# Patient Record
Sex: FEMALE | Race: WHITE | Employment: UNEMPLOYED | URBAN - METROPOLITAN AREA
[De-identification: names, ages, dates, MRNs, and addresses within clinical notes are randomized per-mention and may not be internally consistent; named-entity substitution may affect disease eponyms.]

---

## 2017-01-27 ENCOUNTER — OFFICE VISIT (OUTPATIENT)
Dept: PAIN MANAGEMENT | Age: 42
End: 2017-01-27

## 2017-01-27 VITALS — DIASTOLIC BLOOD PRESSURE: 82 MMHG | HEART RATE: 110 BPM | SYSTOLIC BLOOD PRESSURE: 120 MMHG | HEIGHT: 61 IN

## 2017-01-27 DIAGNOSIS — G89.4 CHRONIC PAIN SYNDROME: Primary | ICD-10-CM

## 2017-01-27 DIAGNOSIS — M79.7 FIBROMYALGIA: ICD-10-CM

## 2017-01-27 DIAGNOSIS — N30.10 INTERSTITIAL CYSTITIS: ICD-10-CM

## 2017-01-27 DIAGNOSIS — M25.50 PAIN IN JOINT, MULTIPLE SITES: ICD-10-CM

## 2017-01-27 DIAGNOSIS — G47.01 INSOMNIA SECONDARY TO CHRONIC PAIN: ICD-10-CM

## 2017-01-27 DIAGNOSIS — F51.04 CHRONIC INSOMNIA: ICD-10-CM

## 2017-01-27 DIAGNOSIS — Z79.899 ENCOUNTER FOR LONG-TERM (CURRENT) USE OF HIGH-RISK MEDICATION: ICD-10-CM

## 2017-01-27 DIAGNOSIS — M79.2 NEUROPATHIC PAIN: ICD-10-CM

## 2017-01-27 DIAGNOSIS — G89.29 INSOMNIA SECONDARY TO CHRONIC PAIN: ICD-10-CM

## 2017-01-27 RX ORDER — GABAPENTIN 600 MG/1
600 TABLET ORAL 3 TIMES DAILY
Qty: 90 TAB | Refills: 2 | Status: SHIPPED | OUTPATIENT
Start: 2017-01-27 | End: 2017-10-12 | Stop reason: SDUPTHER

## 2017-01-27 RX ORDER — HYDROMORPHONE HYDROCHLORIDE 8 MG/1
8 TABLET ORAL
Qty: 120 TAB | Refills: 0 | Status: SHIPPED | OUTPATIENT
Start: 2017-01-27 | End: 2017-08-22 | Stop reason: SDUPTHER

## 2017-01-27 RX ORDER — DULOXETIN HYDROCHLORIDE 60 MG/1
60 CAPSULE, DELAYED RELEASE ORAL DAILY
Qty: 30 CAP | Refills: 5 | Status: SHIPPED | OUTPATIENT
Start: 2017-01-27 | End: 2017-06-23 | Stop reason: SDUPTHER

## 2017-01-27 RX ORDER — FENTANYL 75 UG/H
PATCH TRANSDERMAL
Qty: 10 PATCH | Refills: 0 | Status: SHIPPED | OUTPATIENT
Start: 2017-01-27 | End: 2017-06-17

## 2017-01-27 RX ORDER — FENTANYL 75 UG/H
PATCH TRANSDERMAL
Qty: 10 PATCH | Refills: 0 | Status: SHIPPED | OUTPATIENT
Start: 2017-01-27 | End: 2017-04-25 | Stop reason: SDUPTHER

## 2017-01-27 RX ORDER — HYDROMORPHONE HYDROCHLORIDE 8 MG/1
8 TABLET ORAL
Qty: 120 TAB | Refills: 0 | Status: SHIPPED | OUTPATIENT
Start: 2017-01-27 | End: 2017-02-26

## 2017-01-27 RX ORDER — BACLOFEN 10 MG/1
TABLET ORAL
Qty: 90 TAB | Refills: 3 | Status: SHIPPED | OUTPATIENT
Start: 2017-01-27 | End: 2017-04-25 | Stop reason: SDUPTHER

## 2017-01-27 RX ORDER — GABAPENTIN 300 MG/1
600 CAPSULE ORAL 2 TIMES DAILY
Qty: 120 CAP | Refills: 1 | Status: CANCELLED | OUTPATIENT
Start: 2017-01-27

## 2017-01-27 NOTE — MR AVS SNAPSHOT
Visit Information Date & Time Provider Department Dept. Phone Encounter #  
 1/27/2017 10:20 AM Samy Bernal, [address] [facility] [id number] Follow-up Instructions Return in about 3 months (around 4/27/2017). Upcoming Health Maintenance Date Due Pneumococcal 19-64 Medium Risk (1 of 1 - PPSV23) 2/16/1996 DTaP/Tdap/Td series (1 - Tdap) 2/16/1998 PAP AKA CERVICAL CYTOLOGY 2/16/1998 INFLUENZA AGE 9 TO ADULT 8/1/2016 Allergies as of 1/27/2017  Review Complete On: 1/27/2017 By: SAMMI Beard Severity Noted Reaction Type Reactions Augmentin [Amoxicillin-pot Clavulanate] High 05/19/2013    Anaphylaxis Amoxicillin  04/04/2013   Topical Hives, Itching Itching of throat Ciprofloxacin  04/04/2013   Topical Other (comments) Redness of arm above IV site Darvocet A500 [Propoxyphene N-acetaminophen]  04/04/2013   Topical Hives Macrobid [Nitrofurantoin Monohyd/m-cryst]  07/23/2013   Side Effect Hives Nubain [Nalbuphine]  04/04/2013   Topical Hives, Other (comments) Muscle spasms Phenergan [Promethazine]  11/09/2016    Other (comments) Makes me feel like Im on fire from the inside out and causes involuntary muscle spasms. Toradol [Ketorolac Tromethamine]  01/11/2016    Itching Vibramycin [Doxycycline Calcium]  04/04/2013   Topical Hives, Itching Itching of the throat Current Immunizations  Never Reviewed No immunizations on file. Not reviewed this visit You Were Diagnosed With   
  
 Codes Comments Chronic pain syndrome    -  Primary ICD-10-CM: G89.4 ICD-9-CM: 338.4 Encounter for long-term (current) use of high-risk medication     ICD-10-CM: Z79.899 ICD-9-CM: V58.69 Interstitial cystitis     ICD-10-CM: N30.10 ICD-9-CM: 595.1  Insomnia secondary to chronic pain     ICD-10-CM: G89.29, G47.01 
ICD-9-CM: 338.29, 327.01   
 Fibromyalgia     ICD-10-CM: M79.7 ICD-9-CM: 729.1 Pain in joint, multiple sites     ICD-10-CM: M25.50 ICD-9-CM: 719.49 Neuropathic pain     ICD-10-CM: M79.2 ICD-9-CM: 729.2 Chronic insomnia     ICD-10-CM: F51.04 
ICD-9-CM: 780.52 Vitals BP Pulse Height(growth percentile) OB Status Smoking Status 120/82 (!) 110 5' 1\" (1.549 m) Hysterectomy Current Every Day Smoker Preferred Pharmacy Pharmacy Name Phone [address] [facility]. [address]  [phone] Your Updated Medication List  
  
   
This list is accurate as of: 1/27/17 11:24 AM.  Always use your most recent med list.  
  
  
  
  
 * baclofen 10 mg tablet Commonly known as:  LIORESAL Take 1/2 to 1 tab po up to three times per day for muscle spasms * baclofen 10 mg tablet Commonly known as:  LIORESAL Take 1/2 to 1 tab po up to three times per day for muscle spasms DULoxetine 60 mg capsule Commonly known as:  CYMBALTA Take 1 Cap by mouth daily for 30 days. Indications: FIBROMYALGIA  
  
 EPINEPHrine 0.3 mg/0.3 mL (1:1,000) injection Commonly known as:  AUVI-Q  
0.3 mL by IntraMUSCular route once as needed for Other (severe allergic reaction ) for 1 dose. * fentaNYL 75 mcg/hr Commonly known as:  [place] Gustavo Apply 1 patch to clean, dry skin q 72 hrs for chronic pain. Remove, fold over, & discard old patch. Do not use heat sources directly over the patch. (due to fill 02/03/17)  Indications: CHRONIC PAIN WITH OPIOID TOLERANCE, SEVERE PAIN WITH OPIOID TOLERANCE  
  
 * fentaNYL 75 mcg/hr Commonly known as:  [place] Gustavo Apply 1 patch to clean, dry skin q 72 hrs for chronic pain. Remove, fold over, & discard old patch. Do not use heat sources directly over the patch. (due to fill 03/04/17)  Indications: CHRONIC PAIN WITH OPIOID TOLERANCE, SEVERE PAIN WITH OPIOID TOLERANCE  
  
 * fentaNYL 75 mcg/hr Commonly known as:  [place] Gustavo  
 Apply 1 patch to clean, dry skin q 72 hrs for chronic pain. Remove, fold over, & discard old patch. Do not use heat sources directly over the patch. Due to fill 4/1/17  Indications: CHRONIC PAIN WITH OPIOID TOLERANCE, SEVERE PAIN WITH OPIOID TOLERANCE  
  
 * gabapentin 300 mg capsule Commonly known as:  NEURONTIN Take 2 Caps by mouth two (2) times a day. Take one tab po qhs for two weeks then increase to 600 mg at bedtime as tolerated * gabapentin 600 mg tablet Commonly known as:  NEURONTIN Take 1 Tab by mouth three (3) times daily. For neuropathic pain  
  
 * HYDROmorphone 8 mg tablet Commonly known as:  DILAUDID Take 1 Tab by mouth four (4) times daily as needed for Pain for up to 30 days. For breakthrough pain. Due to fill 02/03/17  Indications: PAIN  
  
 * HYDROmorphone 8 mg tablet Commonly known as:  DILAUDID Take 1 Tab by mouth four (4) times daily as needed for Pain for up to 30 days. For breakthrough pain. Due to fill 03/04/17  Indications: PAIN  
  
 * HYDROmorphone 8 mg tablet Commonly known as:  DILAUDID Take 1 Tab by mouth four (4) times daily as needed for Pain for up to 30 days. For breakthrough pain. Due to fill 4/1/17  Indications: PAIN  
  
 LORazepam 0.5 mg tablet Commonly known as:  ATIVAN Take  by mouth. ondansetron 4 mg disintegrating tablet Commonly known as:  ZOFRAN ODT Take 1 Tab by mouth every eight (8) hours as needed for Nausea. * Notice: This list has 10 medication(s) that are the same as other medications prescribed for you. Read the directions carefully, and ask your doctor or other care provider to review them with you. Prescriptions Printed Refills  
 fentaNYL (DURAGESIC) 75 mcg/hr 0 Sig: Apply 1 patch to clean, dry skin q 72 hrs for chronic pain. Remove, fold over, & discard old patch. Do not use heat sources directly over the patch. (due to fill 02/03/17)  Indications: CHRONIC PAIN WITH OPIOID TOLERANCE, SEVERE PAIN WITH OPIOID TOLERANCE Class: Print HYDROmorphone (DILAUDID) 8 mg tablet 0 Sig: Take 1 Tab by mouth four (4) times daily as needed for Pain for up to 30 days. For breakthrough pain. Due to fill 02/03/17  Indications: PAIN Class: Print Route: Oral  
 fentaNYL (DURAGESIC) 75 mcg/hr 0 Sig: Apply 1 patch to clean, dry skin q 72 hrs for chronic pain. Remove, fold over, & discard old patch. Do not use heat sources directly over the patch. (due to fill 03/04/17)  Indications: CHRONIC PAIN WITH OPIOID TOLERANCE, SEVERE PAIN WITH OPIOID TOLERANCE Class: Print HYDROmorphone (DILAUDID) 8 mg tablet 0 Sig: Take 1 Tab by mouth four (4) times daily as needed for Pain for up to 30 days. For breakthrough pain. Due to fill 03/04/17  Indications: PAIN Class: Print Route: Oral  
 HYDROmorphone (DILAUDID) 8 mg tablet 0 Sig: Take 1 Tab by mouth four (4) times daily as needed for Pain for up to 30 days. For breakthrough pain. Due to fill 4/1/17  Indications: PAIN Class: Print Route: Oral  
 fentaNYL (DURAGESIC) 75 mcg/hr 0 Sig: Apply 1 patch to clean, dry skin q 72 hrs for chronic pain. Remove, fold over, & discard old patch. Do not use heat sources directly over the patch. Due to fill 4/1/17  Indications: CHRONIC PAIN WITH OPIOID TOLERANCE, SEVERE PAIN WITH OPIOID TOLERANCE Class: Print Prescriptions Sent to Pharmacy Refills  
 baclofen (LIORESAL) 10 mg tablet 3 Sig: Take 1/2 to 1 tab po up to three times per day for muscle spasms Class: Normal  
 [facility] [address] Ph #: [phone] DULoxetine (CYMBALTA) 60 mg capsule 5 Sig: Take 1 Cap by mouth daily for 30 days. Indications: FIBROMYALGIA Class: Normal  
 [facility] [address] [address] Ph #: [phone]  Route: Oral  
 gabapentin (NEURONTIN) 600 mg tablet 2  
 Sig: Take 1 Tab by mouth three (3) times daily. For neuropathic pain  
 Class: Normal  
 Pharmacy: [address] [place], [place] - [address] [facility] Ph #: [phone] Route: Oral  
  
Follow-up Instructions Return in about 3 months (around 4/27/2017). Patient Instructions 1. Continue medications as prescribed 2. Increase neurontin to 600 mg three times per day 3. Follow up in three months with dr. Yohan Russell 4. Keep pcp apt./consider GI/sleep study Introducing [facility]! Dear Baljit Oglesby: Thank you for requesting a I AM AT account. Our records indicate that you already have an active I AM AT account. You can access your account anytime at [email or web address] Did you know that you can access your hospital and ER discharge instructions at any time in I AM AT? You can also review all of your test results from your hospital stay or ER visit. Additional Information If you have questions, please visit the Frequently Asked Questions section of the I AM AT website at [email or web address] Remember, I AM AT is NOT to be used for urgent needs. For medical emergencies, dial 911. Now available from your iPhone and Android! Please provide this summary of care documentation to your next provider. Your primary care clinician is listed as [facility]. If you have any questions after today's visit, please call [phone].

## 2017-01-27 NOTE — PATIENT INSTRUCTIONS
1.  Continue medications as prescribed  2. Increase neurontin to 600 mg three times per day  3. Follow up in three months with dr. Kamari Hayes  4.   Keep pcp apt./consider GI/sleep study

## 2017-01-27 NOTE — PROGRESS NOTES
HISTORY OF PRESENT ILLNESS  Danuta Stuart is a 46 y.o. female. HPI  The patient presents today for follow up of chronic severe pain which is widespread and multifactorial and includes fibromyalgia as well as generalized osteoarthritis and interstitial cystitis. Complex patient. She has a pcp but no appointment scheduled. She does reports episodes of twitching at night when she goes to sleep (advised can be result of opioid). The patient denies any other significant changes since last f/u. She tolerates medications without side effects. Danuta Stuart reports no change in sleep or constipation. She is not taking a sleep aid (otc prn). No sleep study yet, waiting for insurance. She takes a stool softener for constipation. She has constipation and loose stools. Recommend GI. The patient reports 40-50% relief with current medications. She has a 15year old son. She reports her headaches improved. She describes the bladder pain as constant,  \"like my insides are caving in\", stabbing, and burning. Sitting and physical activity aggravate her pain. Laying down, medication and heat alleviate her pain. She is spending most of her time in bed. She continues to have generalized body pain that is constant and achy. She is not under any psychiatric therapy. She denies suicidal/homicidal ideation. The patient reports functional improvement and QOL with pain medication. The neurontin is helping. It does not last 12 hours. No side effects. No concern for misuse abuse or diversion. UDS 10/13/16 reviewed and appears consistent    Review of Systems   Constitutional: Positive for malaise/fatigue. Gastrointestinal: Positive for abdominal pain (cramping) and constipation. Genitourinary: Positive for dysuria. H/o IC   Musculoskeletal: Positive for back pain, joint pain, myalgias and neck pain. Neurological: Positive for weakness (generalized). Psychiatric/Behavioral: The patient has insomnia. All other systems reviewed and are negative. Physical Exam   Constitutional: She is oriented to person, place, and time. She appears well-developed and well-nourished. No distress. HENT:   Head: Normocephalic. Eyes: EOM are normal. Pupils are equal, round, and reactive to light. Neck:   Tender throughout/painful ROM/tight musculature   Pulmonary/Chest: Effort normal.   Abdominal: There is tenderness (exquisite) in the suprapubic area. Musculoskeletal: She exhibits tenderness (throughout back/tight musculature). She exhibits no edema. Right hip: She exhibits tenderness (over TB). Left hip: She exhibits tenderness (over TB). Right knee: Tenderness (medial aspect) found. Left knee: Tenderness (medial aspect) found. Cervical back: She exhibits tenderness, pain and spasm. She exhibits normal range of motion. Thoracic back: She exhibits tenderness (to light and deep palpation) and pain. She exhibits normal range of motion. Lumbar back: She exhibits tenderness (to light and deep palpation/generalized/+SIJ tenderness bilaterally) and pain. She exhibits normal range of motion (painful) and no spasm. Back:    Neurological: She is alert and oriented to person, place, and time. No cranial nerve deficit. Gait (antalgic) abnormal.   Psychiatric: Her behavior is normal. Judgment and thought content normal. Her mood appears anxious. She exhibits a depressed mood. Nursing note and vitals reviewed. ASSESSMENT and PLAN  Encounter Diagnoses   Name Primary?     Chronic pain syndrome Yes    Encounter for long-term (current) use of high-risk medication     Interstitial cystitis     Insomnia secondary to chronic pain     Fibromyalgia     Pain in joint, multiple sites     Neuropathic pain     Chronic insomnia      Orders Placed This Encounter    baclofen (LIORESAL) 10 mg tablet    DULoxetine (CYMBALTA) 60 mg capsule    fentaNYL (DURAGESIC) 75 mcg/hr    HYDROmorphone (DILAUDID) 8 mg tablet    fentaNYL (DURAGESIC) 75 mcg/hr    HYDROmorphone (DILAUDID) 8 mg tablet    gabapentin (NEURONTIN) 600 mg tablet    HYDROmorphone (DILAUDID) 8 mg tablet    fentaNYL (DURAGESIC) 75 mcg/hr     Patient Instructions   1. Continue medications as prescribed  2. Increase neurontin to 600 mg three times per day  3. Follow up in three months with dr. Jm Arias  4. Keep pcp apt./consider GI/sleep study    40 minutes spent in visit face to face with the patient.     >50% of time spent counseling and coordinating care

## 2017-01-27 NOTE — PROGRESS NOTES
Nursing Notes    Patient presents to the office today in follow-up. Patient rates her pain at 6/10 on the numerical pain scale. Reviewed medications with counts as follows:    Rx Date filled Qty Dispensed Pill Count Last Dose Short   Fentanyl 75 mcg/hr  01/05/17 10 2, + one on Current patch on right hip no   Dilaudid 8 mg 01/05/17 120 36 today no                            POC UDS was not performed in office today. Any new labs or imaging since last appointment? NO    Have you been to an emergency room (ER) or urgent care clinic since your last visit? NO            Have you been hospitalized since your last visit? NO     If yes, where, when, and reason for visit? Have you seen or consulted any other health care providers outside of the [address] [place] [address]  since your last visit? NO     If yes, where, when, and reason for visit? HM deferred to pcp.

## 2017-03-06 NOTE — TELEPHONE ENCOUNTER
Received call from pt relaying that Narcan will need to be prescribed at her next fill due to new regulations. Have sent script for [facility] to [facility].

## 2017-04-25 ENCOUNTER — OFFICE VISIT (OUTPATIENT)
Dept: PAIN MANAGEMENT | Age: 42
End: 2017-04-25

## 2017-04-25 VITALS — BODY MASS INDEX: 24.56 KG/M2 | WEIGHT: 130 LBS

## 2017-04-25 DIAGNOSIS — N30.10 INTERSTITIAL CYSTITIS: Primary | ICD-10-CM

## 2017-04-25 DIAGNOSIS — G89.29 INSOMNIA SECONDARY TO CHRONIC PAIN: ICD-10-CM

## 2017-04-25 DIAGNOSIS — G47.01 INSOMNIA SECONDARY TO CHRONIC PAIN: ICD-10-CM

## 2017-04-25 DIAGNOSIS — M79.7 FIBROMYALGIA: ICD-10-CM

## 2017-04-25 DIAGNOSIS — M25.50 PAIN IN JOINT, MULTIPLE SITES: ICD-10-CM

## 2017-04-25 DIAGNOSIS — Z79.899 ENCOUNTER FOR LONG-TERM (CURRENT) USE OF HIGH-RISK MEDICATION: ICD-10-CM

## 2017-04-25 DIAGNOSIS — G89.4 CHRONIC PAIN SYNDROME: ICD-10-CM

## 2017-04-25 DIAGNOSIS — M79.2 NEUROPATHIC PAIN: ICD-10-CM

## 2017-04-25 LAB
ALCOHOL UR POC: NORMAL
AMPHETAMINES UR POC: NEGATIVE
BARBITURATES UR POC: NEGATIVE
BENZODIAZEPINES UR POC: NEGATIVE
BUPRENORPHINE UR POC: NORMAL
CANNABINOIDS UR POC: NEGATIVE
CARISOPRODOL UR POC: NORMAL
COCAINE UR POC: NEGATIVE
FENTANYL UR POC: NORMAL
MDMA/ECSTASY UR POC: NEGATIVE
METHADONE UR POC: NEGATIVE
METHAMPHETAMINE UR POC: NEGATIVE
METHYLPHENIDATE UR POC: NEGATIVE
OPIATES UR POC: NORMAL
OXYCODONE UR POC: NEGATIVE
PHENCYCLIDINE UR POC: NEGATIVE
PROPOXYPHENE UR POC: NORMAL
TRAMADOL UR POC: NORMAL
TRICYCLICS UR POC: NEGATIVE

## 2017-04-25 RX ORDER — MORPHINE SULFATE 15 MG/1
15 TABLET ORAL
Qty: 120 TAB | Refills: 0 | Status: SHIPPED | OUTPATIENT
Start: 2017-05-23 | End: 2017-06-23 | Stop reason: SDUPTHER

## 2017-04-25 RX ORDER — MORPHINE SULFATE 15 MG/1
15 TABLET ORAL
Qty: 120 TAB | Refills: 0 | Status: SHIPPED | OUTPATIENT
Start: 2017-04-25 | End: 2017-06-23 | Stop reason: SDUPTHER

## 2017-04-25 RX ORDER — METHADONE HYDROCHLORIDE 10 MG/1
10 TABLET ORAL 3 TIMES DAILY
Qty: 90 TAB | Refills: 0 | Status: SHIPPED | OUTPATIENT
Start: 2017-05-23 | End: 2017-06-23 | Stop reason: SDUPTHER

## 2017-04-25 RX ORDER — METHADONE HYDROCHLORIDE 10 MG/1
10 TABLET ORAL 3 TIMES DAILY
Qty: 90 TAB | Refills: 0 | Status: SHIPPED | OUTPATIENT
Start: 2017-04-25 | End: 2017-06-23 | Stop reason: SDUPTHER

## 2017-04-25 RX ORDER — GABAPENTIN 800 MG/1
800 TABLET ORAL 3 TIMES DAILY
Qty: 90 TAB | Refills: 5 | Status: SHIPPED | OUTPATIENT
Start: 2017-04-25 | End: 2017-05-25

## 2017-04-25 NOTE — PROGRESS NOTES
HISTORY OF PRESENT ILLNESS  Nigel Snow is a 38 y.o. female. HPI Returns for followup of chronic, severe pain which is widespread and multifactorial and includes fibromyalgia as well as generalized osteoarthritis and interstitial cystitis. Since last seen, she has suffered a significant decline in her functionality and pain control. Her medication history was reviewed. She has been on fentanyl and hydromorphone for a considerable period of time. Alternatives were reviewed and it was elected to discontinue both fentanyl and hydromorphone and initiate methadone, 10 mg, 3 times daily for long-acting pain control and MSIR, 15 mg, 4 times daily as needed for breakthrough pain. Gabapentin will also be increased to 800 mg 3 times daily for better neuropathic pain control. Pain level today 7 out of 10, outcome 16/28,(The lower the upper number, the better the outcome)  Physical activity and mobility, mood, and sleep are all poor. No reported side effects. A current review of the  does not identify any inconsistency. UDS obtained and reviewed; formal confirmation from laboratory is pending. Review of Systems   Constitutional: Positive for malaise/fatigue. Gastrointestinal: Positive for abdominal pain (cramping) and constipation. Genitourinary: Positive for dysuria. H/o IC   Musculoskeletal: Positive for back pain, joint pain, myalgias and neck pain. Neurological: Positive for weakness (generalized). Psychiatric/Behavioral: The patient has insomnia. All other systems reviewed and are negative. Physical Exam   Constitutional: She is oriented to person, place, and time. She appears well-developed and well-nourished. No distress. HENT:   Head: Normocephalic. Eyes: EOM are normal. Pupils are equal, round, and reactive to light. Neck: No thyromegaly present.    Tender throughout/painful ROM/tight musculature   Pulmonary/Chest: Effort normal.   Abdominal: There is tenderness (exquisite) in the suprapubic area. Musculoskeletal: She exhibits tenderness (throughout back/tight musculature). She exhibits no edema. Right hip: She exhibits tenderness (over TB). Left hip: She exhibits tenderness (over TB). Right knee: Tenderness (medial aspect) found. Left knee: Tenderness (medial aspect) found. Cervical back: She exhibits pain. She exhibits normal range of motion and no spasm. Tenderness: tight musculature/to light and deep palpation. Thoracic back: She exhibits tenderness (to light and deep palpation) and pain. She exhibits normal range of motion. Lumbar back: She exhibits tenderness (to light and deep palpation/generalized/+SIJ tenderness bilaterally) and pain. She exhibits normal range of motion (painful) and no spasm. Neurological: She is alert and oriented to person, place, and time. No cranial nerve deficit. Gait (antalgic) abnormal.   Psychiatric: Her behavior is normal. Judgment and thought content normal. Her mood appears anxious. She exhibits a depressed mood. Nursing note and vitals reviewed. ASSESSMENT and PLAN  Encounter Diagnoses   Name Primary?  Interstitial cystitis Yes    Encounter for long-term (current) use of high-risk medication     Neuropathic pain     Insomnia secondary to chronic pain     Chronic pain syndrome     Fibromyalgia     Pain in joint, multiple sites      Treatment plan as noted above. She has already received a prescription for injectable naloxone to use in the event of an overdose. 2 month reassess    No concerns are raised for misuse, abuse, or diversion. 1. Pain medications are prescribed with the objective of pain relief and improved physical and psychosocial function in this patient. 2. Counseled patient on proper use of prescribed medications and reviewed opioid contract.   3. Counseled patient about chronic medical conditions and their relationship to anxiety and depression and recommended mental health support as needed. 4. Reviewed with patient self-help tools, home exercise, and lifestyle changes to assist the patient in self-management of symptoms. 5. Advised patient to have a primary care provider to continue care for health maintenance and general medical conditions and support for referral to specialty care as needed. 6. Reviewed with patient the treatment plan, goals of treatment plan, and limitations of treatment plan, to include the potential for side effects from medications and procedures. If side effects occur, it is the responsibility of the patient to inform the clinic so that a change in the treatment plan can be made in a safe manner. The patient is advised that stopping prescribed medication may cause an increase in symptoms and possible medication withdrawal symptoms. The patient is informed an emergency room evaluation may be necessary if this occurs. DISPOSITION: The patients condition and plan were discussed at length and all questions were answered. The patient agrees with the plan.     Counseling occupied > 50% of visit:  Total time: 40 minutes

## 2017-04-25 NOTE — PATIENT INSTRUCTIONS
You are in possession of medication that is no longer part of your active treatment regimen. We strongly advise that you properly dispose of it as quickly as possible to help reduce the chance that others may accidentally take or intentionally misuse the discontinued medication. Please understand that ongoing use or distribution of a discontinued medication containing a controlled substance is a violation of your signed Controlled Substance Consent & Treatment Agreement and will result in dismissal from our practice. Listed below are some options and special instructions to consider when disposing of discontinued, , unwanted, or unused medications:    · [employer] ([employer]) authorized collectors cansafely and securely collect and dispose of pharmaceuticals containing controlled substances and other medications. Authorized collection sites may be [employer], hospital or clinic pharmacies, and law enforcement locations. For JIHAN-authorized collectors near you, contact the [place] of [address] [place] [address] [facility] at [phone] or visit the following web address: [employer] [place]. [email or web address]?[email or web address]. JIHAN-authorized collectors within the local [address] [place] [address] Jason include:  [address] [address], [address]    [address]  [address]Eduin Heredia, [facility]  [address] [place] [address], [facility] [address], [address]    [address]  [facility] [address], [place] [address], [address]    · Medicine take-back programs are another good way to safely dispose of most types of discontinued medications. The JIHAN periodically hosts National Prescription Drug Take-Back events where collection sites are set up in communities nationwide for safe disposal of prescription drugs. Local law enforcement agencies may also sponsor medication take-back programs. · If unable to reach a medication take-back program or JIHAN-authorized , you can also follow these simple steps to dispose of medications in the household trash:  1. Mix medications (do not crush tablets or capsules) with an unappetizing substance such as dirt, george litter, or used coffee grounds;  2. Place the mixture in a container such as a sealed plastic bag;  3. Throw the container in your household trash;  4. Scratch out all personal information on the prescription label of your empty pill bottle or empty medication packaging to make it unreadable, then dispose of the container. Current health maintenance issues were reviewed and the patient was advised to followup with his/her PCP for completion of these items.

## 2017-04-25 NOTE — MR AVS SNAPSHOT
Visit Information Date & Time Provider Department Dept. Phone Encounter #  
 4/25/2017  9:20 AM Alexis Doan MD [facility] 21  Follow-up Instructions Return in about 2 months (around 6/25/2017). Upcoming Health Maintenance Date Due Pneumococcal 19-64 Medium Risk (1 of 1 - PPSV23) 2/16/1996 DTaP/Tdap/Td series (1 - Tdap) 2/16/1998 PAP AKA CERVICAL CYTOLOGY 2/16/1998 INFLUENZA AGE 9 TO ADULT 8/1/2016 Allergies as of 4/25/2017  Review Complete On: 4/25/2017 By: Alexis Doan MD  
  
 Severity Noted Reaction Type Reactions Augmentin [Amoxicillin-pot Clavulanate] High 05/19/2013    Anaphylaxis Amoxicillin  04/04/2013   Topical Hives, Itching Itching of throat Ciprofloxacin  04/04/2013   Topical Other (comments) Redness of arm above IV site Darvocet A500 [Propoxyphene N-acetaminophen]  04/04/2013   Topical Hives Macrobid [Nitrofurantoin Monohyd/m-cryst]  07/23/2013   Side Effect Hives Nubain [Nalbuphine]  04/04/2013   Topical Hives, Other (comments) Muscle spasms Phenergan [Promethazine]  11/09/2016    Other (comments) Makes me feel like Im on fire from the inside out and causes involuntary muscle spasms. Toradol [Ketorolac Tromethamine]  01/11/2016    Itching Vibramycin [Doxycycline Calcium]  04/04/2013   Topical Hives, Itching Itching of the throat Current Immunizations  Never Reviewed No immunizations on file. Not reviewed this visit You Were Diagnosed With   
  
 Codes Comments Interstitial cystitis    -  Primary ICD-10-CM: N30.10 ICD-9-CM: 595.1 Encounter for long-term (current) use of high-risk medication     ICD-10-CM: Z79.899 ICD-9-CM: V58.69 Neuropathic pain     ICD-10-CM: M79.2 ICD-9-CM: 729.2  Insomnia secondary to chronic pain     ICD-10-CM: G89.29, G47.01 
ICD-9-CM: 338.29, 327.01   
 Chronic pain syndrome     ICD-10-CM: G89.4 ICD-9-CM: 338.4 Fibromyalgia     ICD-10-CM: M79.7 ICD-9-CM: 729.1 Pain in joint, multiple sites     ICD-10-CM: M25.50 ICD-9-CM: 719.49 Vitals Weight(growth percentile) BMI OB Status Smoking Status 130 lb (59 kg) 24.56 kg/m2 Hysterectomy Current Every Day Smoker BMI and BSA Data Body Mass Index Body Surface Area 24.56 kg/m 2 1.59 m 2 Preferred Pharmacy Pharmacy Name Phone [address] [facility]. [address] [place] [address] [phone] Your Updated Medication List  
  
   
This list is accurate as of: 4/25/17 10:19 AM.  Always use your most recent med list.  
  
  
  
  
 baclofen 10 mg tablet Commonly known as:  LIORESAL Take 1/2 to 1 tab po up to three times per day for muscle spasms EPINEPHrine 0.3 mg/0.3 mL (1:1,000) injection Commonly known as:  AUVI-Q  
0.3 mL by IntraMUSCular route once as needed for Other (severe allergic reaction ) for 1 dose. fentaNYL 75 mcg/hr Commonly known as:  [place] Apply 1 patch to clean, dry skin q 72 hrs for chronic pain. Remove, fold over, & discard old patch. Do not use heat sources directly over the patch. (due to fill 02/03/17)  Indications: CHRONIC PAIN WITH OPIOID TOLERANCE, SEVERE PAIN WITH OPIOID TOLERANCE  
  
 * gabapentin 300 mg capsule Commonly known as:  NEURONTIN Take 2 Caps by mouth two (2) times a day. Take one tab po qhs for two weeks then increase to 600 mg at bedtime as tolerated * gabapentin 600 mg tablet Commonly known as:  NEURONTIN Take 1 Tab by mouth three (3) times daily. For neuropathic pain * gabapentin 800 mg tablet Commonly known as:  NEURONTIN Take 1 Tab by mouth three (3) times daily for 30 days. Indications: NEUROPATHIC PAIN  
  
 LORazepam 0.5 mg tablet Commonly known as:  ATIVAN Take  by mouth. * methadone 10 mg tablet Commonly known as:  DOLOPHINE  
 Take 1 Tab by mouth three (3) times daily for 30 days. Max Daily Amount: 30 mg. Indications: Chronic Pain, Severe Pain * methadone 10 mg tablet Commonly known as:  DOLOPHINE Take 1 Tab by mouth three (3) times daily for 30 days. Max Daily Amount: 30 mg. Indications: Chronic Pain, Severe Pain Start taking on:  5/23/2017 * morphine IR 15 mg tablet Commonly known as:  MS IR Take 1 Tab by mouth four (4) times daily as needed for Pain for up to 30 days. Max Daily Amount: 60 mg. Indications: Pain, Severe Pain * morphine IR 15 mg tablet Commonly known as:  MS IR Take 1 Tab by mouth four (4) times daily as needed for Pain for up to 30 days. Max Daily Amount: 60 mg. Indications: Pain, Severe Pain Start taking on:  5/23/2017 NARCAN IJ  
by Injection route. ondansetron 4 mg disintegrating tablet Commonly known as:  ZOFRAN ODT Take 1 Tab by mouth every eight (8) hours as needed for Nausea. * Notice: This list has 7 medication(s) that are the same as other medications prescribed for you. Read the directions carefully, and ask your doctor or other care provider to review them with you. Prescriptions Printed Refills  
 methadone (DOLOPHINE) 10 mg tablet 0 Starting on: 5/23/2017 Sig: Take 1 Tab by mouth three (3) times daily for 30 days. Max Daily Amount: 30 mg. Indications: Chronic Pain, Severe Pain Class: Print Route: Oral  
 methadone (DOLOPHINE) 10 mg tablet 0 Sig: Take 1 Tab by mouth three (3) times daily for 30 days. Max Daily Amount: 30 mg. Indications: Chronic Pain, Severe Pain Class: Print Route: Oral  
 morphine IR (MS IR) 15 mg tablet 0 Starting on: 5/23/2017 Sig: Take 1 Tab by mouth four (4) times daily as needed for Pain for up to 30 days. Max Daily Amount: 60 mg. Indications: Pain, Severe Pain Class: Print  Route: Oral  
 morphine IR (MS IR) 15 mg tablet 0  
 Sig: Take 1 Tab by mouth four (4) times daily as needed for Pain for up to 30 days. Max Daily Amount: 60 mg. Indications: Pain, Severe Pain Class: Print Route: Oral  
  
Prescriptions Sent to Pharmacy Refills  
 gabapentin (NEURONTIN) 800 mg tablet 5 Sig: Take 1 Tab by mouth three (3) times daily for 30 days. Indications: NEUROPATHIC PAIN Class: Normal  
 Pharmacy: [facility] [address]  Ph #: [phone] Route: Oral  
  
We Performed the Following AMB POC DRUG SCREEN () [ [facility]] DRUG SCREEN [[id number] Custom] Follow-up Instructions Return in about 2 months (around 2017). Patient Instructions You are in possession of medication that is no longer part of your active treatment regimen. We strongly advise that you properly dispose of it as quickly as possible to help reduce the chance that others may accidentally take or intentionally misuse the discontinued medication. Please understand that ongoing use or distribution of a discontinued medication containing a controlled substance is a violation of your signed Controlled Substance Consent & Treatment Agreement and will result in dismissal from our practice. Listed below are some options and special instructions to consider when disposing of discontinued, , unwanted, or unused medications: · [employer] ([employer]) authorized collectors cansafely and securely collect and dispose of pharmaceuticals containing controlled substances and other medications. Authorized collection sites may be [employer], hospital or clinic pharmacies, and law enforcement locations. For JIHAN-authorized collectors near you, contact the [place] of [address] [place] [address] at [phone] or visit the following web address: [employer]win. [employer]gov/pubdispsearch/spring/main?execution=e1s1. JIHAN-authorized collectors within the local [address] [place] [address] include: 
[address] [address] 
[place], [address] [facility] 
KIKI/Eduin Tolliver Giovanna, [facility] [address] [facility] [address] [place], [address] 
[address], [facility] 100 [facility], [address] · Medicine take-back programs are another good way to safely dispose of most types of discontinued medications. The JIHAN periodically hosts National Prescription Drug Take-Back events where collection sites are set up in communities nationwide for safe disposal of prescription drugs. Local law enforcement agencies may also sponsor medication take-back programs. · If unable to reach a medication take-back program or JIHAN-authorized , you can also follow these simple steps to dispose of medications in the household trash: 
1. Mix medications (do not crush tablets or capsules) with an unappetizing substance such as dirt, george litter, or used coffee grounds; 2. Place the mixture in a container such as a sealed plastic bag; 
3. Throw the container in your household trash; 
4. Scratch out all personal information on the prescription label of your empty pill bottle or empty medication packaging to make it unreadable, then dispose of the container. Current health maintenance issues were reviewed and the patient was advised to followup with his/her PCP for completion of these items. Introducing [facility] HE[facility]! Dear Andrew Razo: Thank you for requesting a [employer] account. Our records indicate that you already have an active [employer] account. You can access your account anytime at [email or web address] Did you know that you can access your hospital and ER discharge instructions at any time in [employer]?   You can also review all of your test results from your hospital stay or ER visit. Additional Information If you have questions, please visit the Frequently Asked Questions section of the [employer] website at [email or web address] Remember, [employer] is NOT to be used for urgent needs. For medical emergencies, dial 911. Now available from your iPhone and Android! Please provide this summary of care documentation to your next provider. Your primary care clinician is listed as [address] [place] [address]. If you have any questions after today's visit, please call [phone].

## 2017-04-25 NOTE — PROGRESS NOTES
Nursing Notes    Patient presents to the office today in follow-up. Patient rates her pain at 7/10 on the numerical pain scale. Reviewed medications with counts as follows:    Rx Date filled Qty Dispensed Pill Count Last Dose Short   Fentanyl 75 4/1/17 10 2+1 on Placed 4/23/17 no   Dilaudid 8 4/1/17 120 30 4/25/17 no         Comments:     POC UDS was performed in office today    Any new labs or imaging since last appointment? NO    Have you been to an emergency room (ER) or urgent care clinic since your last visit? NO            Have you been hospitalized since your last visit? NO     If yes, where, when, and reason for visit? Have you seen or consulted any other health care providers outside of the [address] [place] [address]  since your last visit? NO     If yes, where, when, and reason for visit? Ms. Vicky Park has a reminder for a \"due or due soon\" health maintenance. I have asked that she contact her primary care provider for follow-up on this health maintenance.

## 2017-04-25 NOTE — PROGRESS NOTES
Ms. Mike Dawson was advised to discontinue fentanyl and dilaudid. She was provided education on proper medication disposal options as indicated by the FDA/JIHAN. She was also advised that failing to properly dispose of medications that are no longer part of her regimen would be considered non-compliance with the treatment plan.

## 2017-06-17 ENCOUNTER — APPOINTMENT (OUTPATIENT)
Dept: GENERAL RADIOLOGY | Age: 42
End: 2017-06-17
Attending: INTERNAL MEDICINE
Payer: MEDICAID

## 2017-06-17 ENCOUNTER — HOSPITAL ENCOUNTER (EMERGENCY)
Age: 42
Discharge: HOME | End: 2017-06-17
Attending: INTERNAL MEDICINE
Payer: MEDICAID

## 2017-06-17 VITALS
TEMPERATURE: 99.4 F | DIASTOLIC BLOOD PRESSURE: 79 MMHG | HEIGHT: 61 IN | SYSTOLIC BLOOD PRESSURE: 108 MMHG | OXYGEN SATURATION: 98 % | BODY MASS INDEX: 25.49 KG/M2 | WEIGHT: 135 LBS | RESPIRATION RATE: 16 BRPM | HEART RATE: 103 BPM

## 2017-06-17 DIAGNOSIS — N39.0 URINARY TRACT INFECTION WITHOUT HEMATURIA, SITE UNSPECIFIED: Primary | ICD-10-CM

## 2017-06-17 DIAGNOSIS — R11.2 NON-INTRACTABLE VOMITING WITH NAUSEA, UNSPECIFIED VOMITING TYPE: ICD-10-CM

## 2017-06-17 LAB
AMPHET UR QL SCN: NEGATIVE
ANION GAP BLD CALC-SCNC: 16 MMOL/L (ref 3–18)
APPEARANCE UR: CLEAR
BACTERIA URNS QL MICRO: ABNORMAL /HPF
BARBITURATES UR QL SCN: NEGATIVE
BASOPHILS # BLD AUTO: 0.1 K/UL (ref 0–0.06)
BASOPHILS # BLD: 1 % (ref 0–2)
BENZODIAZ UR QL: NEGATIVE
BILIRUB UR QL: NEGATIVE
BUN SERPL-MCNC: 15 MG/DL (ref 7–18)
BUN/CREAT SERPL: 17 (ref 12–20)
CALCIUM SERPL-MCNC: 9.8 MG/DL (ref 8.5–10.1)
CANNABINOIDS UR QL SCN: NEGATIVE
CHLORIDE SERPL-SCNC: 100 MMOL/L (ref 100–108)
CO2 SERPL-SCNC: 24 MMOL/L (ref 21–32)
COCAINE UR QL SCN: NEGATIVE
COLOR UR: YELLOW
CREAT SERPL-MCNC: 0.88 MG/DL (ref 0.6–1.3)
DIFFERENTIAL METHOD BLD: ABNORMAL
EOSINOPHIL # BLD: 0 K/UL (ref 0–0.4)
EOSINOPHIL NFR BLD: 0 % (ref 0–5)
EPITH CASTS URNS QL MICRO: ABNORMAL /LPF (ref 0–5)
ERYTHROCYTE [DISTWIDTH] IN BLOOD BY AUTOMATED COUNT: 14.3 % (ref 11.6–14.5)
GLUCOSE SERPL-MCNC: 98 MG/DL (ref 74–99)
GLUCOSE UR STRIP.AUTO-MCNC: NEGATIVE MG/DL
HCG UR QL: NEGATIVE
HCT VFR BLD AUTO: 44.8 % (ref 35–45)
HDSCOM,HDSCOM: ABNORMAL
HGB BLD-MCNC: 14.9 G/DL (ref 12–16)
HGB UR QL STRIP: NEGATIVE
KETONES UR QL STRIP.AUTO: 15 MG/DL
LEUKOCYTE ESTERASE UR QL STRIP.AUTO: ABNORMAL
LIPASE SERPL-CCNC: 107 U/L (ref 73–393)
LYMPHOCYTES # BLD AUTO: 25 % (ref 21–52)
LYMPHOCYTES # BLD: 2.9 K/UL (ref 0.9–3.6)
MCH RBC QN AUTO: 28.6 PG (ref 24–34)
MCHC RBC AUTO-ENTMCNC: 33.3 G/DL (ref 31–37)
MCV RBC AUTO: 86 FL (ref 74–97)
METHADONE UR QL: POSITIVE
MONOCYTES # BLD: 0.5 K/UL (ref 0.05–1.2)
MONOCYTES NFR BLD AUTO: 5 % (ref 3–10)
MUCOUS THREADS URNS QL MICRO: ABNORMAL /LPF
NEUTS SEG # BLD: 8 K/UL (ref 1.8–8)
NEUTS SEG NFR BLD AUTO: 69 % (ref 40–73)
NITRITE UR QL STRIP.AUTO: NEGATIVE
OPIATES UR QL: POSITIVE
PCP UR QL: NEGATIVE
PH UR STRIP: 5 [PH] (ref 5–8)
PLATELET # BLD AUTO: 444 K/UL (ref 135–420)
PMV BLD AUTO: 10.2 FL (ref 9.2–11.8)
POTASSIUM SERPL-SCNC: 4.1 MMOL/L (ref 3.5–5.5)
PROT UR STRIP-MCNC: ABNORMAL MG/DL
RBC # BLD AUTO: 5.21 M/UL (ref 4.2–5.3)
RBC #/AREA URNS HPF: ABNORMAL /HPF (ref 0–5)
SODIUM SERPL-SCNC: 140 MMOL/L (ref 136–145)
SP GR UR REFRACTOMETRY: >1.03 (ref 1–1.03)
UROBILINOGEN UR QL STRIP.AUTO: 1 EU/DL (ref 0.2–1)
WBC # BLD AUTO: 11.6 K/UL (ref 4.6–13.2)
WBC URNS QL MICRO: ABNORMAL /HPF (ref 0–5)

## 2017-06-17 PROCEDURE — 96375 TX/PRO/DX INJ NEW DRUG ADDON: CPT

## 2017-06-17 PROCEDURE — 96361 HYDRATE IV INFUSION ADD-ON: CPT

## 2017-06-17 PROCEDURE — 81001 URINALYSIS AUTO W/SCOPE: CPT | Performed by: INTERNAL MEDICINE

## 2017-06-17 PROCEDURE — 96374 THER/PROPH/DIAG INJ IV PUSH: CPT

## 2017-06-17 PROCEDURE — 85025 COMPLETE CBC W/AUTO DIFF WBC: CPT | Performed by: INTERNAL MEDICINE

## 2017-06-17 PROCEDURE — 74011250636 HC RX REV CODE- 250/636: Performed by: INTERNAL MEDICINE

## 2017-06-17 PROCEDURE — 99285 EMERGENCY DEPT VISIT HI MDM: CPT

## 2017-06-17 PROCEDURE — 87086 URINE CULTURE/COLONY COUNT: CPT | Performed by: INTERNAL MEDICINE

## 2017-06-17 PROCEDURE — 83690 ASSAY OF LIPASE: CPT | Performed by: INTERNAL MEDICINE

## 2017-06-17 PROCEDURE — 93005 ELECTROCARDIOGRAM TRACING: CPT

## 2017-06-17 PROCEDURE — 74022 RADEX COMPL AQT ABD SERIES: CPT

## 2017-06-17 PROCEDURE — 80307 DRUG TEST PRSMV CHEM ANLYZR: CPT | Performed by: INTERNAL MEDICINE

## 2017-06-17 PROCEDURE — 87186 SC STD MICRODIL/AGAR DIL: CPT | Performed by: INTERNAL MEDICINE

## 2017-06-17 PROCEDURE — 81025 URINE PREGNANCY TEST: CPT

## 2017-06-17 PROCEDURE — 80048 BASIC METABOLIC PNL TOTAL CA: CPT | Performed by: INTERNAL MEDICINE

## 2017-06-17 PROCEDURE — 87077 CULTURE AEROBIC IDENTIFY: CPT | Performed by: INTERNAL MEDICINE

## 2017-06-17 RX ORDER — ONDANSETRON 2 MG/ML
4 INJECTION INTRAMUSCULAR; INTRAVENOUS
Status: COMPLETED | OUTPATIENT
Start: 2017-06-17 | End: 2017-06-17

## 2017-06-17 RX ORDER — MORPHINE SULFATE 2 MG/ML
2 INJECTION, SOLUTION INTRAMUSCULAR; INTRAVENOUS
Status: COMPLETED | OUTPATIENT
Start: 2017-06-17 | End: 2017-06-17

## 2017-06-17 RX ORDER — SULFAMETHOXAZOLE AND TRIMETHOPRIM 800; 160 MG/1; MG/1
1 TABLET ORAL 2 TIMES DAILY
Qty: 14 TAB | Refills: 0 | Status: SHIPPED | OUTPATIENT
Start: 2017-06-17 | End: 2017-06-27

## 2017-06-17 RX ORDER — CELECOXIB 50 MG/1
50 CAPSULE ORAL 2 TIMES DAILY
COMMUNITY
End: 2018-07-03

## 2017-06-17 RX ORDER — ONDANSETRON 4 MG/1
TABLET, ORALLY DISINTEGRATING ORAL
Qty: 10 TAB | Refills: 0 | Status: SHIPPED | OUTPATIENT
Start: 2017-06-17 | End: 2017-10-12 | Stop reason: SDUPTHER

## 2017-06-17 RX ADMIN — SODIUM CHLORIDE 1000 ML: 900 INJECTION, SOLUTION INTRAVENOUS at 12:46

## 2017-06-17 RX ADMIN — Medication 2 MG: at 12:49

## 2017-06-17 RX ADMIN — ONDANSETRON 4 MG: 2 INJECTION INTRAMUSCULAR; INTRAVENOUS at 12:47

## 2017-06-17 NOTE — ED NOTES
Pt resting in stretcher in NAD at this time. Mother to bedside. Pt remains on CCM. Call light within reach. No needs verbalized at this time.

## 2017-06-17 NOTE — DISCHARGE INSTRUCTIONS
Urinary Tract Infection in Women: Care Instructions  Your Care Instructions    A urinary tract infection, or UTI, is a general term for an infection anywhere between the kidneys and the urethra (where urine comes out). Most UTIs are bladder infections. They often cause pain or burning when you urinate. UTIs are caused by bacteria and can be cured with antibiotics. Be sure to complete your treatment so that the infection goes away. Follow-up care is a key part of your treatment and safety. Be sure to make and go to all appointments, and call your doctor if you are having problems. It's also a good idea to know your test results and keep a list of the medicines you take. How can you care for yourself at home? · Take your antibiotics as directed. Do not stop taking them just because you feel better. You need to take the full course of antibiotics. · Drink extra water and other fluids for the next day or two. This may help wash out the bacteria that are causing the infection. (If you have kidney, heart, or liver disease and have to limit fluids, talk with your doctor before you increase your fluid intake.)  · Avoid drinks that are carbonated or have caffeine. They can irritate the bladder. · Urinate often. Try to empty your bladder each time. · To relieve pain, take a hot bath or lay a heating pad set on low over your lower belly or genital area. Never go to sleep with a heating pad in place. To prevent UTIs  · Drink plenty of water each day. This helps you urinate often, which clears bacteria from your system. (If you have kidney, heart, or liver disease and have to limit fluids, talk with your doctor before you increase your fluid intake.)  · Urinate when you need to. · Urinate right after you have sex. · Change sanitary pads often. · Avoid douches, bubble baths, feminine hygiene sprays, and other feminine hygiene products that have deodorants.   · After going to the bathroom, wipe from front to back.  When should you call for help? Call your doctor now or seek immediate medical care if:  · Symptoms such as fever, chills, nausea, or vomiting get worse or appear for the first time. · You have new pain in your back just below your rib cage. This is called flank pain. · There is new blood or pus in your urine. · You have any problems with your antibiotic medicine. Watch closely for changes in your health, and be sure to contact your doctor if:  · You are not getting better after taking an antibiotic for 2 days. · Your symptoms go away but then come back. Where can you learn more? Go to http://santos-tuan.info/. Enter A013 in the search box to learn more about \"Urinary Tract Infection in Women: Care Instructions. \"  Current as of: November 28, 2016  Content Version: 11.2  © 2476-0940 "[employer]", [employer]. Care instructions adapted under license by [employer] (which disclaims liability or warranty for this information). If you have questions about a medical condition or this instruction, always ask your healthcare professional. [address] any warranty or liability for your use of this information.

## 2017-06-17 NOTE — ED TRIAGE NOTES
Chest pain since Tuesday, nausea, vomiting, diarrhea since Wednesday. Sepsis Screening completed    (  )Patient meets SIRS criteria. ( x )Patient does not meet SIRS criteria.       SIRS Criteria is achieved when two or more of the following are present   Temperature < 96.8°F (36°C) or > 100.9°F (38.3°C)   Heart Rate > 90 beats per minute   Respiratory Rate > 20 breaths per minute   WBC count > 12,000 or <4,000 or > 10% bands

## 2017-06-17 NOTE — ED PROVIDER NOTES
[address]  EMERGENCY DEPARTMENT HISTORY AND PHYSICAL EXAM       Date: 2017   Patient Name: Viktoriya Sales   YOB: 1975  Medical Record Number: [id number]    History of Presenting Illness     Chief Complaint   Patient presents with    Vomiting    Diarrhea    Chest Pain        History Provided By:  patient    Additional History: 12:13 PM   Viktoriya Sales is a 38 y.o. female h/o fibromyalgia, chronic pain, who presents to the emergency department C/O vomiting daily for last 10 days when ever pt eats or drinks anything. Associated sx's include orange urine, green diarrhea x3 daily, nausea, congestion, body aches and post nasal drainage. Pt reports she is unable to keep any medication down. Pt reports due to her vomiting her fibromyalgia and interstitial cystitis is acting up. PSHx include hysterectomy. Pt smokes . 5 pack of cigarettes a day. Pt normally takes methadone and morphine for pain. Pt denies use of illicit drugs.      Primary Care Provider: Barbara Lora MD   Specialist:    Past History     Past Medical History:   Past Medical History:   Diagnosis Date    Anxiety     Arthritis     osteopenia    Chronic kidney disease     kidney stones    Chronic pain     bladder    Depression     Fibromyalgia     Interstitial cystitis     Nausea & vomiting     Other ill-defined conditions     chronic interstitual cyctitis    Other ill-defined conditions     endometriosis    Psychiatric disorder         Past Surgical History:   Past Surgical History:   Procedure Laterality Date    HX APPENDECTOMY      HX  SECTION      HX HEENT      oral surgery wisdom teeth extractiion    HX HYSTERECTOMY      LAVH    HX PELVIC LAPAROSCOPY      x 5    HX UROLOGICAL      cysto and hydrodistention x 4    HX UROLOGICAL      Interstim implant    HX UROLOGICAL  2013    revision interstim        Family History:   Family History   Problem Relation Age of Onset  Post-op Nausea/Vomiting Sister     Malignant Hyperthermia Neg Hx     Pseudocholinesterase Deficiency Neg Hx     Delayed Awakening Neg Hx     Emergence Delirium Neg Hx     Post-op Cognitive Dysfunction Neg Hx         Social History:   Social History   Substance Use Topics    Smoking status: Current Every Day Smoker     Packs/day: 0.50     Years: 10.00    Smokeless tobacco: Never Used    Alcohol use Yes      Comment: 1 every 2 months        Allergies: Allergies   Allergen Reactions    Augmentin [Amoxicillin-Pot Clavulanate] Anaphylaxis    Amoxicillin Hives and Itching     Itching of throat    Ciprofloxacin Other (comments)     Redness of arm above IV site    Darvocet A500 [Propoxyphene N-Acetaminophen] Hives    Macrobid [Nitrofurantoin Monohyd/M-Cryst] Hives    Nubain [Nalbuphine] Hives and Other (comments)     Muscle spasms    Phenergan [Promethazine] Other (comments)     Makes me feel like Im on fire from the inside out and causes involuntary muscle spasms.  Toradol [Ketorolac Tromethamine] Itching    Vibramycin [Doxycycline Calcium] Hives and Itching     Itching of the throat        Review of Systems   Review of Systems   HENT: Positive for congestion and postnasal drip. Gastrointestinal: Positive for diarrhea, nausea and vomiting. Musculoskeletal: Positive for myalgias. All other systems reviewed and are negative. Physical Exam  Vitals:    06/17/17 1141 06/17/17 1145 06/17/17 1246   BP: 118/87 116/87 100/64   Pulse: (!) 109 (!) 114 (!) 110   Resp: 18 14 15   Temp: 99.4 °F (37.4 °C)     SpO2: 97% 97% 95%   Weight: 61.2 kg (135 lb)     Height: 5' 1\" (1.549 m)         Physical Exam   Constitutional: She is oriented to person, place, and time. She appears well-developed and well-nourished. HENT:   Head: Normocephalic and atraumatic.    Right Ear: External ear normal.   Left Ear: External ear normal.   Nose: Nose normal.   Mouth/Throat: Oropharynx is clear and moist. Mucous membranes are dry. Mild erythema and slight drainage from middle turbinates    Eyes: Conjunctivae and EOM are normal. Pupils are equal, round, and reactive to light. Right eye exhibits no discharge. Left eye exhibits no discharge. No scleral icterus. Neck: Normal range of motion. Neck supple. No JVD present. No tracheal deviation present. Cardiovascular: Regular rhythm, normal heart sounds and intact distal pulses. Tachycardia present. Exam reveals no gallop and no friction rub. No murmur heard. Pulmonary/Chest: Effort normal and breath sounds normal.   Abdominal: Soft. Bowel sounds are normal. She exhibits no distension. There is no hepatosplenomegaly. There is tenderness in the suprapubic area. There is guarding. There is no rebound. No HSM   Musculoskeletal: Normal range of motion. She exhibits no edema. Good pedal pulses   Neurological: She is alert and oriented to person, place, and time. She has normal reflexes. No cranial nerve deficit. She exhibits normal muscle tone. Coordination normal.   No focal motor weakness. Skin: Skin is warm and dry. No rash noted. Multiple surgical healed scars on abd. Lower back tattoo no bleed or infection. Left lower arm tattoo. Left abd and right hip tattoo. Healed scar left knee. Telangiectasia on upper chest.     Psychiatric: Her behavior is normal. Her mood appears anxious. No lethal ideation or plan. No hallucinations. Nursing note and vitals reviewed.     Diagnostic Study Results     Labs -      Recent Results (from the past 12 hour(s))   URINALYSIS W/ RFLX MICROSCOPIC    Collection Time: 06/17/17 12:00 PM   Result Value Ref Range    Color YELLOW      Appearance CLEAR      Specific gravity >1.030 (H) 1.005 - 1.030    pH (UA) 5.0 5.0 - 8.0      Protein TRACE (A) NEG mg/dL    Glucose NEGATIVE  NEG mg/dL    Ketone 15 (A) NEG mg/dL    Bilirubin NEGATIVE  NEG      Blood NEGATIVE  NEG      Urobilinogen 1.0 0.2 - 1.0 EU/dL    Nitrites NEGATIVE  NEG Leukocyte Esterase SMALL (A) NEG     URINE MICROSCOPIC ONLY    Collection Time: 06/17/17 12:00 PM   Result Value Ref Range    WBC 4 to 10 0 - 5 /hpf    RBC NONE 0 - 5 /hpf    Epithelial cells 2+ 0 - 5 /lpf    Bacteria 1+ (A) NEG /hpf    Mucus 1+ (A) NEG /lpf   DRUG SCREEN, URINE    Collection Time: 06/17/17 12:00 PM   Result Value Ref Range    BENZODIAZEPINE NEGATIVE  NEG      BARBITURATES NEGATIVE  NEG      THC (TH-CANNABINOL) NEGATIVE  NEG      OPIATES POSITIVE (A) NEG      PCP(PHENCYCLIDINE) NEGATIVE  NEG      COCAINE NEGATIVE  NEG      AMPHETAMINE NEGATIVE  NEG      METHADONE POSITIVE (A) NEG      HDSCOM (NOTE)    CBC WITH AUTOMATED DIFF    Collection Time: 06/17/17 12:33 PM   Result Value Ref Range    WBC 11.6 4.6 - 13.2 K/uL    RBC 5.21 4.20 - 5.30 M/uL    HGB 14.9 12.0 - 16.0 g/dL    HCT 44.8 35.0 - 45.0 %    MCV 86.0 74.0 - 97.0 FL    MCH 28.6 24.0 - 34.0 PG    MCHC 33.3 31.0 - 37.0 g/dL    RDW 14.3 11.6 - 14.5 %    PLATELET 229 (H) 626 - 420 K/uL    MPV 10.2 9.2 - 11.8 FL    NEUTROPHILS 69 40 - 73 %    LYMPHOCYTES 25 21 - 52 %    MONOCYTES 5 3 - 10 %    EOSINOPHILS 0 0 - 5 %    BASOPHILS 1 0 - 2 %    ABS. NEUTROPHILS 8.0 1.8 - 8.0 K/UL    ABS. LYMPHOCYTES 2.9 0.9 - 3.6 K/UL    ABS. MONOCYTES 0.5 0.05 - 1.2 K/UL    ABS. EOSINOPHILS 0.0 0.0 - 0.4 K/UL    ABS.  BASOPHILS 0.1 (H) 0.0 - 0.06 K/UL    DF AUTOMATED     METABOLIC PANEL, BASIC    Collection Time: 06/17/17 12:33 PM   Result Value Ref Range    Sodium 140 136 - 145 mmol/L    Potassium 4.1 3.5 - 5.5 mmol/L    Chloride 100 100 - 108 mmol/L    CO2 24 21 - 32 mmol/L    Anion gap 16 3.0 - 18 mmol/L    Glucose 98 74 - 99 mg/dL    BUN 15 7.0 - 18 MG/DL    Creatinine 0.88 0.6 - 1.3 MG/DL    BUN/Creatinine ratio 17 12 - 20      GFR est AA >60 >60 ml/min/1.73m2    GFR est non-AA >60 >60 ml/min/1.73m2    Calcium 9.8 8.5 - 10.1 MG/DL   LIPASE    Collection Time: 06/17/17 12:33 PM   Result Value Ref Range    Lipase 107 73 - 393 U/L   HCG URINE, QL. - POC Collection Time: 06/17/17 12:35 PM   Result Value Ref Range    Pregnancy test,urine (POC) NEGATIVE  NEG     EKG, 12 LEAD, INITIAL    Collection Time: 06/17/17 12:42 PM   Result Value Ref Range    Ventricular Rate 112 BPM    Atrial Rate 112 BPM    P-R Interval 100 ms    QRS Duration 66 ms    Q-T Interval 342 ms    QTC Calculation (Bezet) 466 ms    Calculated P Axis 30 degrees    Calculated R Axis 23 degrees    Calculated T Axis -153 degrees    Diagnosis       Sinus tachycardia with short WI  Minimal voltage criteria for LVH, may be normal variant  Cannot rule out Anterior infarct , age undetermined  T wave abnormality, consider inferolateral ischemia  Abnormal ECG  When compared with ECG of 19-AUG-2015 20:31,  WI interval has decreased  T wave inversion less evident in Inferior leads  T wave inversion more evident in Lateral leads         Radiologic Studies -  The following have been ordered and reviewed:  XR ABD ACUTE W 1 V CHEST   Final Result   No acute findings in the chest abdomen or pelvis. As read by the radiologist.     2:57 PM  RADIOLOGY FINDINGS  abd X-ray shows NAP  Pending review by Radiologist  Recorded by Denzel Velasquez ED Scribe, as dictated by Charleen Henderson MD     Medical Decision Making   I am the first provider for this patient. I reviewed the vital signs, available nursing notes, past medical history, past surgical history, family history and social history. Ddx: UTI, gastroenteritis, gastritis, fibromyalgia. Rule out colitis. Pt does not appear septic. Vital Signs-Reviewed the patient's vital signs. Patient Vitals for the past 12 hrs:   Temp Pulse Resp BP SpO2   06/17/17 1246 - (!) 110 15 100/64 95 %   06/17/17 1145 - (!) 114 14 116/87 97 %   06/17/17 1141 99.4 °F (37.4 °C) (!) 109 18 118/87 97 %       Pulse Oximetry Analysis - Normal 97% on RA     Cardiac Monitor:   Rate: 112 bpm  Rhythm: Sinus Tachycardia     EKG interpretation: (Preliminary)  Rhythm: sinus tach with short WI. Rate (approx.): 112 bpm; no stemi. Minimal voltage criteria. T wave abnormality   EKG read by Charleen Henderson MD at 12:42    Procedures:   Procedures    ED Course:  12:13 PM  Initial assessment performed. The patients presenting problems have been discussed, and they are in agreement with the care plan formulated and outlined with them. I have encouraged them to ask questions as they arise throughout their visit. 3:08 PM Pt is requesting valium after having morphine. I explained I cannot add valium IV or second morphine dose. I offered Toradol but she declined as has allergy per her account. Given pain at this point it is mostly chronic will let her take her pain meds which takes methadone and morphine at home. . She has a mild UTI. X-ray and blood work are normal.     Medications Given in the ED:    Medications   sodium chloride 0.9 % bolus infusion 1,000 mL (1,000 mL IntraVENous New Bag 6/17/17 1246)   ondansetron (ZOFRAN) injection 4 mg (4 mg IntraVENous Given 6/17/17 1247)   morphine injection 2 mg (2 mg IntraVENous Given 6/17/17 1249)       Discharge Note:  2:59 PM  Pt has been reexamined. Patient has no new complaints, changes, or physical findings. Care plan outlined and precautions discussed. Results were reviewed with the patient. All medications were reviewed with the patient; will d/c home. All of pt's questions and concerns were addressed. Patient was instructed and agrees to follow up with PCP, as well as to return to the ED upon further deterioration. Patient is ready to go home. Diagnosis   Clinical Impression:   1. Urinary tract infection without hematuria, site unspecified    2.  Non-intractable vomiting with nausea, unspecified vomiting type       Discussion:    Follow-up Information     Follow up With Details Comments Contact Info    Po Ramos MD Schedule an appointment as soon as possible for a visit in 2 days  [address]  [address]  [phone] [address]  As needed, If symptoms worsen [address] [place]  [phone]          Current Discharge Medication List      START taking these medications    Details   ondansetron (ZOFRAN ODT) 4 mg disintegrating tablet Take 1-2 tablets every 6-8 hours as needed for nausea and vomiting. Qty: 10 Tab, Refills: 0      trimethoprim-sulfamethoxazole (BACTRIM DS) 160-800 mg per tablet Take 1 Tab by mouth two (2) times a day for 10 days. Qty: 14 Tab, Refills: 0             _______________________________   Attestations: This note is prepared by Afshin Dyer , acting as a Scribe for Bill Walton MD on 12:13 PM on 6/17/2017 . Bill Walton MD: The scribe's documentation has been prepared under my direction and personally reviewed by me in its entirety.   _______________________________

## 2017-06-19 LAB
ATRIAL RATE: 112 BPM
CALCULATED P AXIS, ECG09: 30 DEGREES
CALCULATED R AXIS, ECG10: 23 DEGREES
CALCULATED T AXIS, ECG11: -153 DEGREES
DIAGNOSIS, 93000: NORMAL
P-R INTERVAL, ECG05: 100 MS
Q-T INTERVAL, ECG07: 342 MS
QRS DURATION, ECG06: 66 MS
QTC CALCULATION (BEZET), ECG08: 466 MS
VENTRICULAR RATE, ECG03: 112 BPM

## 2017-06-20 LAB
BACTERIA SPEC CULT: ABNORMAL
SERVICE CMNT-IMP: ABNORMAL

## 2017-06-23 ENCOUNTER — OFFICE VISIT (OUTPATIENT)
Dept: PAIN MANAGEMENT | Age: 42
End: 2017-06-23

## 2017-06-23 VITALS
DIASTOLIC BLOOD PRESSURE: 94 MMHG | HEART RATE: 123 BPM | WEIGHT: 128 LBS | BODY MASS INDEX: 24.19 KG/M2 | SYSTOLIC BLOOD PRESSURE: 120 MMHG

## 2017-06-23 DIAGNOSIS — G89.29 INSOMNIA SECONDARY TO CHRONIC PAIN: ICD-10-CM

## 2017-06-23 DIAGNOSIS — M25.50 PAIN IN JOINT, MULTIPLE SITES: ICD-10-CM

## 2017-06-23 DIAGNOSIS — M79.2 NEUROPATHIC PAIN: ICD-10-CM

## 2017-06-23 DIAGNOSIS — G89.4 CHRONIC PAIN SYNDROME: ICD-10-CM

## 2017-06-23 DIAGNOSIS — G47.01 INSOMNIA SECONDARY TO CHRONIC PAIN: ICD-10-CM

## 2017-06-23 DIAGNOSIS — Z79.899 ENCOUNTER FOR LONG-TERM (CURRENT) USE OF HIGH-RISK MEDICATION: ICD-10-CM

## 2017-06-23 DIAGNOSIS — N30.10 INTERSTITIAL CYSTITIS: Primary | ICD-10-CM

## 2017-06-23 RX ORDER — DULOXETIN HYDROCHLORIDE 60 MG/1
60 CAPSULE, DELAYED RELEASE ORAL DAILY
Qty: 30 CAP | Refills: 5 | Status: SHIPPED | OUTPATIENT
Start: 2017-06-23 | End: 2017-07-23

## 2017-06-23 RX ORDER — MORPHINE SULFATE 15 MG/1
15 TABLET ORAL
Qty: 120 TAB | Refills: 0 | Status: SHIPPED | OUTPATIENT
Start: 2017-07-21 | End: 2017-08-20

## 2017-06-23 RX ORDER — METHADONE HYDROCHLORIDE 10 MG/1
15 TABLET ORAL 3 TIMES DAILY
Qty: 135 TAB | Refills: 0 | Status: SHIPPED | OUTPATIENT
Start: 2017-07-21 | End: 2017-08-22 | Stop reason: SDUPTHER

## 2017-06-23 RX ORDER — BACLOFEN 20 MG/1
20 TABLET ORAL 3 TIMES DAILY
Qty: 90 TAB | Refills: 5 | Status: SHIPPED | OUTPATIENT
Start: 2017-06-23 | End: 2017-07-23

## 2017-06-23 RX ORDER — TRAZODONE HYDROCHLORIDE 150 MG/1
150-300 TABLET ORAL
Qty: 60 TAB | Refills: 5 | Status: SHIPPED | OUTPATIENT
Start: 2017-06-23 | End: 2017-07-23

## 2017-06-23 RX ORDER — DULOXETIN HYDROCHLORIDE 60 MG/1
60 CAPSULE, DELAYED RELEASE ORAL DAILY
COMMUNITY
End: 2017-10-12 | Stop reason: SDUPTHER

## 2017-06-23 RX ORDER — METHADONE HYDROCHLORIDE 10 MG/1
15 TABLET ORAL 3 TIMES DAILY
Qty: 135 TAB | Refills: 0 | Status: SHIPPED | OUTPATIENT
Start: 2017-06-23 | End: 2017-10-12 | Stop reason: SDUPTHER

## 2017-06-23 RX ORDER — ONDANSETRON 4 MG/1
4 TABLET, ORALLY DISINTEGRATING ORAL
Qty: 90 TAB | Refills: 5 | Status: SHIPPED | OUTPATIENT
Start: 2017-06-23 | End: 2017-07-23

## 2017-06-23 RX ORDER — MORPHINE SULFATE 15 MG/1
15 TABLET ORAL
Qty: 120 TAB | Refills: 0 | Status: SHIPPED | OUTPATIENT
Start: 2017-06-23 | End: 2017-07-23

## 2017-06-23 NOTE — PROGRESS NOTES
HISTORY OF PRESENT ILLNESS  Tiffanie Barajas is a 38 y.o. female. HPI  Returns for followup of chronic, severe pain which is widespread and multifactorial and includes fibromyalgia as well as generalized osteoarthritis and interstitial cystitis. Since last seen, she has found the change in her medication to be quite helpful but not quite where she would like to be. Methadone will be increased to 15 mg 3 times daily for better long-acting pain control. Baclofen will be increased to 20 mg 3 times daily for relief of her muscle spasms  Trazodone will be added at 150 mg 1-2 tablets at bedtime for her chronic insomnia and Zofran, ODT, 4 mg, up to 3 times daily as needed for periodic nausea. Pain has been averaging 5-6 out of 10. Pain level today 6 out of 10, outcome 13/28,(The lower the upper number, the better the outcome)  Is ago activity and mobility as well as mood are fair, sleep is poor. No reported side effects. Review of Systems   Constitutional: Positive for malaise/fatigue. Gastrointestinal: Positive for abdominal pain (cramping) and constipation. Genitourinary: Positive for dysuria. H/o IC   Musculoskeletal: Positive for back pain, joint pain, myalgias and neck pain. Neurological: Positive for weakness (generalized). Psychiatric/Behavioral: The patient has insomnia. All other systems reviewed and are negative. Physical Exam   Constitutional: She is oriented to person, place, and time. She appears well-developed and well-nourished. No distress. HENT:   Head: Normocephalic. Eyes: EOM are normal. Pupils are equal, round, and reactive to light. Neck: No thyromegaly present. Tender throughout/painful ROM/tight musculature   Pulmonary/Chest: Effort normal.   Abdominal: There is tenderness (exquisite) in the suprapubic area. Musculoskeletal: She exhibits tenderness (throughout back/tight musculature). She exhibits no edema.         Right hip: She exhibits tenderness (over TB). Left hip: She exhibits tenderness (over TB). Right knee: Tenderness (medial aspect) found. Left knee: Tenderness (medial aspect) found. Cervical back: She exhibits pain. She exhibits normal range of motion and no spasm. Tenderness: tight musculature/to light and deep palpation. Thoracic back: She exhibits tenderness (to light and deep palpation) and pain. She exhibits normal range of motion. Lumbar back: She exhibits tenderness (to light and deep palpation/generalized/+SIJ tenderness bilaterally) and pain. She exhibits normal range of motion (painful) and no spasm. Neurological: She is alert and oriented to person, place, and time. No cranial nerve deficit. Gait (antalgic) abnormal.   Psychiatric: Her behavior is normal. Judgment and thought content normal. Her mood appears anxious. She exhibits a depressed mood. Nursing note and vitals reviewed. ASSESSMENT and PLAN  Encounter Diagnoses   Name Primary?  Interstitial cystitis Yes    Neuropathic pain     Insomnia secondary to chronic pain     Encounter for long-term (current) use of high-risk medication     Chronic pain syndrome     Pain in joint, multiple sites      Treatment plan as noted above. 2 month reassess her    No concerns are raised for misuse, abuse, or diversion. 1. Pain medications are prescribed with the objective of pain relief and improved physical and psychosocial function in this patient. 2. Counseled patient on proper use of prescribed medications and reviewed opioid contract. 3. Counseled patient about chronic medical conditions and their relationship to anxiety and depression and recommended mental health support as needed. 4. Reviewed with patient self-help tools, home exercise, and lifestyle changes to assist the patient in self-management of symptoms.   5. Advised patient to have a primary care provider to continue care for health maintenance and general medical conditions and support for referral to specialty care as needed. 6. Reviewed with patient the treatment plan, goals of treatment plan, and limitations of treatment plan, to include the potential for side effects from medications and procedures. If side effects occur, it is the responsibility of the patient to inform the clinic so that a change in the treatment plan can be made in a safe manner. The patient is advised that stopping prescribed medication may cause an increase in symptoms and possible medication withdrawal symptoms. The patient is informed an emergency room evaluation may be necessary if this occurs. DISPOSITION: The patients condition and plan were discussed at length and all questions were answered. The patient agrees with the plan.     Counseling occupied > 50% of visit:  Total time: 40 minutes

## 2017-06-23 NOTE — MR AVS SNAPSHOT
Visit Information Date & Time Provider Department Dept. Phone Encounter #  
 6/23/2017 10:00 AM Marjan Rowley MD [address] [facility] [address] [phone] Follow-up Instructions Return in about 2 months (around 8/23/2017). Upcoming Health Maintenance Date Due Pneumococcal 19-64 Medium Risk (1 of 1 - PPSV23) 2/16/1996 DTaP/Tdap/Td series (1 - Tdap) 2/16/1998 PAP AKA CERVICAL CYTOLOGY 2/16/1998 INFLUENZA AGE 9 TO ADULT 8/1/2017 Allergies as of 6/23/2017  Review Complete On: 6/23/2017 By: Marjan Rowley MD  
  
 Severity Noted Reaction Type Reactions Augmentin [Amoxicillin-pot Clavulanate] High 05/19/2013    Anaphylaxis Amoxicillin  04/04/2013   Topical Hives, Itching Itching of throat Ciprofloxacin  04/04/2013   Topical Other (comments) Redness of arm above IV site Darvocet A500 [Propoxyphene N-acetaminophen]  04/04/2013   Topical Hives Macrobid [Nitrofurantoin Monohyd/m-cryst]  07/23/2013   Side Effect Hives Nubain [Nalbuphine]  04/04/2013   Topical Hives, Other (comments) Muscle spasms Phenergan [Promethazine]  11/09/2016    Other (comments) Makes me feel like Im on fire from the inside out and causes involuntary muscle spasms. Toradol [Ketorolac Tromethamine]  01/11/2016    Itching Vibramycin [Doxycycline Calcium]  04/04/2013   Topical Hives, Itching Itching of the throat Current Immunizations  Never Reviewed No immunizations on file. Not reviewed this visit Vitals BP Pulse Weight(growth percentile) BMI OB Status Smoking Status (!) 120/94 (!) 123 128 lb (58.1 kg) 24.19 kg/m2 Hysterectomy Current Every Day Smoker BMI and BSA Data Body Mass Index Body Surface Area  
 24.19 kg/m 2 1.58 m 2 Preferred Pharmacy Pharmacy Name Phone 400 [facility]. Mai 50 [address]62 [address] [place] [address] [phone] Your Updated Medication List  
  
   
 This list is accurate as of: 6/23/17 10:36 AM.  Always use your most recent med list.  
  
  
  
  
 * baclofen 10 mg tablet Commonly known as:  LIORESAL Take 1/2 to 1 tab po up to three times per day for muscle spasms * baclofen 20 mg tablet Commonly known as:  LIORESAL Take 1 Tab by mouth three (3) times daily for 30 days. CeleBREX 50 mg capsule Generic drug:  Celecoxib Take 50 mg by mouth two (2) times a day. * CYMBALTA 60 mg capsule Generic drug:  DULoxetine Take 60 mg by mouth daily. * DULoxetine 60 mg capsule Commonly known as:  CYMBALTA Take 1 Cap by mouth daily for 30 days. Indications: FIBROMYALGIA  
  
 EPINEPHrine 0.3 mg/0.3 mL (1:1,000) injection Commonly known as:  AUVI-Q  
0.3 mL by IntraMUSCular route once as needed for Other (severe allergic reaction ) for 1 dose. gabapentin 600 mg tablet Commonly known as:  NEURONTIN Take 1 Tab by mouth three (3) times daily. For neuropathic pain * methadone 10 mg tablet Commonly known as:  DOLOPHINE Take 1.5 Tabs by mouth three (3) times daily for 30 days. Max Daily Amount: 45 mg. Indications: Chronic Pain, Severe Pain * methadone 10 mg tablet Commonly known as:  DOLOPHINE Take 1.5 Tabs by mouth three (3) times daily for 30 days. Max Daily Amount: 45 mg. Indications: Chronic Pain, Severe Pain Start taking on:  7/21/2017 * morphine IR 15 mg tablet Commonly known as:  MS IR Take 1 Tab by mouth four (4) times daily as needed for Pain for up to 30 days. Max Daily Amount: 60 mg. Indications: Pain, Severe Pain * morphine IR 15 mg tablet Commonly known as:  MS IR Take 1 Tab by mouth four (4) times daily as needed for Pain for up to 30 days. Max Daily Amount: 60 mg. Indications: Pain, Severe Pain Start taking on:  7/21/2017 NARCAN IJ  
by Injection route. * ondansetron 4 mg disintegrating tablet Commonly known as:  ZOFRAN ODT  
 Take 1-2 tablets every 6-8 hours as needed for nausea and vomiting. * ondansetron 4 mg disintegrating tablet Commonly known as:  ZOFRAN ODT Take 1 Tab by mouth every eight (8) hours as needed for Nausea for up to 30 days. traZODone 150 mg tablet Commonly known as:  [place]uda Belling Take 1-2 Tabs by mouth nightly for 30 days. trimethoprim-sulfamethoxazole 160-800 mg per tablet Commonly known as:  BACTRIM DS Take 1 Tab by mouth two (2) times a day for 10 days. * Notice: This list has 10 medication(s) that are the same as other medications prescribed for you. Read the directions carefully, and ask your doctor or other care provider to review them with you. Prescriptions Printed Refills  
 methadone (DOLOPHINE) 10 mg tablet 0 Starting on: 7/21/2017 Sig: Take 1.5 Tabs by mouth three (3) times daily for 30 days. Max Daily Amount: 45 mg. Indications: Chronic Pain, Severe Pain Class: Print Route: Oral  
 methadone (DOLOPHINE) 10 mg tablet 0 Sig: Take 1.5 Tabs by mouth three (3) times daily for 30 days. Max Daily Amount: 45 mg. Indications: Chronic Pain, Severe Pain Class: Print Route: Oral  
 morphine IR (MS IR) 15 mg tablet 0 Starting on: 7/21/2017 Sig: Take 1 Tab by mouth four (4) times daily as needed for Pain for up to 30 days. Max Daily Amount: 60 mg. Indications: Pain, Severe Pain Class: Print Route: Oral  
 morphine IR (MS IR) 15 mg tablet 0 Sig: Take 1 Tab by mouth four (4) times daily as needed for Pain for up to 30 days. Max Daily Amount: 60 mg. Indications: Pain, Severe Pain Class: Print Route: Oral  
  
Prescriptions Sent to Pharmacy Refills  
 ondansetron (ZOFRAN ODT) 4 mg disintegrating tablet 5 Sig: Take 1 Tab by mouth every eight (8) hours as needed for Nausea for up to 30 days. Class: Normal  
 Pharmacy: [address]  #: [phone]  Route: Oral  
 traZODone (DESYREL) 150 mg tablet 5 Sig: Take 1-2 Tabs by mouth nightly for 30 days. Class: Normal  
 Pharmacy: [address] [place] [address] Ph #: [phone] Route: Oral  
 baclofen (LIORESAL) 20 mg tablet 5 Sig: Take 1 Tab by mouth three (3) times daily for 30 days. Class: Normal  
 Pharmacy: [address] [place] [address] Ph #: [phone] Route: Oral  
 DULoxetine (CYMBALTA) 60 mg capsule 5 Sig: Take 1 Cap by mouth daily for 30 days. Indications: FIBROMYALGIA Class: Normal  
 Pharmacy: [address] [place] [address] Ph #: [phone] Route: Oral  
  
Follow-up Instructions Return in about 2 months (around 8/23/2017). Patient Instructions Current health maintenance issues were reviewed and the patient was advised to followup with his/her PCP for completion of these items. Introducing [facility]! Dear Alexx Ardon: Thank you for requesting a ViewCast account. Our records indicate that you already have an active ViewCast account. You can access your account anytime at [email or web address] Did you know that you can access your hospital and ER discharge instructions at any time in ViewCast? You can also review all of your test results from your hospital stay or ER visit. Additional Information If you have questions, please visit the Frequently Asked Questions section of the ViewCast website at [email or web address] Remember, ViewCast is NOT to be used for urgent needs. For medical emergencies, dial 911. Now available from your iPhone and Android! Please provide this summary of care documentation to your next provider. Your primary care clinician is listed as [address] [place] [address]. If you have any questions after today's visit, please call [phone].

## 2017-06-23 NOTE — PROGRESS NOTES
Nursing Notes    Patient presents to the office today in follow-up. Patient rates her pain at 5/10 on the numerical pain scale. Reviewed medications with counts as follows:    Rx Date filled Qty Dispensed Pill Count Last Dose Short   Methadone 10 5/23/17 90 2 6/23/17 no   MS IR 15 5/23/17 120 1 6/23/17 no         Comments:     POC UDS was not performed in office today    Any new labs or imaging since last appointment? YES, blood work at ED, EKG, xrays of stomach and abdomen    Have you been to an emergency room (ER) or urgent care clinic since your last visit? YES  Feeling sick , dehydrated    Have you been hospitalized since your last visit? NO     If yes, where, when, and reason for visit? Have you seen or consulted any other health care providers outside of the [address] [place] [address]  since your last visit? NO     If yes, where, when, and reason for visit? Ms. Dali Brown has a reminder for a \"due or due soon\" health maintenance. I have asked that she contact her primary care provider for follow-up on this health maintenance.

## 2017-08-22 ENCOUNTER — OFFICE VISIT (OUTPATIENT)
Dept: PAIN MANAGEMENT | Age: 42
End: 2017-08-22

## 2017-08-22 VITALS
BODY MASS INDEX: 25.38 KG/M2 | HEART RATE: 92 BPM | RESPIRATION RATE: 20 BRPM | SYSTOLIC BLOOD PRESSURE: 104 MMHG | WEIGHT: 134.3 LBS | TEMPERATURE: 97.3 F | DIASTOLIC BLOOD PRESSURE: 75 MMHG

## 2017-08-22 DIAGNOSIS — M79.7 FIBROMYALGIA: ICD-10-CM

## 2017-08-22 DIAGNOSIS — N30.10 INTERSTITIAL CYSTITIS: Primary | ICD-10-CM

## 2017-08-22 DIAGNOSIS — G47.01 INSOMNIA SECONDARY TO CHRONIC PAIN: ICD-10-CM

## 2017-08-22 DIAGNOSIS — G89.29 INSOMNIA SECONDARY TO CHRONIC PAIN: ICD-10-CM

## 2017-08-22 DIAGNOSIS — M25.50 PAIN IN JOINT, MULTIPLE SITES: ICD-10-CM

## 2017-08-22 DIAGNOSIS — F41.0 PANIC DISORDER: ICD-10-CM

## 2017-08-22 DIAGNOSIS — Z79.899 ENCOUNTER FOR LONG-TERM (CURRENT) USE OF HIGH-RISK MEDICATION: ICD-10-CM

## 2017-08-22 DIAGNOSIS — M79.2 NEUROPATHIC PAIN: ICD-10-CM

## 2017-08-22 RX ORDER — PHENAZOPYRIDINE HYDROCHLORIDE 200 MG/1
200 TABLET, FILM COATED ORAL
Qty: 10 TAB | Refills: 1 | Status: SHIPPED | OUTPATIENT
Start: 2017-08-22 | End: 2017-08-25

## 2017-08-22 RX ORDER — METHADONE HYDROCHLORIDE 10 MG/1
15 TABLET ORAL 3 TIMES DAILY
Qty: 135 TAB | Refills: 0 | Status: SHIPPED | OUTPATIENT
Start: 2017-09-20 | End: 2017-10-12 | Stop reason: SDUPTHER

## 2017-08-22 RX ORDER — METHADONE HYDROCHLORIDE 10 MG/1
15 TABLET ORAL 3 TIMES DAILY
Qty: 135 TAB | Refills: 0 | Status: SHIPPED | OUTPATIENT
Start: 2017-08-22 | End: 2017-08-22 | Stop reason: SDUPTHER

## 2017-08-22 RX ORDER — DOXEPIN HYDROCHLORIDE 25 MG/1
25 CAPSULE ORAL
Qty: 30 CAP | Refills: 5 | Status: SHIPPED | OUTPATIENT
Start: 2017-08-22 | End: 2017-10-12 | Stop reason: SDUPTHER

## 2017-08-22 RX ORDER — VARENICLINE TARTRATE 1 MG/1
1 TABLET, FILM COATED ORAL 2 TIMES DAILY
Qty: 60 TAB | Refills: 2 | Status: SHIPPED | OUTPATIENT
Start: 2017-08-22 | End: 2018-07-03

## 2017-08-22 RX ORDER — HYDROMORPHONE HYDROCHLORIDE 8 MG/1
8 TABLET ORAL
Qty: 120 TAB | Refills: 0 | Status: SHIPPED | OUTPATIENT
Start: 2017-08-22 | End: 2017-08-22 | Stop reason: SDUPTHER

## 2017-08-22 RX ORDER — HYDROMORPHONE HYDROCHLORIDE 8 MG/1
8 TABLET ORAL
Qty: 120 TAB | Refills: 0 | Status: SHIPPED | OUTPATIENT
Start: 2017-09-20 | End: 2017-10-12 | Stop reason: SDUPTHER

## 2017-08-22 RX ORDER — VARENICLINE TARTRATE 25 MG
1 KIT ORAL
Qty: 1 DOSE PACK | Refills: 0 | Status: SHIPPED | OUTPATIENT
Start: 2017-08-22 | End: 2018-07-03

## 2017-08-22 NOTE — PROGRESS NOTES
HISTORY OF PRESENT ILLNESS  Shea Kaufman is a 38 y.o. female. Returns for followup of chronic, severe pain which is widespread and multifactorial and includes fibromyalgia as well as generalized osteoarthritis and interstitial cystitis. She reports that she has developed a pruritic rash since starting morphine 15 mg. Pain remains constant and at times severe, but she feels that the change to methadone has been much more effective for her chronic IC and fibromyalgia pain than was fentanyl patches. She describes her IC pain as burning, cramping, and stabbing. Pain is constant but is worse with certain activities, stress, and foods. We discussed the risks and limitations of using high dose opioids in the treatment of chronic, central pain syndromes such as fibromyalgia and IC. She asserts that these medications do improve both her pain and quality of life and functionality. She still has not been able to return to work since January 2015 and is in the process of applying for disability. She reports that her bladder stim device has improved urgency/frequency but not pain. Pt reports average of 6-7/10 pain scale most days with medications. She understands that we must document significant improvements in pain and functionality (at least 50% improvement) to justify continuing such high doses of opioids. She also requests a new starter pack for Chantix to help her quit smoking. She has used this before with success. She also requests a change in her sleeping medication, as 300 mg trazodone is no longer effective. She continues to suffer with chronic insomnia and poor, nonrestorative sleep. We will change to doxepin 25 mg nightly. Dosing and side effects discussed at length. A total of 50 minutes was spent with the patient of which more than 50% of the time was spent counseling the patient. HPI--see above    ROS  Constitutional: Positive for malaise/fatigue.    Gastrointestinal: Positive for abdominal pain (cramping) and constipation. Genitourinary: Positive for dysuria. H/o IC   Musculoskeletal: Positive for back pain, joint pain, myalgias and neck pain. Neurological: Positive for weakness (generalized). Psychiatric/Behavioral: The patient has insomnia. All other systems reviewed and are negative. Physical Exam  Constitutional: She is oriented to person, place, and time. She appears well-developed and well-nourished. No distress. HENT:   Head: Normocephalic. Eyes: EOM are normal. Pupils are equal, round, and reactive to light. Neck:   Tender throughout/painful ROM/tight musculature   Pulmonary/Chest: Effort normal.   Abdominal: There is tenderness (exquisite) in the suprapubic area. Musculoskeletal: She exhibits tenderness (throughout back/tight musculature). She exhibits no edema. Right hip: She exhibits tenderness (over TB). Left hip: She exhibits tenderness (over TB). Right knee: Tenderness (medial aspect) found. Left knee: Tenderness (medial aspect) found. Cervical back: She exhibits tenderness, pain and spasm. She exhibits normal range of motion. Thoracic back: She exhibits tenderness (to light and deep palpation) and pain. She exhibits normal range of motion. Lumbar back: She exhibits tenderness (to light and deep palpation/generalized/+SIJ tenderness bilaterally) and pain. She exhibits normal range of motion (painful) and no spasm. Back:    Neurological: She is alert and oriented to person, place, and time. No cranial nerve deficit. Gait (antalgic) abnormal.   Psychiatric: Her behavior is normal. Judgment and thought content normal. Her mood appears anxious. She exhibits a depressed mood. ASSESSMENT and PLAN    ICD-10-CM ICD-9-CM    1. Interstitial cystitis N30.10 595.1    2. Neuropathic pain M79.2 729.2    3. Insomnia secondary to chronic pain G89.29 338.29     G47.01 327.01    4.  Encounter for long-term (current) use of high-risk medication Z79.899 V58.69    5. Fibromyalgia M79.7 729.1    6. Pain in joint, multiple sites M25.50 719.49    7.  Panic disorder F41.0 300.01       Plan:  Continue same dose of methadone as prescribed for chronic pain  Discontinue morphine 15 mg--rash  Change back to dilaudid 8 mg max of four times daily as needed  Follow up in 2 months or sooner if needed  Regular exercise and attention to emotional health and diet remain the most effective ways to treat chronic pain of all kinds  You may contact me with questions or concerns through [employer]

## 2017-08-22 NOTE — PROGRESS NOTES
Nursing Notes    Patient presents to the office today in follow-up. Patient rates her pain at 6/10 on the numerical pain scale. Reviewed medications with counts as follows:    Rx Date filled Qty Dispensed Pill Count Last Dose Short     Methadone 10mg 07/22/17 135 2 This am  No    Morphine sulfate 15mg  07/22/17 120 5 This am  No                                     Comments:     POC UDS was not performed in office today    Any new labs or imaging since last appointment? NO    Have you been to an emergency room (ER) or urgent care clinic since your last visit? NO            Have you been hospitalized since your last visit? NO     If yes, where, when, and reason for visit? Have you seen or consulted any other health care providers outside of the [address] [place] [address]  since your last visit? NO     If yes, where, when, and reason for visit? HM deferred to pcp.

## 2017-08-22 NOTE — MR AVS SNAPSHOT
Visit Information Date & Time Provider Department Dept. Phone Encounter #  
 8/22/2017  9:20 AM L[facility] [phone] Follow-up Instructions Return in about 2 months (around 10/22/2017). Follow-up and Disposition History Upcoming Health Maintenance Date Due Pneumococcal 19-64 Medium Risk (1 of 1 - PPSV23) 2/16/1996 DTaP/Tdap/Td series (1 - Tdap) 2/16/1998 PAP AKA CERVICAL CYTOLOGY 2/16/1998 INFLUENZA AGE 9 TO ADULT 8/1/2017 Allergies as of 8/22/2017  Review Complete On: 8/22/2017 By: Melanie Pennington LPN Severity Noted Reaction Type Reactions Augmentin [Amoxicillin-pot Clavulanate] High 05/19/2013    Anaphylaxis Amoxicillin  04/04/2013   Topical Hives, Itching Itching of throat Ciprofloxacin  04/04/2013   Topical Other (comments) Redness of arm above IV site Darvocet A500 [Propoxyphene N-acetaminophen]  04/04/2013   Topical Hives Macrobid [Nitrofurantoin Monohyd/m-cryst]  07/23/2013   Side Effect Hives Nubain [Nalbuphine]  04/04/2013   Topical Hives, Other (comments) Muscle spasms Phenergan [Promethazine]  11/09/2016    Other (comments) Makes me feel like Im on fire from the inside out and causes involuntary muscle spasms. Toradol [Ketorolac Tromethamine]  01/11/2016    Itching Vibramycin [Doxycycline Calcium]  04/04/2013   Topical Hives, Itching Itching of the throat Current Immunizations  Never Reviewed No immunizations on file. Not reviewed this visit You Were Diagnosed With   
  
 Codes Comments Interstitial cystitis    -  Primary ICD-10-CM: N30.10 ICD-9-CM: 595.1 Neuropathic pain     ICD-10-CM: M79.2 ICD-9-CM: 729.2 Insomnia secondary to chronic pain     ICD-10-CM: G89.29, G47.01 
ICD-9-CM: 338.29, 327.01 Encounter for long-term (current) use of high-risk medication     ICD-10-CM: Z79.899 ICD-9-CM: V58.69 Fibromyalgia     ICD-10-CM: M79.7 ICD-9-CM: 729.1 Pain in joint, multiple sites     ICD-10-CM: M25.50 ICD-9-CM: 719.49 Panic disorder     ICD-10-CM: F41.0 ICD-9-CM: 300.01 Vitals BP Pulse Temp Resp Weight(growth percentile) BMI  
 104/75 (BP 1 Location: Left arm, BP Patient Position: Sitting) 92 97.3 °F (36.3 °C) (Oral) 20 134 lb 4.8 oz (60.9 kg) 25.38 kg/m2 OB Status Smoking Status Hysterectomy Current Every Day Smoker Vitals History BMI and BSA Data Body Mass Index Body Surface Area  
 25.38 kg/m 2 1.62 m 2 Preferred Pharmacy Pharmacy Name Phone 400 [facility]. Mai 10 [address]20 [address] [place] [address] [phone] Your Updated Medication List  
  
   
This list is accurate as of: 8/22/17 10:57 AM.  Always use your most recent med list.  
  
  
  
  
 baclofen 10 mg tablet Commonly known as:  LIORESAL Take 1/2 to 1 tab po up to three times per day for muscle spasms CeleBREX 50 mg capsule Generic drug:  Celecoxib Take 50 mg by mouth two (2) times a day. CYMBALTA 60 mg capsule Generic drug:  DULoxetine Take 60 mg by mouth daily. doxepin 25 mg capsule Commonly known as:  SINEquan Take 1 Cap by mouth nightly. For chronic insomnia EPINEPHrine 0.3 mg/0.3 mL (1:1,000) injection Commonly known as:  AUVI-Q  
0.3 mL by IntraMUSCular route once as needed for Other (severe allergic reaction ) for 1 dose. gabapentin 600 mg tablet Commonly known as:  NEURONTIN Take 1 Tab by mouth three (3) times daily. For neuropathic pain HYDROmorphone 8 mg tablet Commonly known as:  DILAUDID Take 1 Tab by mouth four (4) times daily as needed for Pain for up to 30 days. For breakthrough pain. Indications: Pain Start taking on:  9/20/2017  
  
 methadone 10 mg tablet Commonly known as:  DOLOPHINE Take 1.5 Tabs by mouth three (3) times daily for 30 days.  Max Daily Amount: 45 mg. Indications: Chronic Pain, Severe Pain Start taking on:  9/20/2017 NARCAN IJ  
by Injection route. ondansetron 4 mg disintegrating tablet Commonly known as:  ZOFRAN ODT Take 1-2 tablets every 6-8 hours as needed for nausea and vomiting. phenazopyridine 200 mg tablet Commonly known as:  PYRIDIUM Take 1 Tab by mouth three (3) times daily as needed for Pain for up to 3 days. * varenicline 0.5 mg (11)- 1 mg (42) Dspk Commonly known as:  Chantix Take 1 mg by mouth two (2) times daily (after meals). Indications: SMOKING CESSATION  
  
 * varenicline 1 mg tablet Commonly known as:  Danitza Franzan Take 1 Tab by mouth two (2) times a day. Take with meals * Notice: This list has 2 medication(s) that are the same as other medications prescribed for you. Read the directions carefully, and ask your doctor or other care provider to review them with you. Prescriptions Printed Refills HYDROmorphone (DILAUDID) 8 mg tablet 0 Starting on: 9/20/2017 Sig: Take 1 Tab by mouth four (4) times daily as needed for Pain for up to 30 days. For breakthrough pain. Indications: Pain Class: Print Route: Oral  
 methadone (DOLOPHINE) 10 mg tablet 0 Starting on: 9/20/2017 Sig: Take 1.5 Tabs by mouth three (3) times daily for 30 days. Max Daily Amount: 45 mg. Indications: Chronic Pain, Severe Pain Class: Print Route: Oral  
 varenicline (CHANTIX) 0.5 mg (11)- 1 mg (42) DsPk 0 Sig: Take 1 mg by mouth two (2) times daily (after meals). Indications: SMOKING CESSATION Class: Print Route: Oral  
 varenicline (CHANTIX) 1 mg tablet 2 Sig: Take 1 Tab by mouth two (2) times a day. Take with meals Class: Print Route: Oral  
 phenazopyridine (PYRIDIUM) 200 mg tablet 1 Sig: Take 1 Tab by mouth three (3) times daily as needed for Pain for up to 3 days. Class: Print Route: Oral  
  
Prescriptions Sent to Pharmacy Refills doxepin (SINEQUAN) 25 mg capsule 5 Sig: Take 1 Cap by mouth nightly. For chronic insomnia Class: Normal  
 Pharmacy: [address] [facility] #: [phone] Route: Oral  
  
Follow-up Instructions Return in about 2 months (around 10/22/2017). Patient Instructions Plan: 
Continue same dose of methadone as prescribed for chronic pain Discontinue morphine 15 mg--rash Change back to dilaudid 8 mg max of four times daily as needed Follow up in 2 months or sooner if needed Regular exercise and attention to emotional health and diet remain the most effective ways to treat chronic pain of all kinds You may contact me with questions or concerns through [address] [facility]! Dear You: Thank you for requesting a Elegant Service account. Our records indicate that you already have an active E[employer]ant Service account. You can access your account anytime at [email or web address] Did you know that you can access your hospital and ER discharge instructions at any time in [employer] Service? You can also review all of your test results from your hospital stay or ER visit. Additional Information If you have questions, please visit the Frequently Asked Questions section of the [employer] website at [email or web address] Remember, E[employer]ant Service is NOT to be used for urgent needs. For medical emergencies, dial 911. Now available from your iPhone and Android! Please provide this summary of care documentation to your next provider. Your primary care clinician is listed as Miroslava Michael. If you have any questions after today's visit, please call [phone].

## 2017-10-12 ENCOUNTER — OFFICE VISIT (OUTPATIENT)
Dept: PAIN MANAGEMENT | Age: 42
End: 2017-10-12

## 2017-10-12 VITALS
DIASTOLIC BLOOD PRESSURE: 85 MMHG | HEART RATE: 102 BPM | WEIGHT: 134 LBS | RESPIRATION RATE: 20 BRPM | TEMPERATURE: 98.6 F | SYSTOLIC BLOOD PRESSURE: 122 MMHG | BODY MASS INDEX: 25.32 KG/M2

## 2017-10-12 DIAGNOSIS — M79.2 NEUROPATHIC PAIN: ICD-10-CM

## 2017-10-12 DIAGNOSIS — N30.10 INTERSTITIAL CYSTITIS: ICD-10-CM

## 2017-10-12 DIAGNOSIS — R10.84 GENERALIZED ABDOMINAL PAIN: Primary | ICD-10-CM

## 2017-10-12 DIAGNOSIS — G47.01 INSOMNIA SECONDARY TO CHRONIC PAIN: ICD-10-CM

## 2017-10-12 DIAGNOSIS — G89.4 CHRONIC PAIN SYNDROME: ICD-10-CM

## 2017-10-12 DIAGNOSIS — Z79.899 ENCOUNTER FOR LONG-TERM (CURRENT) USE OF HIGH-RISK MEDICATION: ICD-10-CM

## 2017-10-12 DIAGNOSIS — G89.29 INSOMNIA SECONDARY TO CHRONIC PAIN: ICD-10-CM

## 2017-10-12 RX ORDER — HYDROMORPHONE HYDROCHLORIDE 8 MG/1
8 TABLET ORAL
Qty: 90 TAB | Refills: 0 | Status: SHIPPED | OUTPATIENT
Start: 2017-10-12 | End: 2017-10-12 | Stop reason: SDUPTHER

## 2017-10-12 RX ORDER — DOXEPIN HYDROCHLORIDE 25 MG/1
25 CAPSULE ORAL
Qty: 30 CAP | Refills: 2 | Status: SHIPPED | OUTPATIENT
Start: 2017-10-12 | End: 2018-07-03

## 2017-10-12 RX ORDER — METHADONE HYDROCHLORIDE 10 MG/1
15 TABLET ORAL 3 TIMES DAILY
Qty: 135 TAB | Refills: 0 | Status: SHIPPED | OUTPATIENT
Start: 2017-11-11 | End: 2017-12-11

## 2017-10-12 RX ORDER — BACLOFEN 10 MG/1
TABLET ORAL
Qty: 90 TAB | Refills: 5 | Status: SHIPPED | OUTPATIENT
Start: 2017-10-12 | End: 2018-01-09 | Stop reason: SDUPTHER

## 2017-10-12 RX ORDER — GABAPENTIN 600 MG/1
600 TABLET ORAL 3 TIMES DAILY
Qty: 90 TAB | Refills: 2 | Status: SHIPPED | OUTPATIENT
Start: 2017-10-12 | End: 2018-01-09 | Stop reason: SDUPTHER

## 2017-10-12 RX ORDER — HYDROMORPHONE HYDROCHLORIDE 8 MG/1
8 TABLET ORAL
Qty: 90 TAB | Refills: 0 | Status: SHIPPED | OUTPATIENT
Start: 2017-12-10 | End: 2018-01-09 | Stop reason: SDUPTHER

## 2017-10-12 RX ORDER — DULOXETIN HYDROCHLORIDE 60 MG/1
60 CAPSULE, DELAYED RELEASE ORAL DAILY
Qty: 30 CAP | Refills: 2 | Status: SHIPPED | OUTPATIENT
Start: 2017-10-12 | End: 2018-01-09 | Stop reason: SDUPTHER

## 2017-10-12 RX ORDER — METHADONE HYDROCHLORIDE 10 MG/1
15 TABLET ORAL 3 TIMES DAILY
Qty: 135 TAB | Refills: 0 | Status: SHIPPED | OUTPATIENT
Start: 2017-10-12 | End: 2017-10-12 | Stop reason: SDUPTHER

## 2017-10-12 RX ORDER — METHADONE HYDROCHLORIDE 10 MG/1
15 TABLET ORAL 3 TIMES DAILY
Qty: 135 TAB | Refills: 0 | Status: SHIPPED | OUTPATIENT
Start: 2017-10-12 | End: 2018-01-09 | Stop reason: SDUPTHER

## 2017-10-12 RX ORDER — HYDROMORPHONE HYDROCHLORIDE 8 MG/1
8 TABLET ORAL
Qty: 90 TAB | Refills: 0 | Status: SHIPPED | OUTPATIENT
Start: 2017-11-11 | End: 2017-10-12 | Stop reason: SDUPTHER

## 2017-10-12 RX ORDER — METHADONE HYDROCHLORIDE 10 MG/1
15 TABLET ORAL 3 TIMES DAILY
Qty: 135 TAB | Refills: 0 | Status: SHIPPED | OUTPATIENT
Start: 2017-11-11 | End: 2018-04-06 | Stop reason: SDUPTHER

## 2017-10-12 RX ORDER — ONDANSETRON 4 MG/1
TABLET, ORALLY DISINTEGRATING ORAL
Qty: 10 TAB | Refills: 0 | Status: SHIPPED | OUTPATIENT
Start: 2017-10-12 | End: 2018-10-01

## 2017-10-12 NOTE — MR AVS SNAPSHOT
Neuropathic pain     ICD-10-CM: M79.2 ICD-9-CM: 729.2 Chronic pain syndrome     ICD-10-CM: G89.4 ICD-9-CM: 338. 4 Vitals BP Pulse Temp Resp Weight(growth percentile) BMI  
 122/85 (!) 102 98.6 °F (37 °C) 20 134 lb (60.8 kg) 25.32 kg/m2 OB Status Smoking Status Hysterectomy Current Every Day Smoker Vitals History BMI and BSA Data Body Mass Index Body Surface Area  
 25.32 kg/m 2 1.62 m 2 Preferred Pharmacy Pharmacy Name Phone [facility]. [address]  [phone] Your Updated Medication List  
  
   
This list is accurate as of: 10/12/17  1:23 PM.  Always use your most recent med list.  
  
  
  
  
 baclofen 10 mg tablet Commonly known as:  LIORESAL Take 1/2 to 1 tab po up to three times per day for muscle spasms CeleBREX 50 mg capsule Generic drug:  Celecoxib Take 50 mg by mouth two (2) times a day. doxepin 25 mg capsule Commonly known as:  SINEquan Take 1 Cap by mouth nightly. For chronic insomnia DULoxetine 60 mg capsule Commonly known as:  CYMBALTA Take 1 Cap by mouth daily. EPINEPHrine 0.3 mg/0.3 mL (1:1,000) injection Commonly known as:  AUVI-Q  
0.3 mL by IntraMUSCular route once as needed for Other (severe allergic reaction ) for 1 dose. gabapentin 600 mg tablet Commonly known as:  NEURONTIN Take 1 Tab by mouth three (3) times daily. For neuropathic pain HYDROmorphone 8 mg tablet Commonly known as:  DILAUDID Take 1 Tab by mouth three (3) times daily as needed for Pain for up to 30 days. Max Daily Amount: 24 mg. For breakthrough pain. Indications: Pain Start taking on:  12/10/2017 * methadone 10 mg tablet Commonly known as:  DOLOPHINE Take 1.5 Tabs by mouth three (3) times daily for 30 days. Max Daily Amount: 45 mg. Indications: Chronic Pain, Severe Pain * methadone 10 mg tablet Commonly known as:  DOLOPHINE  
 Take 1.5 Tabs by mouth three (3) times daily for 30 days. Max Daily Amount: 45 mg. Indications: Chronic Pain, Severe Pain Start taking on:  11/11/2017 * methadone 10 mg tablet Commonly known as:  DOLOPHINE Take 1.5 Tabs by mouth three (3) times daily for 30 days. Max Daily Amount: 45 mg. Indications: Chronic Pain, Severe Pain Start taking on:  11/11/2017 NARCAN IJ  
by Injection route. ondansetron 4 mg disintegrating tablet Commonly known as:  ZOFRAN ODT Take 1-2 tablets every 6-8 hours as needed for nausea and vomiting. * varenicline 0.5 mg (11)- 1 mg (42) Dspk Commonly known as:  Chantix Take 1 mg by mouth two (2) times daily (after meals). Indications: SMOKING CESSATION  
  
 * varenicline 1 mg tablet Commonly known as:  Dennis Pride Take 1 Tab by mouth two (2) times a day. Take with meals * Notice: This list has 5 medication(s) that are the same as other medications prescribed for you. Read the directions carefully, and ask your doctor or other care provider to review them with you. Prescriptions Printed Refills  
 methadone (DOLOPHINE) 10 mg tablet 0 Sig: Take 1.5 Tabs by mouth three (3) times daily for 30 days. Max Daily Amount: 45 mg. Indications: Chronic Pain, Severe Pain Class: Print Route: Oral  
 methadone (DOLOPHINE) 10 mg tablet 0 Starting on: 11/11/2017 Sig: Take 1.5 Tabs by mouth three (3) times daily for 30 days. Max Daily Amount: 45 mg. Indications: Chronic Pain, Severe Pain Class: Print Route: Oral  
 methadone (DOLOPHINE) 10 mg tablet 0 Starting on: 11/11/2017 Sig: Take 1.5 Tabs by mouth three (3) times daily for 30 days. Max Daily Amount: 45 mg. Indications: Chronic Pain, Severe Pain Class: Print Route: Oral  
 HYDROmorphone (DILAUDID) 8 mg tablet 0 Starting on: 12/10/2017  Sig: Take 1 Tab by mouth three (3) times daily as needed for Pain for up to 30 days. Max Daily Amount: 24 mg. For breakthrough pain. Indications: Pain Class: Print Route: Oral  
  
Prescriptions Sent to Pharmacy Refills  
 doxepin (SINEQUAN) 25 mg capsule 2 Sig: Take 1 Cap by mouth nightly. For chronic insomnia Class: Normal  
 Pharmacy: [facility] [address] [place] [address] Ph #: [phone] Route: Oral  
 DULoxetine (CYMBALTA) 60 mg capsule 2 Sig: Take 1 Cap by mouth daily. Class: Normal  
 Pharmacy: [facility] [address] [place] [address] Ph #: [phone] Route: Oral  
 ondansetron (ZOFRAN ODT) 4 mg disintegrating tablet 0 Sig: Take 1-2 tablets every 6-8 hours as needed for nausea and vomiting. Class: Normal  
 Pharmacy: [facility] [address] [place] [address] Ph #: [phone]  
 gabapentin (NEURONTIN) 600 mg tablet 2 Sig: Take 1 Tab by mouth three (3) times daily. For neuropathic pain  
 Class: Normal  
 Pharmacy: [address] [place] [address] Ph #: [phone] Route: Oral  
 baclofen (LIORESAL) 10 mg tablet 5 Sig: Take 1/2 to 1 tab po up to three times per day for muscle spasms Class: Normal  
 Pharmacy: [facility] [address] [place] [address] Ph #: [phone] We Performed the Following AMB POC DRUG SCREEN () [ [facility]] DRUG SCREEN [[id number] Custom] Follow-up Instructions Return in about 3 months (around 1/12/2018). Introducing [facility]! Dear Wing Booth: Thank you for requesting a [email or web address] account. Our records indicate that you already have an active [email or web address] account. You can access your account anytime at [email or web address] Did you know that you can access your hospital and ER discharge instructions at any time in [email or web address]? You can also review all of your test results from your hospital stay or ER visit. Additional Information If you have questions, please visit the Frequently Asked Questions section of the F[employer] Experience[employer] website at [email or web address] Remember, [employer] is NOT to be used for urgent needs. For medical emergencies, dial 911. Now available from your iPhone and Android! Please provide this summary of care documentation to your next provider. Your primary care clinician is listed as Reyes Manley. If you have any questions after today's visit, please call [phone].

## 2017-10-12 NOTE — PROGRESS NOTES
HISTORY OF PRESENT ILLNESS  Megan Vidal is a 38 y.o. female. HPI Comments: Visit survey reviewed  Additional time was spent today reviewing progress notes, reviewing medications, diagnoses and past treatments. The patient is usually seen by Dr. Marylou Brown and his physician assistant. The patient is accompanied by her mother today. I have answered their questions to the best of my ability. Chief complaint abdominal pain secondary to interstitial cystitis. Patient has tried different medications including different opioids. She is happy with the current regimen. I have explained my recommendations in detail with the patient today. Our clinic will continue the gabapentin, baclofen, doxepin, Cymbalta, methadone. One more prescription for Zofran but moving forward I have encouraged her to have this prescribed by her primary care physician. I have explained, and I recommend, slight decrease of the Dilaudid. Instead of 4 times a day as needed, down to 3 times a day as needed. I have encouraged the patient to continue to follow-up with her urologist.  The patient is aware that Dr. Marylou Brown and his physician assistant are no longer with this practice. She denies any cardiac issues that might be from the methadone. She wants to continue with the methadone at current dose. The patient has received educational material today on opioids and chronic pain. She did tell me that there are some days she only uses 2 or 3 tablets of the Dilaudid. She does not use 4 tablets every day. Review of Systems   Constitutional: Positive for malaise/fatigue. HENT: Negative for ear discharge and ear pain. Eyes: Negative for discharge and redness. Respiratory: Negative for hemoptysis and wheezing. Cardiovascular: Negative for leg swelling and PND. Gastrointestinal: Positive for abdominal pain (cramping) and constipation. Genitourinary: Positive for dysuria and hematuria.         H/o IC   Musculoskeletal: Positive for back pain, joint pain, myalgias and neck pain. Skin: Negative for itching and rash. Neurological: Positive for weakness (generalized). Negative for seizures and loss of consciousness. Psychiatric/Behavioral: Negative for substance abuse and suicidal ideas. The patient has insomnia. All other systems reviewed and are negative. Physical Exam   Constitutional: She appears well-developed and well-nourished. She is cooperative. She does not have a sickly appearance. HENT:   Head: Normocephalic and atraumatic. Right Ear: External ear normal. No drainage. Left Ear: External ear normal. No drainage. Nose: Nose normal.   Eyes: Lids are normal. Right eye exhibits no discharge. Left eye exhibits no discharge. Right conjunctiva has no hemorrhage. Left conjunctiva has no hemorrhage. Neck: Neck supple. No tracheal deviation present. No thyroid mass present. Pulmonary/Chest: Effort normal. No respiratory distress. Neurological: She is alert. No cranial nerve deficit. Skin: Skin is intact. No rash noted. Psychiatric: Her speech is normal. Her affect is not angry. She does not express inappropriate judgment. Nursing note and vitals reviewed. ASSESSMENT and PLAN  Encounter Diagnoses   Name Primary?  Generalized abdominal pain Yes    Encounter for long-term (current) use of high-risk medication     Interstitial cystitis     Insomnia secondary to chronic pain     Neuropathic pain     Chronic pain syndrome    Follow-up 3 months  Medications, changes, as reviewed above  Prescription monitoring program reviewed,  --Urine test or oral swab today. Also, the prescription monitoring program was reviewed today. The majority of today's visit was spent counseling and coordinating care. Total visit time-40 minutes. -Dragon medical dictation software was used for portions of this report. Unintended errors may occur.

## 2017-10-12 NOTE — PROGRESS NOTES
Nursing Notes    Patient presents to the office today in follow-up. Patient rates her pain at 5/10 on the numerical pain scale. Reviewed medications with counts as follows:    Rx Date filled Qty Dispensed Pill Count Last Dose Short   Methadone 10mg 09/20/17 135 39 This am  No    Dilaudid 8mg 09/20/17 120  41 This am  No                                   Comments: Patient given educational material regarding opioids and chronic pain. Patient spoke with practice manager regarding patient's transition of care from provider [facility]) to provider () due to former leaving practice. POC UDS was performed in office today    Any new labs or imaging since last appointment? NO    Have you been to an emergency room (ER) or urgent care clinic since your last visit? NO            Have you been hospitalized since your last visit? NO     If yes, where, when, and reason for visit? Have you seen or consulted any other health care providers outside of the [address] [place] [address]  since your last visit? NO     If yes, where, when, and reason for visit? HM deferred to pcp.

## 2017-10-13 LAB
ALCOHOL UR POC: NORMAL
AMPHETAMINES UR POC: NEGATIVE
BARBITURATES UR POC: NEGATIVE
BENZODIAZEPINES UR POC: NEGATIVE
BUPRENORPHINE UR POC: NORMAL
CANNABINOIDS UR POC: NEGATIVE
CARISOPRODOL UR POC: NORMAL
COCAINE UR POC: NEGATIVE
FENTANYL UR POC: NORMAL
MDMA/ECSTASY UR POC: NEGATIVE
METHADONE UR POC: NORMAL
METHAMPHETAMINE UR POC: NEGATIVE
METHYLPHENIDATE UR POC: NEGATIVE
OPIATES UR POC: NORMAL
OXYCODONE UR POC: NEGATIVE
PHENCYCLIDINE UR POC: NORMAL
PROPOXYPHENE UR POC: NORMAL
TRAMADOL UR POC: NORMAL
TRICYCLICS UR POC: NEGATIVE

## 2017-10-20 ENCOUNTER — TELEPHONE (OUTPATIENT)
Dept: PAIN MANAGEMENT | Age: 42
End: 2017-10-20

## 2017-10-20 NOTE — TELEPHONE ENCOUNTER
Returned pt's call - explained pa for medication was submitted this am. Pt asked what if it is approved after I leave for the day. Told her she can ask the pharmacy to run the script and if approved it will go through. Pt understood.

## 2017-10-20 NOTE — TELEPHONE ENCOUNTER
Ivonne Olszewski from [place] [employer] called re pa for methadone. Asked a couple additional questions - problem was lapse in coverage, but since pt has been on methadone they will approve it for 6 months. Ivonne Olszewski said we may have to do another pa the 1st of Nov as the pt is going to a managed care plan. Called pt explained this to her. Pt understood. Called pharmacy and told them insurance said to run the claim in about 10 min and it will go through.

## 2017-10-20 NOTE — TELEPHONE ENCOUNTER
Called pt to let her know hydromorphone was approved. Pt said she was on the phone with insurance - was told it was approved. Told her have not heard about methadone. Pt said insurance person was checking on methadone. Asked if methadone was submitted. Told pt yes it was submitted. Both pas were faxed at the same time.

## 2018-01-09 ENCOUNTER — OFFICE VISIT (OUTPATIENT)
Dept: PAIN MANAGEMENT | Age: 43
End: 2018-01-09

## 2018-01-09 VITALS
SYSTOLIC BLOOD PRESSURE: 124 MMHG | HEART RATE: 111 BPM | TEMPERATURE: 99.5 F | DIASTOLIC BLOOD PRESSURE: 84 MMHG | BODY MASS INDEX: 30.33 KG/M2 | RESPIRATION RATE: 22 BRPM | WEIGHT: 160.5 LBS

## 2018-01-09 DIAGNOSIS — M25.50 PAIN IN JOINT, MULTIPLE SITES: ICD-10-CM

## 2018-01-09 DIAGNOSIS — R10.84 GENERALIZED ABDOMINAL PAIN: Primary | ICD-10-CM

## 2018-01-09 DIAGNOSIS — N30.10 INTERSTITIAL CYSTITIS: ICD-10-CM

## 2018-01-09 DIAGNOSIS — M79.2 NEUROPATHIC PAIN: ICD-10-CM

## 2018-01-09 DIAGNOSIS — G89.4 CHRONIC PAIN SYNDROME: ICD-10-CM

## 2018-01-09 RX ORDER — HYDROMORPHONE HYDROCHLORIDE 8 MG/1
8 TABLET ORAL
Qty: 90 TAB | Refills: 0 | Status: SHIPPED | OUTPATIENT
Start: 2018-01-09 | End: 2018-01-09 | Stop reason: SDUPTHER

## 2018-01-09 RX ORDER — METHADONE HYDROCHLORIDE 10 MG/1
15 TABLET ORAL 3 TIMES DAILY
Qty: 135 TAB | Refills: 0 | Status: SHIPPED | OUTPATIENT
Start: 2018-01-09 | End: 2018-01-09 | Stop reason: SDUPTHER

## 2018-01-09 RX ORDER — METHADONE HYDROCHLORIDE 10 MG/1
15 TABLET ORAL 3 TIMES DAILY
Qty: 135 TAB | Refills: 0 | Status: SHIPPED | OUTPATIENT
Start: 2018-02-08 | End: 2018-01-09 | Stop reason: SDUPTHER

## 2018-01-09 RX ORDER — METHADONE HYDROCHLORIDE 10 MG/1
15 TABLET ORAL 3 TIMES DAILY
Qty: 135 TAB | Refills: 0 | Status: SHIPPED | OUTPATIENT
Start: 2018-03-07 | End: 2018-04-06

## 2018-01-09 RX ORDER — GABAPENTIN 600 MG/1
600 TABLET ORAL 3 TIMES DAILY
Qty: 90 TAB | Refills: 2 | Status: SHIPPED | OUTPATIENT
Start: 2018-01-09 | End: 2018-07-31 | Stop reason: CLARIF

## 2018-01-09 RX ORDER — DULOXETIN HYDROCHLORIDE 60 MG/1
60 CAPSULE, DELAYED RELEASE ORAL DAILY
Qty: 30 CAP | Refills: 2 | Status: SHIPPED | OUTPATIENT
Start: 2018-01-09 | End: 2018-07-03 | Stop reason: SDUPTHER

## 2018-01-09 RX ORDER — HYDROMORPHONE HYDROCHLORIDE 8 MG/1
8 TABLET ORAL
Qty: 90 TAB | Refills: 0 | Status: SHIPPED | OUTPATIENT
Start: 2018-03-07 | End: 2018-04-06

## 2018-01-09 RX ORDER — BACLOFEN 10 MG/1
TABLET ORAL
Qty: 90 TAB | Refills: 3 | Status: SHIPPED | OUTPATIENT
Start: 2018-01-09 | End: 2018-07-03 | Stop reason: SDUPTHER

## 2018-01-09 RX ORDER — HYDROMORPHONE HYDROCHLORIDE 8 MG/1
8 TABLET ORAL
Qty: 90 TAB | Refills: 0 | Status: SHIPPED | OUTPATIENT
Start: 2018-02-08 | End: 2018-01-09 | Stop reason: SDUPTHER

## 2018-01-09 NOTE — MR AVS SNAPSHOT
Visit Information Date & Time Provider Department Dept. Phone Encounter #  
 1/9/2018  9:30 AM Branda Bamberger, MD [address] [place] [address] for Pain Management [phone] Follow-up Instructions Return in about 3 months (around 4/9/2018). Upcoming Health Maintenance Date Due Pneumococcal 19-64 Medium Risk (1 of 1 - PPSV23) 2/16/1996 DTaP/Tdap/Td series (1 - Tdap) 2/16/1998 PAP AKA CERVICAL CYTOLOGY 2/16/1998 Influenza Age 5 to Adult 8/1/2017 Allergies as of 1/9/2018  Review Complete On: 1/9/2018 By: Branda Bamberger, MD  
  
 Severity Noted Reaction Type Reactions Augmentin [Amoxicillin-pot Clavulanate] High 05/19/2013    Anaphylaxis Amoxicillin  04/04/2013   Topical Hives, Itching Itching of throat Ciprofloxacin  04/04/2013   Topical Other (comments) Redness of arm above IV site Darvocet A500 [Propoxyphene N-acetaminophen]  04/04/2013   Topical Hives Macrobid [Nitrofurantoin Monohyd/m-cryst]  07/23/2013   Side Effect Hives Nubain [Nalbuphine]  04/04/2013   Topical Hives, Other (comments) Muscle spasms Phenergan [Promethazine]  11/09/2016    Other (comments) Makes me feel like Im on fire from the inside out and causes involuntary muscle spasms. Toradol [Ketorolac Tromethamine]  01/11/2016    Itching Vibramycin [Doxycycline Calcium]  04/04/2013   Topical Hives, Itching Itching of the throat Current Immunizations  Never Reviewed No immunizations on file. Not reviewed this visit You Were Diagnosed With   
  
 Codes Comments Generalized abdominal pain    -  Primary ICD-10-CM: R10.84 ICD-9-CM: 789.07 Interstitial cystitis     ICD-10-CM: N30.10 ICD-9-CM: 595.1 Neuropathic pain     ICD-10-CM: M79.2 ICD-9-CM: 729.2 Chronic pain syndrome     ICD-10-CM: G89.4 ICD-9-CM: 338.4 Pain in joint, multiple sites     ICD-10-CM: M25.50 ICD-9-CM: 719.49 Vitals BP Pulse Temp Resp Weight(growth percentile) BMI  
 124/84 (!) 111 99.5 °F (37.5 °C) 22 160 lb 8 oz (72.8 kg) 30.33 kg/m2 OB Status Smoking Status Hysterectomy Current Every Day Smoker Vitals History BMI and BSA Data Body Mass Index Body Surface Area  
 30.33 kg/m 2 1.77 m 2 [facility] [phone] [place] [address] [phone] Your Updated Medication List  
  
   
This list is accurate as of: 1/9/18 10:33 AM.  Always use your most recent med list.  
  
  
  
  
 baclofen 10 mg tablet Commonly known as:  LIORESAL Take 1/2 to 1 tab po up to three times per day for muscle spasms CeleBREX 50 mg capsule Generic drug:  Celecoxib Take 50 mg by mouth two (2) times a day. doxepin 25 mg capsule Commonly known as:  SINEquan Take 1 Cap by mouth nightly. For chronic insomnia DULoxetine 60 mg capsule Commonly known as:  CYMBALTA Take 1 Cap by mouth daily. EPINEPHrine 0.3 mg/0.3 mL (1:1,000) injection Commonly known as:  AUVI-Q  
0.3 mL by IntraMUSCular route once as needed for Other (severe allergic reaction ) for 1 dose. gabapentin 600 mg tablet Commonly known as:  NEURONTIN Take 1 Tab by mouth three (3) times daily. For neuropathic pain HYDROmorphone 8 mg tablet Commonly known as:  DILAUDID Take 1 Tab by mouth three (3) times daily as needed for Pain for up to 30 days. Max Daily Amount: 24 mg. For breakthrough pain. Indications: Pain  
  
 methadone 10 mg tablet Commonly known as:  DOLOPHINE Take 1.5 Tabs by mouth three (3) times daily for 30 days. Max Daily Amount: 45 mg. Indications: Chronic Pain, Severe Pain NARCAN IJ  
by Injection route. ondansetron 4 mg disintegrating tablet Commonly known as:  ZOFRAN ODT Take 1-2 tablets every 6-8 hours as needed for nausea and vomiting. * varenicline 0.5 mg (11)- 1 mg (42) Dspk Commonly known as:  Chantix Take 1 mg by mouth two (2) times daily (after meals). Indications: SMOKING CESSATION  
  
 * varenicline 1 mg tablet Commonly known as:  Davide Said Take 1 Tab by mouth two (2) times a day. Take with meals * Notice: This list has 2 medication(s) that are the same as other medications prescribed for you. Read the directions carefully, and ask your doctor or other care provider to review them with you. Prescriptions Printed Refills HYDROmorphone (DILAUDID) 8 mg tablet 0 Sig: Take 1 Tab by mouth three (3) times daily as needed for Pain for up to 30 days. Max Daily Amount: 24 mg. For breakthrough pain. Indications: Pain Class: Print Route: Oral  
 methadone (DOLOPHINE) 10 mg tablet 0 Sig: Take 1.5 Tabs by mouth three (3) times daily for 30 days. Max Daily Amount: 45 mg. Indications: Chronic Pain, Severe Pain Class: Print Route: Oral  
  
Prescriptions Sent to Pharmacy Refills DULoxetine (CYMBALTA) 60 mg capsule 2 Sig: Take 1 Cap by mouth daily. Class: Normal  
 Pharmacy: [facility] [address] [address] [place] [address] Ph #: [phone] Route: Oral  
 gabapentin (NEURONTIN) 600 mg tablet 2 Sig: Take 1 Tab by mouth three (3) times daily. For neuropathic pain  
 Class: Normal  
 Pharmacy: [address] [place] [address] Ph #: [phone] Route: Oral  
 baclofen (LIORESAL) 10 mg tablet 3 Sig: Take 1/2 to 1 tab po up to three times per day for muscle spasms Class: Normal  
 Pharmacy: [facility] [address] [place] [address] Ph #: [phone] Follow-up Instructions Return in about 3 months (around 4/9/2018). Introducing [facility]! Dear Kevan Pitts: Thank you for requesting a PerformLine account. Our records indicate that you already have an active PerformLine account. You can access your account anytime at [email or web address] Did you know that you can access your hospital and ER discharge instructions at any time in [employer]? You can also review all of your test results from your hospital stay or ER visit. Additional Information If you have questions, please visit the Frequently Asked Questions section of the [employer] website at [email or web address] Remember, [employer] is NOT to be used for urgent needs. For medical emergencies, dial 911. Now available from your iPhone and Android! Please provide this summary of care documentation to your next provider. Your primary care clinician is listed as Elsie Bertrand. If you have any questions after today's visit, please call [phone].

## 2018-01-09 NOTE — PROGRESS NOTES
Nursing Notes    Patient presents to the office today in follow-up. Patient rates her pain at 5/10 on the numerical pain scale. Reviewed medications with counts as follows:    Rx Date filled Qty Dispensed Pill Count Last Dose Short     Dilaudid 8mg 12/19/17 90 29 This am  No    Methadone 10mg 12/19/17 135 43.5 This am  No                                     Comments:     POC UDS was not performed in office today    Any new labs or imaging since last appointment? NO    Have you been to an emergency room (ER) or urgent care clinic since your last visit? NO            Have you been hospitalized since your last visit? NO     If yes, where, when, and reason for visit? Have you seen or consulted any other health care providers outside of the [address] [place] [address]  since your last visit? NO     If yes, where, when, and reason for visit? HM deferred to pcp.

## 2018-01-09 NOTE — PROGRESS NOTES
HISTORY OF PRESENT ILLNESS  Delfina Perez is a 38 y.o. female. HPI Comments: Visit survey reviewed  Chief complaint- chronic pain syndrome- abdominal pain, back pain  The patient will continue with methadone 15 mg 3 times a day  Dilaudid 8 mg 3 times a day as needed  Gabapentin 600 mg 3 times a day  Baclofen 10 mg 3 times a day as needed  Cymbalta 60 mg once a day  At her previous visit, I had explained to the patient that I was not comfortable continuing her prescriptions for Chantix or doxepin. I believe that prescriptions for these medications or similar medications should come from her primary care physician or a sleep clinic or mental health clinic. I reminded the patient of this again today. She states she has been tapering down the Chantix because it is causing constipation and weight gain. She reports she is on this to help her quit smoking. She states she is on the doxepin to help with sleep. Difficulty with sleep as a chronic problem for her. I suspect that it is adding to a polypharmacy situation. I also suspect that difficulty sleeping is more related to anxiety or other issues rather than her chronic pain. She is already on methadone which should help to decrease pain levels throughout the night. I suggested she speak to her primary care physician, or sleep clinic or mental health clinic on whether or not they would recommend a prescription medicine to help her with sleep. I believe that on the long run, she would probably be better without yet another prescription medication.  -The visit survey indicates that medications help general activity, mood, walking, sleep, enjoyment of life. The patient will continue medications. No new significant side effects reported  Medication continues to help improve quality of life. Medications reviewed including risk and benefits.     Recent average level of pain-5    Measurement of clinical outcome for chronic pain patient/ SPAASMS Score Card- Information reviewed and will be scanned. Total score today-12      Review of Systems   Constitutional: Positive for malaise/fatigue. HENT: Negative for ear discharge and ear pain. Eyes: Negative for discharge and redness. Respiratory: Negative for hemoptysis and wheezing. Cardiovascular: Negative for leg swelling and PND. Gastrointestinal: Positive for abdominal pain (cramping) and constipation. Genitourinary: Positive for dysuria and hematuria. H/o IC   Musculoskeletal: Positive for back pain, joint pain, myalgias and neck pain. Skin: Negative for itching and rash. Neurological: Positive for weakness (generalized). Negative for seizures and loss of consciousness. Psychiatric/Behavioral: Negative for substance abuse and suicidal ideas. The patient has insomnia. All other systems reviewed and are negative. Physical Exam   Constitutional: She appears well-developed and well-nourished. She is cooperative. She does not have a sickly appearance. HENT:   Head: Normocephalic and atraumatic. Right Ear: External ear normal. No drainage. Left Ear: External ear normal. No drainage. Nose: Nose normal.   Eyes: Lids are normal. Right eye exhibits no discharge. Left eye exhibits no discharge. Right conjunctiva has no hemorrhage. Left conjunctiva has no hemorrhage. Neck: Neck supple. No tracheal deviation present. No thyroid mass present. Pulmonary/Chest: Effort normal. No respiratory distress. Neurological: She is alert. No cranial nerve deficit. Skin: Skin is intact. No rash noted. Psychiatric: Her speech is normal. Her affect is not angry. She does not express inappropriate judgment. Nursing note and vitals reviewed. ASSESSMENT and PLAN  Encounter Diagnoses   Name Primary?     Generalized abdominal pain Yes    Interstitial cystitis     Neuropathic pain     Chronic pain syndrome     Pain in joint, multiple sites    No indicators for recent medication misuse.  reviewed. Pain Meds and Quality Of Life have been reviewed. Nonpharmacologic therapy and non-opioid pharmacologic therapy were considered. If opioid therapy is prescribed, this is only if the expected benefits are anticipated to outweigh risks. Possible changes to treatment plan considered. Support/education given as needed. Today-medications are as listed. .  Follow up -- 3 months.

## 2018-04-06 ENCOUNTER — OFFICE VISIT (OUTPATIENT)
Dept: PAIN MANAGEMENT | Age: 43
End: 2018-04-06

## 2018-04-06 VITALS
WEIGHT: 160 LBS | SYSTOLIC BLOOD PRESSURE: 121 MMHG | HEART RATE: 126 BPM | RESPIRATION RATE: 18 BRPM | TEMPERATURE: 98.6 F | BODY MASS INDEX: 30.21 KG/M2 | HEIGHT: 61 IN | DIASTOLIC BLOOD PRESSURE: 85 MMHG

## 2018-04-06 DIAGNOSIS — Z79.899 ENCOUNTER FOR LONG-TERM (CURRENT) USE OF HIGH-RISK MEDICATION: ICD-10-CM

## 2018-04-06 DIAGNOSIS — R10.84 GENERALIZED ABDOMINAL PAIN: ICD-10-CM

## 2018-04-06 DIAGNOSIS — M79.7 FIBROMYALGIA: ICD-10-CM

## 2018-04-06 DIAGNOSIS — G89.4 CHRONIC PAIN SYNDROME: ICD-10-CM

## 2018-04-06 DIAGNOSIS — M25.50 PAIN IN JOINT, MULTIPLE SITES: Primary | ICD-10-CM

## 2018-04-06 LAB
ALCOHOL UR POC: NORMAL
AMPHETAMINES UR POC: NEGATIVE
BARBITURATES UR POC: NORMAL
BENZODIAZEPINES UR POC: NEGATIVE
BUPRENORPHINE UR POC: NEGATIVE
CANNABINOIDS UR POC: NEGATIVE
CARISOPRODOL UR POC: NORMAL
COCAINE UR POC: NEGATIVE
FENTANYL UR POC: NORMAL
MDMA/ECSTASY UR POC: NORMAL
METHADONE UR POC: NEGATIVE
METHAMPHETAMINE UR POC: NORMAL
METHYLPHENIDATE UR POC: NORMAL
OPIATES UR POC: NEGATIVE
OXYCODONE UR POC: NEGATIVE
PHENCYCLIDINE UR POC: NORMAL
PROPOXYPHENE UR POC: NORMAL
TRAMADOL UR POC: NORMAL
TRICYCLICS UR POC: NORMAL

## 2018-04-06 RX ORDER — BACLOFEN 10 MG/1
TABLET ORAL
Qty: 90 TAB | Refills: 2 | Status: SHIPPED | OUTPATIENT
Start: 2018-05-17 | End: 2018-07-03 | Stop reason: SDUPTHER

## 2018-04-06 RX ORDER — HYDROMORPHONE HYDROCHLORIDE 8 MG/1
8 TABLET ORAL
Qty: 90 TAB | Refills: 0 | Status: SHIPPED | OUTPATIENT
Start: 2018-06-16 | End: 2018-07-03 | Stop reason: SDUPTHER

## 2018-04-06 RX ORDER — METHADONE HYDROCHLORIDE 10 MG/1
15 TABLET ORAL 3 TIMES DAILY
Qty: 135 TAB | Refills: 0 | Status: SHIPPED | OUTPATIENT
Start: 2018-06-16 | End: 2018-07-03 | Stop reason: SDUPTHER

## 2018-04-06 RX ORDER — HYDROMORPHONE HYDROCHLORIDE 8 MG/1
8 TABLET ORAL
Qty: 90 TAB | Refills: 0 | Status: SHIPPED | OUTPATIENT
Start: 2018-05-17 | End: 2018-06-16

## 2018-04-06 RX ORDER — METHADONE HYDROCHLORIDE 10 MG/1
15 TABLET ORAL 3 TIMES DAILY
Qty: 135 TAB | Refills: 0 | Status: SHIPPED | OUTPATIENT
Start: 2018-05-17 | End: 2018-06-16

## 2018-04-06 RX ORDER — HYDROMORPHONE HYDROCHLORIDE 8 MG/1
8 TABLET ORAL
Qty: 90 TAB | Refills: 0 | Status: SHIPPED | OUTPATIENT
Start: 2018-04-18 | End: 2018-05-18

## 2018-04-06 RX ORDER — DULOXETIN HYDROCHLORIDE 60 MG/1
60 CAPSULE, DELAYED RELEASE ORAL DAILY
Qty: 30 CAP | Refills: 2 | Status: SHIPPED | OUTPATIENT
Start: 2018-04-18 | End: 2018-07-03 | Stop reason: SDUPTHER

## 2018-04-06 RX ORDER — METHADONE HYDROCHLORIDE 10 MG/1
15 TABLET ORAL 3 TIMES DAILY
Qty: 135 TAB | Refills: 0 | Status: SHIPPED | OUTPATIENT
Start: 2018-04-18 | End: 2018-05-18

## 2018-04-06 RX ORDER — GABAPENTIN 300 MG/1
300 CAPSULE ORAL 3 TIMES DAILY
Qty: 90 CAP | Refills: 2 | Status: SHIPPED | OUTPATIENT
Start: 2018-05-17 | End: 2018-06-16

## 2018-04-06 NOTE — PROGRESS NOTES
Nursing Notes    Patient presents to the office today in follow-up. Patient rates her pain at 5/10 on the numerical pain scale. Reviewed medications with counts as follows:    Rx Date filled Qty Dispensed Pill Count Last Dose Short   Methadone 10 mg 03/19/18 135 57 today no   Dilaudid 8 mg 03/19/18 90 38 today no                                POC UDS was performed in office today    Any new labs or imaging since last appointment? YES, Lab and x-ray     Have you been to an emergency room (ER) or urgent care clinic since your last visit? YES , [facility] 76           Have you been hospitalized since your last visit? NO     If yes, where, when, and reason for visit? Have you seen or consulted any other health care providers outside of the [facility]  since your last visit? NO     If yes, where, when, and reason for visit? HM deferred to pcp.

## 2018-04-06 NOTE — PROGRESS NOTES
HISTORY OF PRESENT ILLNESS  Violeta Fuller is a 41 y.o. female    Ms. Urbina Hidden returns today for f/u of chronic joint pain and low back pain. This patient  also suffers from generalized abdominal pain and fibromyalgia. This is my first visit with this patient today. The patient denies any significant changes since last f/u. Patient reports that she was seen in the ED last week for a fall. Patient reports she went to emergency room because she fell on her right arm. Patient has bruising on upper arm. Patient reports that she \"faints\" occasionally. \"It doesn't happen on a regular basis\". She currently looking for a new PCP. We discussed her current condition and medications in detail today. She tolerates medications without side effects. Violeta Fuller reports no change in sleep or constipation. Patient uses a OTC stool softener for occasional constipation    Last EK/17  QT/QTc: 342/466    Current medication management consists of:methadone 15 mg 3 times a day, Dilaudid 8 mg 3 times a day as needed, Gabapentin 600 mg 3 times a day, Baclofen 10 mg 3 times a day as needed, Cymbalta 60 mg once a day  Medications are helping with pain control and quality of life. Her pain is 4-5/10 with medication and 9-10/10 without. Pt describes pain as aching, stabbing, and burning. Aggravating factors include standing, sitting, and walking. Relieved with rest, medication, and avoiding painful activities. The patient reports 40% relief with current medications. Current treatment is helping to improve general activity, mood, walking, sleep, enjoyment of life    Measuring clinical outcomes of chronic pain patients: score 10/28; the lower the number the better the outcome. Pain Meds and Quality Of Life have been reviewed. Nonpharmacologic therapy and non-opioid pharmacologic therapy were considered. If opioid therapy is prescribed, this is only if the expected benefits are anticipated to outweigh risks.      She is otherwise doing well with no other complaints today. She denies any adverse events including nausea, vomiting, dizziness, increased constipation, hallucinations, or seizures. The patient reports functional improvement and QOL with pain medication. Vitals:    18 0951   BP: 121/85   Pulse: (!) 126   Resp: 18   Temp: 98.6 °F (37 °C)   TempSrc: Oral   Weight: 72.6 kg (160 lb)   Height: 5' 1\" (1.549 m)   PainSc:   5   PainLoc: Abdomen         Allergies   Allergen Reactions    Augmentin [Amoxicillin-Pot Clavulanate] Anaphylaxis    Amoxicillin Hives and Itching     Itching of throat    Ciprofloxacin Other (comments)     Redness of arm above IV site    Darvocet A500 [Propoxyphene N-Acetaminophen] Hives    Macrobid [Nitrofurantoin Monohyd/M-Cryst] Hives    Nubain [Nalbuphine] Hives and Other (comments)     Muscle spasms    Phenergan [Promethazine] Other (comments)     Makes me feel like Im on fire from the inside out and causes involuntary muscle spasms.  Toradol [Ketorolac Tromethamine] Itching    Vibramycin [Doxycycline Calcium] Hives and Itching     Itching of the throat       Past Surgical History:   Procedure Laterality Date    HX APPENDECTOMY      HX  SECTION      HX HEENT      oral surgery wisdom teeth extractiion    HX HYSTERECTOMY      LAVH    HX PELVIC LAPAROSCOPY      x 5    HX UROLOGICAL      cysto and hydrodistention x 4    HX UROLOGICAL      Interstim implant    HX UROLOGICAL  2013    revision interstim       ROS   Constitutional: Positive for malaise/fatigue. HENT: Negative for ear discharge and ear pain. Eyes: Negative for discharge and redness. Respiratory: Negative for hemoptysis and wheezing. Cardiovascular: Negative for leg swelling and PND. Gastrointestinal: Positive for abdominal pain (cramping) and constipation. Genitourinary: Positive for dysuria and hematuria.         H/o IC   Musculoskeletal: Positive for back pain, joint pain, myalgias and neck pain. Skin: Negative for itching and rash. Neurological: Positive for weakness (generalized). Negative for seizures and loss of consciousness. Psychiatric/Behavioral: Negative for substance abuse and suicidal ideas. The patient has insomnia. All other systems reviewed and are negative.       Physical Exam   Constitutional: She appears well-developed and well-nourished. She is cooperative. She does not have a sickly appearance. HENT:   Head: Normocephalic and atraumatic. Right Ear: External ear normal. No drainage. Left Ear: External ear normal. No drainage. Nose: Nose normal.   Eyes: Lids are normal. Right eye exhibits no discharge. Left eye exhibits no discharge. Right conjunctiva has no hemorrhage. Left conjunctiva has no hemorrhage. Neck: Neck supple. No tracheal deviation present. No thyroid mass present. Pulmonary/Chest: Effort normal. No respiratory distress. Neurological: She is alert. No cranial nerve deficit. Skin: Skin is intact. No rash noted. Psychiatric: Her speech is normal. Her affect is not angry. She does not express inappropriate judgment. Nursing note and vitals reviewed. ASSESSMENT:    1. Pain in joint, multiple sites    2. Fibromyalgia    3. Encounter for long-term (current) use of high-risk medication    4. Chronic pain syndrome    5. Generalized abdominal pain          [place] Prescription Monitoring Program was reviewed which does not demonstrate aberrancies and/or inconsistencies with regard to the historical prescribing of controlled medications to this patient by other providers. Medications were brought to visit today. Pill count was appropriate. When possible, non-drug therapy for chronic pain should be used as a first-line treatment.  Physical therapy exercise regimens, chiropractic manipulation, meditation relaxation techniques, cognitive behavior therapy, acupuncture, yoga, [place] [employer],  transcutaneous electrical nerve stimulation (TENS), and application of moist heat can help alleviate pain . Explained that realistic expectations and goals with chronic pain management are to maximize function and minimize pain with the understanding that limitations will exist both in the extent of relief that she may achieve, as well as thresholds of mg strengths that we will not exceed. Our role is to help the patient better cope with chronic pain utilizng a multimodal approach. The patients condition and plan were discussed. All questions were answered. The patient agrees with the plan. PLAN / Pt Instructions:  1. Continue current plan with no evidence of addiction or diversion. 2. Stable on current medication without adverse events. 3. Refill hydromorphone 8 mg tablet, take 1 tablet 3 times daily as needed  4. Refill methadone 10 mg tablet, take 1.5 tabs by mouth 3 times daily  5. Refill gabapentin 300 mg capsule, take 1 capsule 3 times daily  6. We will decrease gabapentin 600 mg dose down to 300 mg dose to try to improve the dry mouth symptoms. 7. Refill Cymbalta 60 mg capsule, take 1 capsule by mouth daily  8. Refill baclofen 10 mg tablet, take 1/2-1 tab up to 3 times daily as needed for muscle spasms  9. Discussed risks of addiction, dependency, and opioid induced hyperalgesia. 10. Reviewed with patient benefits of home exercise, and lifestyle changes to assist the patient in self-management of symptoms. 11. Return to clinic in 3 months     Prescription monitoring program reviewed. POC UDS today. Pain medications prescribed with the objective of pain relief and improved physical and psychosocial function in this patient. DISPOSITION  · Counseled patient on proper use of prescribed medications. · Counseled patient about chronic medical conditions and their relationship to anxiety and depression and recommended mental health support as needed.   · Reviewed with patient self-help tools, home exercise, and lifestyle changes to assist the patient in self-management of symptoms. · Reviewed with patient the treatment plan, goals of treatment plan, and limitations of treatment plan, to include the potential for side effects from medications and procedures. If side effects occur, it is the responsibility of the patient to inform the clinic so that a change in the treatment plan can be made in a safe manner. The patient is advised that stopping prescribed medication may cause an increase in symptoms and possible medication withdrawal symptoms. The patient is informed an emergency room evaluation may be necessary if this occurs. Spent 25 minutes with patient today reviewing the treatment plan, goals of treatment plan, and limitations of the treatment plan, to include the potential for side effects from medications and procedures. More than 50% of the visit time was spent counseling the patient. Jennifer Gilbert PA-C 4/6/2018        Note: Please excuse any typographical errors. Voice recognition software was used for this note and may cause mistakes.

## 2018-05-18 ENCOUNTER — TELEPHONE (OUTPATIENT)
Dept: PAIN MANAGEMENT | Age: 43
End: 2018-05-18

## 2018-05-21 ENCOUNTER — TELEPHONE (OUTPATIENT)
Dept: PAIN MANAGEMENT | Age: 43
End: 2018-05-21

## 2018-07-03 ENCOUNTER — OFFICE VISIT (OUTPATIENT)
Dept: PAIN MANAGEMENT | Age: 43
End: 2018-07-03

## 2018-07-03 VITALS
DIASTOLIC BLOOD PRESSURE: 85 MMHG | HEART RATE: 110 BPM | RESPIRATION RATE: 18 BRPM | BODY MASS INDEX: 29.64 KG/M2 | SYSTOLIC BLOOD PRESSURE: 115 MMHG | TEMPERATURE: 99.3 F | HEIGHT: 61 IN | WEIGHT: 157 LBS

## 2018-07-03 DIAGNOSIS — M79.7 FIBROMYALGIA: ICD-10-CM

## 2018-07-03 DIAGNOSIS — M25.50 PAIN IN JOINT, MULTIPLE SITES: ICD-10-CM

## 2018-07-03 DIAGNOSIS — G89.4 CHRONIC PAIN SYNDROME: Primary | ICD-10-CM

## 2018-07-03 DIAGNOSIS — N30.10 INTERSTITIAL CYSTITIS: ICD-10-CM

## 2018-07-03 DIAGNOSIS — Z79.899 ENCOUNTER FOR LONG-TERM (CURRENT) USE OF HIGH-RISK MEDICATION: ICD-10-CM

## 2018-07-03 DIAGNOSIS — R10.84 GENERALIZED ABDOMINAL PAIN: ICD-10-CM

## 2018-07-03 LAB
ALCOHOL UR POC: NORMAL
AMPHETAMINES UR POC: NEGATIVE
BARBITURATES UR POC: NORMAL
BENZODIAZEPINES UR POC: NEGATIVE
BUPRENORPHINE UR POC: NEGATIVE
CANNABINOIDS UR POC: NEGATIVE
CARISOPRODOL UR POC: NORMAL
COCAINE UR POC: NEGATIVE
FENTANYL UR POC: NORMAL
MDMA/ECSTASY UR POC: NORMAL
METHADONE UR POC: NORMAL
METHAMPHETAMINE UR POC: NORMAL
METHYLPHENIDATE UR POC: NORMAL
OPIATES UR POC: NORMAL
OXYCODONE UR POC: NEGATIVE
PHENCYCLIDINE UR POC: NORMAL
PROPOXYPHENE UR POC: NORMAL
TRAMADOL UR POC: NORMAL
TRICYCLICS UR POC: NORMAL

## 2018-07-03 RX ORDER — BACLOFEN 10 MG/1
TABLET ORAL
Qty: 90 TAB | Refills: 3 | Status: SHIPPED | OUTPATIENT
Start: 2018-07-03 | End: 2018-09-17 | Stop reason: SDUPTHER

## 2018-07-03 RX ORDER — DULOXETIN HYDROCHLORIDE 60 MG/1
60 CAPSULE, DELAYED RELEASE ORAL 2 TIMES DAILY
Qty: 30 CAP | Refills: 2 | Status: SHIPPED | OUTPATIENT
Start: 2018-07-03 | End: 2018-07-31 | Stop reason: SDUPTHER

## 2018-07-03 RX ORDER — METHADONE HYDROCHLORIDE 10 MG/1
TABLET ORAL
Qty: 105 TAB | Refills: 0 | Status: SHIPPED | OUTPATIENT
Start: 2018-07-15 | End: 2018-07-31 | Stop reason: SDUPTHER

## 2018-07-03 RX ORDER — HYDROMORPHONE HYDROCHLORIDE 8 MG/1
8 TABLET ORAL
Qty: 90 TAB | Refills: 0 | Status: SHIPPED | OUTPATIENT
Start: 2018-07-15 | End: 2018-07-31 | Stop reason: SDUPTHER

## 2018-07-03 RX ORDER — DULOXETIN HYDROCHLORIDE 60 MG/1
60 CAPSULE, DELAYED RELEASE ORAL DAILY
Qty: 30 CAP | Refills: 2 | Status: SHIPPED | OUTPATIENT
Start: 2018-07-03 | End: 2018-07-03 | Stop reason: SDUPTHER

## 2018-07-03 NOTE — PROGRESS NOTES
Nursing Notes    Patient presents to the office today in follow-up. Patient rates her pain at 5/10 on the numerical pain scale. Reviewed medications with counts as follows:    Rx Date filled Qty Dispensed Pill Count Last Dose Short   Dilaudid 8 mg 06/16/18 90 51 This a.m no   Methadone 10 mg 06/16/18 135 66 This a.m no                                POC UDS was performed in office today    Any new labs or imaging since last appointment? NO    Have you been to an emergency room (ER) or urgent care clinic since your last visit? NO            Have you been hospitalized since your last visit? NO     If yes, where, when, and reason for visit? Have you seen or consulted any other health care providers outside of the [address] [place] [address]  since your last visit? NO     If yes, where, when, and reason for visit? Ms. Jason Sims has a reminder for a \"due or due soon\" health maintenance. I have asked that she contact her primary care provider for follow-up on this health maintenance. Patient stated that she does not have Narcan.     PHQ over the last two weeks 7/3/2018   PHQ Not Done Active Diagnosis of Depression or Bipolar Disorder   Little interest or pleasure in doing things -   Feeling down, depressed or hopeless -   Total Score PHQ 2 -

## 2018-07-03 NOTE — MR AVS SNAPSHOT
[address] [id number] 
[phone] Patient: Brian Ann MRN: [id number] :1975 Visit Information Date & Time Provider Department Dept. Phone Encounter #  
 7/3/2018  8:00 AM Chely Burdick, [address] [facility] [phone] [id number] Follow-up Instructions Return in about 30 days (around 2018) for 30 min. Upcoming Health Maintenance Date Due DTaP/Tdap/Td series (1 - Tdap) 1998 PAP AKA CERVICAL CYTOLOGY 1998 Influenza Age 5 to Adult 2018 Allergies as of 7/3/2018  Review Complete On: 7/3/2018 By: SAMMI Kent Severity Noted Reaction Type Reactions Augmentin [Amoxicillin-pot Clavulanate] High 2013    Anaphylaxis Amoxicillin  2013   Topical Hives, Itching Itching of throat Ciprofloxacin  2013   Topical Other (comments) Redness of arm above IV site Darvocet A500 [Propoxyphene N-acetaminophen]  2013   Topical Hives Macrobid [Nitrofurantoin Monohyd/m-cryst]  2013   Side Effect Hives Nubain [Nalbuphine]  2013   Topical Hives, Other (comments) Muscle spasms Phenergan [Promethazine]  2016    Other (comments) Makes me feel like Im on fire from the inside out and causes involuntary muscle spasms. Toradol [Ketorolac Tromethamine]  2016    Itching Vibramycin [Doxycycline Calcium]  2013   Topical Hives, Itching Itching of the throat Current Immunizations  Never Reviewed No immunizations on file. Not reviewed this visit You Were Diagnosed With   
  
 Codes Comments Chronic pain syndrome    -  Primary ICD-10-CM: G89.4 ICD-9-CM: 338.4 Encounter for long-term (current) use of high-risk medication     ICD-10-CM: Z79.899 ICD-9-CM: V58.69 Generalized abdominal pain     ICD-10-CM: R10.84 ICD-9-CM: 789.07   
 Pain in joint, multiple sites     ICD-10-CM: M25.50 ICD-9-CM: 719.49 Interstitial cystitis     ICD-10-CM: N30.10 ICD-9-CM: 595.1 Fibromyalgia     ICD-10-CM: M79.7 ICD-9-CM: 729.1 Vitals BP Pulse Temp Resp Height(growth percentile) Weight(growth percentile) 115/85 (BP 1 Location: Right arm, BP Patient Position: Sitting) (!) 110 99.3 °F (37.4 °C) (Oral) 18 5' 1\" (1.549 m) 157 lb (71.2 kg) BMI OB Status Smoking Status 29.66 kg/m2 Hysterectomy Former Smoker Vitals History BMI and BSA Data Body Mass Index Body Surface Area  
 29.66 kg/m 2 1.75 m 2 Preferred Pharmacy Pharmacy Name Phone [facility]. [address] [place] [address] [phone] Your Updated Medication List  
  
   
This list is accurate as of 7/3/18 10:02 AM.  Always use your most recent med list.  
  
  
  
  
 baclofen 10 mg tablet Commonly known as:  LIORESAL  
1 tab po up to three times per day for muscle spasms DULoxetine 60 mg capsule Commonly known as:  CYMBALTA Take 1 Cap by mouth two (2) times a day. EPINEPHrine 0.3 mg/0.3 mL (1:1,000) injection Commonly known as:  AUVI-Q  
0.3 mL by IntraMUSCular route once as needed for Other (severe allergic reaction ) for 1 dose. gabapentin 600 mg tablet Commonly known as:  NEURONTIN Take 1 Tab by mouth three (3) times daily. For neuropathic pain HYDROmorphone 8 mg tablet Commonly known as:  DILAUDID Take 1 Tab by mouth three (3) times daily as needed for Pain for up to 30 days. Max Daily Amount: 24 mg. For breakthrough pain. Indications: Pain Start taking on:  7/15/2018  
  
 methadone 10 mg tablet Commonly known as:  DOLOPHINE Take 1.5 tablets (15mg) in the morning. Take 1 tablet (10mg) in the afternoon. Take 1 tablet (10mg) in the evening. Maximum daily dose 35mg. Indications: Chronic Pain, Severe Pain Start taking on:  7/15/2018  Matthias Round  
 by Injection route. ondansetron 4 mg disintegrating tablet Commonly known as:  ZOFRAN ODT Take 1-2 tablets every 6-8 hours as needed for nausea and vomiting. Prescriptions Printed Refills HYDROmorphone (DILAUDID) 8 mg tablet 0 Starting on: 7/15/2018 Sig: Take 1 Tab by mouth three (3) times daily as needed for Pain for up to 30 days. Max Daily Amount: 24 mg. For breakthrough pain. Indications: Pain Class: Print Route: Oral  
 methadone (DOLOPHINE) 10 mg tablet 0 Starting on: 7/15/2018 Sig: Take 1.5 tablets (15mg) in the morning. Take 1 tablet (10mg) in the afternoon. Take 1 tablet (10mg) in the evening. Maximum daily dose 35mg. Indications: Chronic Pain, Severe Pain Class: Print Prescriptions Sent to Pharmacy Refills  
 baclofen (LIORESAL) 10 mg tablet 3 Si tab po up to three times per day for muscle spasms Class: Normal  
 Pharmacy: [id number] [address] [place] [address] Ph #: [phone] DULoxetine (CYMBALTA) 60 mg capsule 2 Sig: Take 1 Cap by mouth two (2) times a day. Class: Normal  
 Pharmacy: [id number] [address] Ph #: [phone] Route: Oral  
  
We Performed the Following AMB POC DRUG SCREEN () [ [facility]] DRUG SCREEN [[id number] Custom] REFERRAL TO PSYCHIATRY [REF91 Custom] Follow-up Instructions Return in about 30 days (around 2018) for 30 min. To-Do List   
 2018 ECG:  EKG, 12 LEAD, INITIAL Referral Information Referral ID Referred By Referred To  
  
 [id number] Burke Rosado Not Available Visits Status Start Date End Date 1 New Request 7/3/18 7/3/19 If your referral has a status of pending review or denied, additional information will be sent to support the outcome of this decision. Patient Instructions Safe Use of Opioid Pain Medicine: Care Instructions Your Care Instructions Pain is your body's way of warning you that something is wrong. Pain feels different for everybody. Only you can describe your pain. A doctor can suggest or prescribe many types of medicines for pain. These range from over-the-counter medicines like acetaminophen (Tylenol) to powerful medicines called opioids. Examples of opioids are fentanyl, hydrocodone, morphine, and oxycodone. Heroin is an illegal opioid Opioids are strong medicines. They can help you manage pain when you use them the right way. But if you misuse them, they can cause serious harm and even death. For these reasons, doctors are very careful about how they prescribe opioids. If you decide to take opioids, here are some things to remember. · Keep your doctor informed. You can get addicted to opioids. The risk is higher if you have a history of substance use. Your doctor will monitor you closely for signs of misuse and addiction and to figure out when you no longer need to take opioids. · Make a treatment plan. The goal of your plan is to be able to function and do the things you need to do, even if you still have some pain. You might be able to manage your pain with other non-opioid options like physical therapy, relaxation, or over-the-counter pain medicines. · Be aware of the side effects. Opioids can cause serious side effects, such as constipation, dry mouth, and nausea. And over time, you may need a higher dose to get pain relief. This is called tolerance. Your body also gets used to opioids. This is called physical dependence. If you suddenly stop taking them, you may have withdrawal symptoms. The doctor carefully considered what pain medicine is right for you. You may not have received opioids if your doctor was concerned about drug interactions or your safety, or if he or she had other concerns. It is best to have one doctor or clinic treat your pain.  This way you will get the pain medicine that will help you the most. And a doctor will be able to watch for any problems that the medicine might cause. The doctor has checked you carefully, but problems can develop later. If you notice any problems or new symptoms,  get medical treatment right away. Follow-up care is a key part of your treatment and safety. Be sure to make and go to all appointments, and call your doctor if you are having problems. It's also a good idea to know your test results and keep a list of the medicines you take. How can you care for yourself at home? · If you need to take opioids to manage your pain, remember these safety tips. ¨ Follow directions carefully. It's easy to misuse opioids if you take a dose other than what's prescribed by your doctor. This can lead to overdose and even death. Even sharing them with someone they weren't meant for is misuse. ¨ Be cautious. Opioids may affect your judgment and decision making. Do not drive or operate machinery until you can think clearly. Talk with your doctor about when it is safe to drive. ¨ Reduce the risk of drug interactions. Opioids can be dangerous if you take them with alcohol or with certain drugs like sleeping pills and muscle relaxers. Make sure your doctor knows about all the other medicines you take, including over-the-counter medicines. Don't start any new medicines before you talk to your doctor or pharmacist. 
Roberto Steen Keep others safe. Store opioids in a safe and secure place. Make sure that pets, children, friends, and family can't get to them. When you're done using opioids, make sure to properly dispose of them. You can either use a community drug take-back program or your drugstore's mail-back program. If one of these programs isn't available, you can flush opioid skin patches and unused opioid pills down the toilet. ¨ Reduce the risk of overdose. Misuse of opioids can be very dangerous. Protect yourself by asking your doctor about a naloxone rescue kit. It can help you-and even save your life-if you take too much of an opioid. · Try other ways to reduce pain. ¨ Relax, and reduce stress. Relaxation techniques such as deep breathing or meditation can help. ¨ Keep moving. Gentle, daily exercise can help reduce pain over the long run. Try low- or no-impact exercises such as walking, swimming, and stationary biking. Do stretches to stay flexible. ¨ Try heat, cold packs, and massage. ¨ Get enough sleep. Pain can make you tired and drain your energy. Talk with your doctor if you have trouble sleeping because of pain. ¨ Think positive. Your thoughts can affect your pain level. Do things that you enjoy to distract yourself when you have pain instead of focusing on the pain. See a movie, read a book, listen to music, or spend time with a friend. · If you are not taking a prescription pain medicine, ask your doctor if you can take an over-the-counter medicine. When should you call for help? Call your doctor now or seek immediate medical care if: 
? · You have a new kind of pain. ? · You have new symptoms, such as a fever or rash, along with the pain. ? Watch closely for changes in your health, and be sure to contact your doctor if: 
? · You think you might be using too much pain medicine, and you need help to use less or stop. ? · Your pain gets worse. ? · You would like a referral to a doctor or clinic that specializes in pain management. Where can you learn more? Go to http://santos-tuan.info/. Enter R108 in the search box to learn more about \"Safe Use of Opioid Pain Medicine: Care Instructions. \" Current as of: October 14, 2016 Content Version: 11.4 © 7333-3645 [employer]. Care instructions adapted under license by [employer] (which disclaims liability or warranty for this information).  If you have questions about a medical condition or this instruction, always ask your healthcare professional. [address] any warranty or liability for your use of this information. Learning About Sleeping Well What does sleeping well mean? Sleeping well means getting enough sleep. How much sleep is enough varies among people. The number of hours you sleep is not as important as how you feel when you wake up. If you do not feel refreshed, you probably need more sleep. Another sign of not getting enough sleep is feeling tired during the day. The average total nightly sleep time is 7½ to 8 hours. Healthy adults may need a little more or a little less than this. Why is getting enough sleep important? Getting enough quality sleep is a basic part of good health. When your sleep suffers, your mood and your thoughts can suffer too. You may find yourself feeling more grumpy or stressed. Not getting enough sleep also can lead to serious problems, including injury, accidents, anxiety, and depression. What might cause poor sleeping? Many things can cause sleep problems, including: · Stress. Stress can be caused by fear about a single event, such as giving a speech. Or you may have ongoing stress, such as worry about work or school. · Depression, anxiety, and other mental or emotional conditions. · Changes in your sleep habits or surroundings. This includes changes that happen where you sleep, such as noise, light, or sleeping in a different bed. It also includes changes in your sleep pattern, such as having jet lag or working a late shift. · Health problems, such as pain, breathing problems, and restless legs syndrome. · Lack of regular exercise. How can you help yourself? Here are some tips that may help you sleep more soundly and wake up feeling more refreshed. Your sleeping area · Use your bedroom only for sleeping and sex. A bit of light reading may help you fall asleep.  But if it doesn't, do your reading elsewhere in the house. Don't watch TV in bed. · Be sure your bed is big enough to stretch out comfortably, especially if you have a sleep partner. · Keep your bedroom quiet, dark, and cool. Use curtains, blinds, or a sleep mask to block out light. To block out noise, use earplugs, soothing music, or a \"white noise\" machine. Your evening and bedtime routine · Create a relaxing bedtime routine. You might want to take a warm shower or bath, listen to soothing music, or drink a cup of noncaffeinated tea. · Go to bed at the same time every night. And get up at the same time every morning, even if you feel tired. What to avoid · Limit caffeine (coffee, tea, caffeinated sodas) during the day, and don't have any for at least 4 to 6 hours before bedtime. · Don't drink alcohol before bedtime. Alcohol can cause you to wake up more often during the night. · Don't smoke or use tobacco, especially in the evening. Nicotine can keep you awake. · Don't take naps during the day, especially close to bedtime. · Don't lie in bed awake for too long. If you can't fall asleep, or if you wake up in the middle of the night and can't get back to sleep within 15 minutes or so, get out of bed and go to another room until you feel sleepy. · Don't take medicine right before bed that may keep you awake or make you feel hyper or energized. Your doctor can tell you if your medicine may do this and if you can take it earlier in the day. If you can't sleep · Imagine yourself in a peaceful, pleasant scene. Focus on the details and feelings of being in a place that is relaxing. · Get up and do a quiet or boring activity until you feel sleepy. · Don't drink any liquids after 6 p.m. if you wake up often because you have to go to the bathroom. Where can you learn more? Go to http://santos-tuan.info/. Enter Z392 in the search box to learn more about \"Learning About Sleeping Well. \" Current as of: May 12, 2017 Content Version: 11.4 © 0665-3258 [employer]. Care instructions adapted under license by [employer] (which disclaims liability or warranty for this information). If you have questions about a medical condition or this instruction, always ask your healthcare professional. [address] any warranty or liability for your use of this information. Introducing [facility]! Dear Wiley Postal: Thank you for requesting a Highfive account. Our records indicate that you already have an active Highfive account. You can access your account anytime at [email or web address] Did you know that you can access your hospital and ER discharge instructions at any time in Highfive? You can also review all of your test results from your hospital stay or ER visit. Additional Information If you have questions, please visit the Frequently Asked Questions section of the Highfive website at [email or web address] Remember, Highfive is NOT to be used for urgent needs. For medical emergencies, dial 911. Now available from your iPhone and Android! Please provide this summary of care documentation to your next provider. Your primary care clinician is listed as Reyes Manley. If you have any questions after today's visit, please call [phone].

## 2018-07-03 NOTE — PATIENT INSTRUCTIONS
Safe Use of Opioid Pain Medicine: Care Instructions  Your Care Instructions  Pain is your body's way of warning you that something is wrong. Pain feels different for everybody. Only you can describe your pain. A doctor can suggest or prescribe many types of medicines for pain. These range from over-the-counter medicines like acetaminophen (Tylenol) to powerful medicines called opioids. Examples of opioids are fentanyl, hydrocodone, morphine, and oxycodone. Heroin is an illegal opioid  Opioids are strong medicines. They can help you manage pain when you use them the right way. But if you misuse them, they can cause serious harm and even death. For these reasons, doctors are very careful about how they prescribe opioids. If you decide to take opioids, here are some things to remember. · Keep your doctor informed. You can get addicted to opioids. The risk is higher if you have a history of substance use. Your doctor will monitor you closely for signs of misuse and addiction and to figure out when you no longer need to take opioids. · Make a treatment plan. The goal of your plan is to be able to function and do the things you need to do, even if you still have some pain. You might be able to manage your pain with other non-opioid options like physical therapy, relaxation, or over-the-counter pain medicines. · Be aware of the side effects. Opioids can cause serious side effects, such as constipation, dry mouth, and nausea. And over time, you may need a higher dose to get pain relief. This is called tolerance. Your body also gets used to opioids. This is called physical dependence. If you suddenly stop taking them, you may have withdrawal symptoms. The doctor carefully considered what pain medicine is right for you. You may not have received opioids if your doctor was concerned about drug interactions or your safety, or if he or she had other concerns. It is best to have one doctor or clinic treat your pain. This way you will get the pain medicine that will help you the most. And a doctor will be able to watch for any problems that the medicine might cause. The doctor has checked you carefully, but problems can develop later. If you notice any problems or new symptoms,  get medical treatment right away. Follow-up care is a key part of your treatment and safety. Be sure to make and go to all appointments, and call your doctor if you are having problems. It's also a good idea to know your test results and keep a list of the medicines you take. How can you care for yourself at home? · If you need to take opioids to manage your pain, remember these safety tips. ¨ Follow directions carefully. It's easy to misuse opioids if you take a dose other than what's prescribed by your doctor. This can lead to overdose and even death. Even sharing them with someone they weren't meant for is misuse. ¨ Be cautious. Opioids may affect your judgment and decision making. Do not drive or operate machinery until you can think clearly. Talk with your doctor about when it is safe to drive. ¨ Reduce the risk of drug interactions. Opioids can be dangerous if you take them with alcohol or with certain drugs like sleeping pills and muscle relaxers. Make sure your doctor knows about all the other medicines you take, including over-the-counter medicines. Don't start any new medicines before you talk to your doctor or pharmacist.  Christy Shaw Keep others safe. Store opioids in a safe and secure place. Make sure that pets, children, friends, and family can't get to them. When you're done using opioids, make sure to properly dispose of them. You can either use a community drug take-back program or your drugstore's mail-back program. If one of these programs isn't available, you can flush opioid skin patches and unused opioid pills down the toilet. ¨ Reduce the risk of overdose. Misuse of opioids can be very dangerous.  Protect yourself by asking your doctor about a naloxone rescue kit. It can help you-and even save your life-if you take too much of an opioid. · Try other ways to reduce pain. ¨ Relax, and reduce stress. Relaxation techniques such as deep breathing or meditation can help. ¨ Keep moving. Gentle, daily exercise can help reduce pain over the long run. Try low- or no-impact exercises such as walking, swimming, and stationary biking. Do stretches to stay flexible. ¨ Try heat, cold packs, and massage. ¨ Get enough sleep. Pain can make you tired and drain your energy. Talk with your doctor if you have trouble sleeping because of pain. ¨ Think positive. Your thoughts can affect your pain level. Do things that you enjoy to distract yourself when you have pain instead of focusing on the pain. See a movie, read a book, listen to music, or spend time with a friend. · If you are not taking a prescription pain medicine, ask your doctor if you can take an over-the-counter medicine. When should you call for help? Call your doctor now or seek immediate medical care if:  ? · You have a new kind of pain. ? · You have new symptoms, such as a fever or rash, along with the pain. ? Watch closely for changes in your health, and be sure to contact your doctor if:  ? · You think you might be using too much pain medicine, and you need help to use less or stop. ? · Your pain gets worse. ? · You would like a referral to a doctor or clinic that specializes in pain management. Where can you learn more? Go to http://santos-tuan.info/. Enter R108 in the search box to learn more about \"Safe Use of Opioid Pain Medicine: Care Instructions. \"  Current as of: October 14, 2016  Content Version: 11.4  © 6369-1362 [employer]. Care instructions adapted under license by [employer] (which disclaims liability or warranty for this information).  If you have questions about a medical condition or this instruction, always ask your healthcare professional. [address] any warranty or liability for your use of this information. Learning About Sleeping Well  What does sleeping well mean? Sleeping well means getting enough sleep. How much sleep is enough varies among people. The number of hours you sleep is not as important as how you feel when you wake up. If you do not feel refreshed, you probably need more sleep. Another sign of not getting enough sleep is feeling tired during the day. The average total nightly sleep time is 7½ to 8 hours. Healthy adults may need a little more or a little less than this. Why is getting enough sleep important? Getting enough quality sleep is a basic part of good health. When your sleep suffers, your mood and your thoughts can suffer too. You may find yourself feeling more grumpy or stressed. Not getting enough sleep also can lead to serious problems, including injury, accidents, anxiety, and depression. What might cause poor sleeping? Many things can cause sleep problems, including:  · Stress. Stress can be caused by fear about a single event, such as giving a speech. Or you may have ongoing stress, such as worry about work or school. · Depression, anxiety, and other mental or emotional conditions. · Changes in your sleep habits or surroundings. This includes changes that happen where you sleep, such as noise, light, or sleeping in a different bed. It also includes changes in your sleep pattern, such as having jet lag or working a late shift. · Health problems, such as pain, breathing problems, and restless legs syndrome. · Lack of regular exercise. How can you help yourself? Here are some tips that may help you sleep more soundly and wake up feeling more refreshed. Your sleeping area  · Use your bedroom only for sleeping and sex. A bit of light reading may help you fall asleep. But if it doesn't, do your reading elsewhere in the house. Don't watch TV in bed.   · Be sure your bed is big enough to stretch out comfortably, especially if you have a sleep partner. · Keep your bedroom quiet, dark, and cool. Use curtains, blinds, or a sleep mask to block out light. To block out noise, use earplugs, soothing music, or a \"white noise\" machine. Your evening and bedtime routine  · Create a relaxing bedtime routine. You might want to take a warm shower or bath, listen to soothing music, or drink a cup of noncaffeinated tea. · Go to bed at the same time every night. And get up at the same time every morning, even if you feel tired. What to avoid  · Limit caffeine (coffee, tea, caffeinated sodas) during the day, and don't have any for at least 4 to 6 hours before bedtime. · Don't drink alcohol before bedtime. Alcohol can cause you to wake up more often during the night. · Don't smoke or use tobacco, especially in the evening. Nicotine can keep you awake. · Don't take naps during the day, especially close to bedtime. · Don't lie in bed awake for too long. If you can't fall asleep, or if you wake up in the middle of the night and can't get back to sleep within 15 minutes or so, get out of bed and go to another room until you feel sleepy. · Don't take medicine right before bed that may keep you awake or make you feel hyper or energized. Your doctor can tell you if your medicine may do this and if you can take it earlier in the day. If you can't sleep  · Imagine yourself in a peaceful, pleasant scene. Focus on the details and feelings of being in a place that is relaxing. · Get up and do a quiet or boring activity until you feel sleepy. · Don't drink any liquids after 6 p.m. if you wake up often because you have to go to the bathroom. Where can you learn more? Go to http://santos-tuan.info/. Enter J727 in the search box to learn more about \"Learning About Sleeping Well. \"  Current as of:  May 12, 2017  Content Version: 11.4  © 8112-6357 [employer]. Care instructions adapted under license by "[employer] (which disclaims liability or warranty for this information). If you have questions about a medical condition or this instruction, always ask your healthcare professional. [address] any warranty or liability for your use of this information.

## 2018-07-03 NOTE — PROGRESS NOTES
Referral date before 2011, source PCP Dr Tonya Gomez and for chronic pelvic pain due to interstitial cystitis. HPI:  Danuta Stuart is a 41 y.o. female here for f/u visit for ongoing evaluation of chronic abdominal/pelvic pain due to interstitial cystitis and generalized pain due to fibromyalgia. She is s/p total hysterectomy due to endometriosis. She has had Elmiron, pelvic floor exercises, dilation and placement of inter-stem. Pt was last seen here on 4/6/2018. Pt denies interval changes on the character or distribution of pain. Pain is located to her lower abdomen/pelvic area and \"all over\". The pain is described as stabbing and aching with constant throbbing. Pain at its best is 4/10. Pain at its worse is 10/10. The pain is worsened by pelvic floor physical therapy in the past. Symptoms are relieved by methadone medication, dilaudid medication, Azo medication, baclofen medication, heating pad, cold compress and massage. Pt has tried motrin, tylenol, Amitriptyline medication and topical compound cream with no perceived benefit. Social History     Social History    Marital status:      Spouse name: N/A    Number of children: N/A    Years of education: N/A     Occupational History    Not on file.      Social History Main Topics    Smoking status: Former Smoker     Packs/day: 0.50     Years: 10.00    Smokeless tobacco: Never Used    Alcohol use Yes      Comment: 1 every 2 months    Drug use: No    Sexual activity: Not on file      Comment: hysterectomy     Other Topics Concern    Not on file     Social History Narrative     Family History   Problem Relation Age of Onset    Post-op Nausea/Vomiting Sister     Malignant Hyperthermia Neg Hx     Pseudocholinesterase Deficiency Neg Hx     Delayed Awakening Neg Hx     Emergence Delirium Neg Hx     Post-op Cognitive Dysfunction Neg Hx      Allergies   Allergen Reactions    Augmentin [Amoxicillin-Pot Clavulanate] Anaphylaxis    Amoxicillin Hives and Itching     Itching of throat    Ciprofloxacin Other (comments)     Redness of arm above IV site    Darvocet A500 [Propoxyphene N-Acetaminophen] Hives    Macrobid [Nitrofurantoin Monohyd/M-Cryst] Hives    Nubain [Nalbuphine] Hives and Other (comments)     Muscle spasms    Phenergan [Promethazine] Other (comments)     Makes me feel like Im on fire from the inside out and causes involuntary muscle spasms.  Toradol [Ketorolac Tromethamine] Itching    Vibramycin [Doxycycline Calcium] Hives and Itching     Itching of the throat     Past Medical History:   Diagnosis Date    Anxiety     Arthritis     osteopenia    Chronic kidney disease     kidney stones    Chronic pain     bladder    Depression     Fibromyalgia     Interstitial cystitis     Nausea & vomiting     Other ill-defined conditions(799.89)     chronic interstitual cyctitis    Other ill-defined conditions(799.89)     endometriosis    Psychiatric disorder      Past Surgical History:   Procedure Laterality Date    HX APPENDECTOMY      HX  SECTION      HX HEENT      oral surgery wisdom teeth extractiion    HX HYSTERECTOMY      LAVH    HX PELVIC LAPAROSCOPY      x 5    HX UROLOGICAL      cysto and hydrodistention x 4    HX UROLOGICAL      Interstim implant    HX UROLOGICAL  2013    revision interstim     Current Outpatient Prescriptions on File Prior to Visit   Medication Sig    gabapentin (NEURONTIN) 600 mg tablet Take 1 Tab by mouth three (3) times daily. For neuropathic pain (Patient taking differently: Take 300 mg by mouth three (3) times daily. For neuropathic pain)    NALOXONE HCL (NARCAN IJ) by Injection route.  EPINEPHrine (AUVI-Q) 0.3 mg/0.3 mL (1:1,000) injection 0.3 mL by IntraMUSCular route once as needed for Other (severe allergic reaction ) for 1 dose.  ondansetron (ZOFRAN ODT) 4 mg disintegrating tablet Take 1-2 tablets every 6-8 hours as needed for nausea and vomiting.      No current facility-administered medications on file prior to visit. Review of Systems   Constitutional: Positive for diaphoresis (not new) and malaise/fatigue. Negative for chills and fever. Respiratory: Negative for shortness of breath and wheezing. Cardiovascular: Negative for chest pain. Gastrointestinal: Positive for abdominal pain and constipation (resolved). Negative for blood in stool, diarrhea, nausea and vomiting. Genitourinary: Positive for dysuria, frequency and urgency. Negative for hematuria. Musculoskeletal: Negative for falls. Psychiatric/Behavioral: Positive for depression. Negative for substance abuse and suicidal ideas. The patient is nervous/anxious and has insomnia. Denies homicidal ideas. Opioid specific risk: depression, insomnia and anxiety. Opioid specific history: continuous opioid use for past 6 years. Vitals:    18 0842   BP: 115/85   Pulse: (!) 110   Resp: 18   Temp: 99.3 °F (37.4 °C)   TempSrc: Oral   Weight: 71.2 kg (157 lb)   Height: 5' 1\" (1.549 m)   PainSc:   5   PainLoc: Back       [id number]  QT/QTc: 342/466    PE:  Physical Exam    AFVSS despite mild tachycardia, no acute distress, normal body habitus. A&OXs 3. Normocephalic, atraumatic. Conjugate gaze, clear sclerae. Gait is within functional limits. Balance is within functional limits. Abdominal soft. Normal bowel sounds. No rigidity or rebound. Tenderness noted to RLQ, LLQ and suprapubic region. Non labored breathing. UE:   Strength for right upper extremity is 5/5 for shoulder abduction, elbow flexion, wrist extension, elbow extension, finger abduction, flexor digitorum profundus to digits 2 through 5. Strength for left upper extremity is 5/5 for shoulder abduction, elbow flexion, wrist extension, elbow extension, finger abduction, flexor digitorum profundus to digits 2 through 5. Sensation to light touch is intact for left C4-T1.   Sensation to light touch is intact for right C4-T1. LE:   Strength for right lower extremity is 5/5 for hip flexion, knee extension, dorsiflexion, extensor hallucis longus, plantar flexion. Strength for left lower extremity is 5/5 for hip flexion, knee extension, dorsiflexion, extensor hallucis longus, plantar flexion. Sensation to light touch is intact for right L2-S2. Sensation to light touch is intact for left L2-S2. Negative seated straight leg raise bilaterally. Calculated MEQ - 231  Naloxone rescue - yes  Prophylactic bowel program - no  Date of last OCA 1/9/2018  Last UDS today, consistent   date checked today, findings consistent    [address]  [address]  [phone]    Today  ORT - 3  TEE - 53%  PGIC - 5 and 3  COMM - 10    PHQ -- . PHQ over the last two weeks 7/3/2018   PHQ Not Done Active Diagnosis of Depression or Bipolar Disorder   Little interest or pleasure in doing things -   Feeling down, depressed or hopeless -   Total Score PHQ 2 -       DSM V-OUD Screen - defer to next visit     Assessment/Plan:     ICD-10-CM ICD-9-CM    1. Chronic pain syndrome G89.4 338.4 baclofen (LIORESAL) 10 mg tablet      HYDROmorphone (DILAUDID) 8 mg tablet      methadone (DOLOPHINE) 10 mg tablet      DULoxetine (CYMBALTA) 60 mg capsule      REFERRAL TO PSYCHIATRY      DISCONTINUED: DULoxetine (CYMBALTA) 60 mg capsule   2. Encounter for long-term (current) use of high-risk medication Z79.899 V58.69 DRUG SCREEN      AMB POC DRUG SCREEN ()      EKG, 12 LEAD, INITIAL   3. Generalized abdominal pain R10.84 789.07 baclofen (LIORESAL) 10 mg tablet      HYDROmorphone (DILAUDID) 8 mg tablet      methadone (DOLOPHINE) 10 mg tablet      DULoxetine (CYMBALTA) 60 mg capsule      DISCONTINUED: DULoxetine (CYMBALTA) 60 mg capsule   4. Pain in joint, multiple sites M25.50 719.49 HYDROmorphone (DILAUDID) 8 mg tablet      methadone (DOLOPHINE) 10 mg tablet   5.  Interstitial cystitis N30.10 595.1 6. Fibromyalgia M79.7 729.1 HYDROmorphone (DILAUDID) 8 mg tablet      methadone (DOLOPHINE) 10 mg tablet      REFERRAL TO PSYCHIATRY      Pt currently taking methadone 10 mg-take 1.5 tablets by mouth 3 times daily and Hydromorphone 8 mg 3 times a day as needed for pain; her MME is 231. Today we will start the weaning of her opioid medication with the goal of being opioid free. Pt will continue methadone 10 mg tablets but instructed to take 1.5 tablets (15 mg) on the morning, 1 tablet (10 mg) in the afternoon and 1 tablet (10 mg) in the evening. Her hydromorphone 8 mg 3 times a day as needed for pain will remain the same. Her new MME is 201. She has difficulty with the wheezing or difficulty with cravings we will consider referral to mental health for ongoing assessment and treatment for opioid use disorder. At her next visit we will decrease methadone 10 mg to 3 times a day and keep her hydromorphone the same; pending safety compliance her new MME will be 186. Continue Neurontin as previously prescribed. Will increase Cymbalta to BID for her pain. Baclofen refilled today. In the future consider aquatic PT and acupuncture therapy. Follow up ongoing assessment and ongoing development of integrative and comprehensive plan of care for chronic pain. GOALS:  To establish complementary and integrative plan of care to address chronic pain issues while minimizing pharmaceuticals to maximize patient's function improve quality of life. Education:  Patient again educated on the importance of strict compliance with the opioid care agreement while on opioid therapy. Patient also again educated that they should avoid driving while on chronic opioid therapy. Also advised to avoid alcohol and to avoid benzodiazepines while on opioid therapy. F/u:. Follow-up Disposition:  Return in about 30 days (around 8/2/2018) for 30 min.

## 2018-07-31 ENCOUNTER — OFFICE VISIT (OUTPATIENT)
Dept: PAIN MANAGEMENT | Age: 43
End: 2018-07-31

## 2018-07-31 VITALS
HEIGHT: 61 IN | BODY MASS INDEX: 29.64 KG/M2 | WEIGHT: 157 LBS | RESPIRATION RATE: 14 BRPM | SYSTOLIC BLOOD PRESSURE: 113 MMHG | DIASTOLIC BLOOD PRESSURE: 73 MMHG | TEMPERATURE: 98.8 F | HEART RATE: 103 BPM

## 2018-07-31 DIAGNOSIS — G89.4 CHRONIC PAIN SYNDROME: ICD-10-CM

## 2018-07-31 DIAGNOSIS — M25.50 PAIN IN JOINT, MULTIPLE SITES: ICD-10-CM

## 2018-07-31 DIAGNOSIS — N30.10 INTERSTITIAL CYSTITIS: ICD-10-CM

## 2018-07-31 DIAGNOSIS — Z79.899 ENCOUNTER FOR LONG-TERM (CURRENT) USE OF HIGH-RISK MEDICATION: Primary | ICD-10-CM

## 2018-07-31 DIAGNOSIS — M79.7 FIBROMYALGIA: ICD-10-CM

## 2018-07-31 DIAGNOSIS — R10.84 GENERALIZED ABDOMINAL PAIN: ICD-10-CM

## 2018-07-31 RX ORDER — HYDROMORPHONE HYDROCHLORIDE 8 MG/1
8 TABLET ORAL
Qty: 90 TAB | Refills: 0 | Status: SHIPPED | OUTPATIENT
Start: 2018-08-16 | End: 2018-08-28 | Stop reason: SDUPTHER

## 2018-07-31 RX ORDER — METHADONE HYDROCHLORIDE 10 MG/1
10 TABLET ORAL 3 TIMES DAILY
Qty: 90 TAB | Refills: 0 | Status: SHIPPED | OUTPATIENT
Start: 2018-08-16 | End: 2018-08-28 | Stop reason: SDUPTHER

## 2018-07-31 RX ORDER — GABAPENTIN 300 MG/1
300 CAPSULE ORAL 3 TIMES DAILY
Qty: 90 CAP | Refills: 2 | Status: SHIPPED | OUTPATIENT
Start: 2018-07-31 | End: 2018-08-30

## 2018-07-31 RX ORDER — DULOXETIN HYDROCHLORIDE 60 MG/1
60 CAPSULE, DELAYED RELEASE ORAL 2 TIMES DAILY
Qty: 60 CAP | Refills: 0 | Status: SHIPPED | OUTPATIENT
Start: 2018-07-31 | End: 2018-08-30

## 2018-07-31 RX ORDER — DIPHENHYDRAMINE HCL 25 MG
50 CAPSULE ORAL
COMMUNITY

## 2018-07-31 NOTE — PROGRESS NOTES
Referral date before 2011, source PCP Dr Corey Posada and for chronic pelvic pain due to interstitial cystitis. HPI:  Camille Grimes is a 41 y.o. female here for f/u visit for ongoing evaluation of chronic abdominal/pelvic pain due to interstitial cystitis and generalized pain due to fibromyalgia. She is s/p total hysterectomy due to endometriosis. She has had Elmiron, pelvic floor exercises, dilation and placement of inter-stem. Pt was last seen here on 7/3/2018. Pt denies interval changes on the character or distribution of pain. Pain is located to her lower abdomen/pelvic area and \"all over\". The pain is described as stabbing and aching with constant throbbing. Pain at its best is 4/10. Pain at its worse is 10/10. The pain is worsened by pelvic floor physical therapy in the past. Symptoms are relieved by methadone medication, dilaudid medication, Azo medication, baclofen medication, heating pad, cold compress and massage. Pt has tried motrin, tylenol, Amitriptyline medication and topical compound cream with no perceived benefit. She has tried Lyrica in the past which helped greatly but caused her to gain weight and therefore had to be stopped. Since last visit, pt has not gotten her EKG as ordered; she has not followed up with psychiatry yet due to insurance changing. Social History     Social History    Marital status:      Spouse name: N/A    Number of children: N/A    Years of education: N/A     Occupational History    Not on file.      Social History Main Topics    Smoking status: Former Smoker     Packs/day: 0.50     Years: 10.00    Smokeless tobacco: Never Used    Alcohol use Yes      Comment: 1 every 2 months    Drug use: No    Sexual activity: Not on file      Comment: hysterectomy     Other Topics Concern    Not on file     Social History Narrative     Family History   Problem Relation Age of Onset    Post-op Nausea/Vomiting Sister     Malignant Hyperthermia Neg Hx     Pseudocholinesterase Deficiency Neg Hx     Delayed Awakening Neg Hx     Emergence Delirium Neg Hx     Post-op Cognitive Dysfunction Neg Hx      Allergies   Allergen Reactions    Augmentin [Amoxicillin-Pot Clavulanate] Anaphylaxis    Amoxicillin Hives and Itching     Itching of throat    Ciprofloxacin Other (comments)     Redness of arm above IV site    Darvocet A500 [Propoxyphene N-Acetaminophen] Hives    Macrobid [Nitrofurantoin Monohyd/M-Cryst] Hives    Nubain [Nalbuphine] Hives and Other (comments)     Muscle spasms    Phenergan [Promethazine] Other (comments)     Makes me feel like Im on fire from the inside out and causes involuntary muscle spasms.  Toradol [Ketorolac Tromethamine] Itching    Vibramycin [Doxycycline Calcium] Hives and Itching     Itching of the throat     Past Medical History:   Diagnosis Date    Anxiety     Arthritis     osteopenia    Chronic kidney disease     kidney stones    Chronic pain     bladder    Depression     Fibromyalgia     Interstitial cystitis     Nausea & vomiting     Other ill-defined conditions(799.89)     chronic interstitual cyctitis    Other ill-defined conditions(799.89)     endometriosis    Psychiatric disorder      Past Surgical History:   Procedure Laterality Date    HX APPENDECTOMY      HX  SECTION      HX HEENT      oral surgery wisdom teeth extractiion    HX HYSTERECTOMY      LAVH    HX PELVIC LAPAROSCOPY      x 5    HX UROLOGICAL      cysto and hydrodistention x 4    HX UROLOGICAL      Interstim implant    HX UROLOGICAL  2013    revision interstim     Current Outpatient Prescriptions on File Prior to Visit   Medication Sig    baclofen (LIORESAL) 10 mg tablet 1 tab po up to three times per day for muscle spasms    ondansetron (ZOFRAN ODT) 4 mg disintegrating tablet Take 1-2 tablets every 6-8 hours as needed for nausea and vomiting.  NALOXONE HCL (NARCAN IJ) by Injection route.     EPINEPHrine (AUVI-Q) 0.3 mg/0.3 mL (1:1,000) injection 0.3 mL by IntraMUSCular route once as needed for Other (severe allergic reaction ) for 1 dose. No current facility-administered medications on file prior to visit. Review of Systems   Constitutional: Negative for chills and fever. Respiratory: Negative for shortness of breath and wheezing. Cardiovascular: Negative for chest pain. Gastrointestinal: Positive for abdominal pain, constipation (improved with eating prunes) and nausea (normal for patient). Negative for blood in stool, diarrhea and vomiting. Genitourinary: Positive for dysuria, frequency and urgency. Negative for hematuria. Musculoskeletal: Negative for falls. Neurological: Positive for dizziness. Psychiatric/Behavioral: Positive for depression. Negative for substance abuse. The patient has insomnia. Pt denies current suicidal ideations but does state she has in the past and is not new. She has no plan and \"will never do it\" because of her son. She states she is \"too much of a mother to do anything like that\". Denies homicidal ideas. Opioid specific risk: depression, insomnia and anxiety. Opioid specific history: continuous opioid use for past 6 years. Vitals:    18 0827   BP: 113/73   Pulse: (!) 103   Resp: 14   Temp: 98.8 °F (37.1 °C)   TempSrc: Oral   Weight: 71.2 kg (157 lb)   Height: 5' 1\" (1.549 m)   PainSc:   5       [id number]  QT/QTc: 342/466    Physical Exam    AFVSS despite mild tachycardia, no acute distress, normal body habitus. A&OXs 3. Normocephalic, atraumatic. Conjugate gaze, clear sclerae. Gait is within functional limits. Balance is within functional limits. Abdominal soft. Normal bowel sounds. No rigidity or rebound. Tenderness noted to suprapubic region. Non labored breathing. CTA B/L. No wheezing, rales or rhonchi. Heart RRR. S1 and S2 noted. No murmurs.        Calculated MEQ - 201  Naloxone rescue - yes  Prophylactic bowel program - no  Date of last OCA 1/9/2018  Last UDS today, consistent   date checked today, findings consistent    [address]  [address] [facility]  [phone]    Last Visit   Today  ORT - 3   Not done  TEE - 53%   60%  PGIC - 5 and 3  5 and 3  COMM - 10   14    PHQ -- . PHQ over the last two weeks 7/31/2018   PHQ Not Done -   Little interest or pleasure in doing things Nearly every day   Feeling down, depressed, irritable, or hopeless More than half the days   Total Score PHQ 2 5     PHQ 9 with score of 21 - pt instructed to follow up with PCP in 2-3 days. Pt has active referral to mental health/psychiatry but has not made appointment. Advised she needs to make appointment as soon as possible. Any suicidal or homicidal ideations pt instructed to go to ER; pt states understanding. DSM V-OUD Screen - defer to next visit     Assessment/Plan:     ICD-10-CM ICD-9-CM    1. Encounter for long-term (current) use of high-risk medication Z79.899 V58.69    2. Generalized abdominal pain R10.84 789.07 DULoxetine (CYMBALTA) 60 mg capsule      gabapentin (NEURONTIN) 300 mg capsule      methadone (DOLOPHINE) 10 mg tablet      HYDROmorphone (DILAUDID) 8 mg tablet   3. Chronic pain syndrome G89.4 338.4 DULoxetine (CYMBALTA) 60 mg capsule      gabapentin (NEURONTIN) 300 mg capsule      methadone (DOLOPHINE) 10 mg tablet      HYDROmorphone (DILAUDID) 8 mg tablet   4. Pain in joint, multiple sites M25.50 719.49 DULoxetine (CYMBALTA) 60 mg capsule      gabapentin (NEURONTIN) 300 mg capsule      methadone (DOLOPHINE) 10 mg tablet      HYDROmorphone (DILAUDID) 8 mg tablet   5. Fibromyalgia M79.7 729.1 DULoxetine (CYMBALTA) 60 mg capsule      gabapentin (NEURONTIN) 300 mg capsule      methadone (DOLOPHINE) 10 mg tablet      HYDROmorphone (DILAUDID) 8 mg tablet   6.  Interstitial cystitis N30.10 595.1       Pt currently taking methadone 10 mg tablets-take 1.5 tablets in the morning, 1 tablet in the afternoon and 1 tablet in the evening and Hydromorphone 8 mg 3 times a day as needed for pain; her MME is 201. Today we will continue the weaning of her opioid medication with the goal of being opioid free. Pt will continue methadone 10 mg tablets but instructed to take 1 tablet TID; her hydromorphone 8 mg 3 times a day as needed for pain will remain the same. Her new MME is 186. She has difficulty with the wheezing or difficulty with cravings we will consider referral to mental health for ongoing assessment and treatment for opioid use disorder. At her next visit, the plan is to decrease methadone 10 mg tablets-take 10mg in the morning, 10mg in the afternoon and 5mg in the evening; her hydromorphone the same; pending safety and compliance her new MME will be 171. Continue Baclofen as previously prescribed. Pt unable to increase Cymbalta to BID for pain due to not having enough tablets in prescription, will reorder with correct dosing of BID. Pt has been taking Neurontin 300mg TID due to \"dry mouth\"; reordered this medication with changed dosing. Discussed importance of getting EKG done before next visit. In the future consider aquatic PT and acupuncture therapy. Follow up ongoing assessment and ongoing development of integrative and comprehensive plan of care for chronic pain. GOALS:  To establish complementary and integrative plan of care to address chronic pain issues while minimizing pharmaceuticals to maximize patient's function improve quality of life. Education:  Patient again educated on the importance of strict compliance with the opioid care agreement while on opioid therapy. Patient also again educated that they should avoid driving while on chronic opioid therapy. Also advised to avoid alcohol and to avoid benzodiazepines while on opioid therapy. F/u:. Follow-up Disposition:  Return in about 30 days (around 8/30/2018) for 30 min.

## 2018-07-31 NOTE — PATIENT INSTRUCTIONS
Safe Use of Opioid Pain Medicine: Care Instructions  Your Care Instructions  Pain is your body's way of warning you that something is wrong. Pain feels different for everybody. Only you can describe your pain. A doctor can suggest or prescribe many types of medicines for pain. These range from over-the-counter medicines like acetaminophen (Tylenol) to powerful medicines called opioids. Examples of opioids are fentanyl, hydrocodone, morphine, and oxycodone. Heroin is an illegal opioid  Opioids are strong medicines. They can help you manage pain when you use them the right way. But if you misuse them, they can cause serious harm and even death. For these reasons, doctors are very careful about how they prescribe opioids. If you decide to take opioids, here are some things to remember. · Keep your doctor informed. You can get addicted to opioids. The risk is higher if you have a history of substance use. Your doctor will monitor you closely for signs of misuse and addiction and to figure out when you no longer need to take opioids. · Make a treatment plan. The goal of your plan is to be able to function and do the things you need to do, even if you still have some pain. You might be able to manage your pain with other non-opioid options like physical therapy, relaxation, or over-the-counter pain medicines. · Be aware of the side effects. Opioids can cause serious side effects, such as constipation, dry mouth, and nausea. And over time, you may need a higher dose to get pain relief. This is called tolerance. Your body also gets used to opioids. This is called physical dependence. If you suddenly stop taking them, you may have withdrawal symptoms. The doctor carefully considered what pain medicine is right for you. You may not have received opioids if your doctor was concerned about drug interactions or your safety, or if he or she had other concerns. It is best to have one doctor or clinic treat your pain. This way you will get the pain medicine that will help you the most. And a doctor will be able to watch for any problems that the medicine might cause. The doctor has checked you carefully, but problems can develop later. If you notice any problems or new symptoms,  get medical treatment right away. Follow-up care is a key part of your treatment and safety. Be sure to make and go to all appointments, and call your doctor if you are having problems. It's also a good idea to know your test results and keep a list of the medicines you take. How can you care for yourself at home? · If you need to take opioids to manage your pain, remember these safety tips. ¨ Follow directions carefully. It's easy to misuse opioids if you take a dose other than what's prescribed by your doctor. This can lead to overdose and even death. Even sharing them with someone they weren't meant for is misuse. ¨ Be cautious. Opioids may affect your judgment and decision making. Do not drive or operate machinery until you can think clearly. Talk with your doctor about when it is safe to drive. ¨ Reduce the risk of drug interactions. Opioids can be dangerous if you take them with alcohol or with certain drugs like sleeping pills and muscle relaxers. Make sure your doctor knows about all the other medicines you take, including over-the-counter medicines. Don't start any new medicines before you talk to your doctor or pharmacist.  Christy Shaw Keep others safe. Store opioids in a safe and secure place. Make sure that pets, children, friends, and family can't get to them. When you're done using opioids, make sure to properly dispose of them. You can either use a community drug take-back program or your drugstore's mail-back program. If one of these programs isn't available, you can flush opioid skin patches and unused opioid pills down the toilet. ¨ Reduce the risk of overdose. Misuse of opioids can be very dangerous.  Protect yourself by asking your doctor about a naloxone rescue kit. It can help you-and even save your life-if you take too much of an opioid. · Try other ways to reduce pain. ¨ Relax, and reduce stress. Relaxation techniques such as deep breathing or meditation can help. ¨ Keep moving. Gentle, daily exercise can help reduce pain over the long run. Try low- or no-impact exercises such as walking, swimming, and stationary biking. Do stretches to stay flexible. ¨ Try heat, cold packs, and massage. ¨ Get enough sleep. Pain can make you tired and drain your energy. Talk with your doctor if you have trouble sleeping because of pain. ¨ Think positive. Your thoughts can affect your pain level. Do things that you enjoy to distract yourself when you have pain instead of focusing on the pain. See a movie, read a book, listen to music, or spend time with a friend. · If you are not taking a prescription pain medicine, ask your doctor if you can take an over-the-counter medicine. When should you call for help? Call your doctor now or seek immediate medical care if:    · You have a new kind of pain.     · You have new symptoms, such as a fever or rash, along with the pain.    Watch closely for changes in your health, and be sure to contact your doctor if:    · You think you might be using too much pain medicine, and you need help to use less or stop.     · Your pain gets worse.     · You would like a referral to a doctor or clinic that specializes in pain management. Where can you learn more? Go to http://santos-tuan.info/. Enter R108 in the search box to learn more about \"Safe Use of Opioid Pain Medicine: Care Instructions. \"  Current as of: September 10, 2017  Content Version: 11.7  © 1378-8320 "[employer]. Care instructions adapted under license by [employer] (which disclaims liability or warranty for this information).  If you have questions about a medical condition or this instruction, always ask your healthcare professional. [place] [address] any warranty or liability for your use of this information.

## 2018-07-31 NOTE — MR AVS SNAPSHOT
[address] [facility] [address] 
[phone] Patient: Stefan Boucher MRN: [id number] :1975 Visit Information Date & Time Provider Department Dept. Phone Encounter #  
 2018  8:30 AM Erika Ramesh, [address] Pain Management [facility] [id number] Follow-up Instructions Return in about 30 days (around 2018) for 30 min. Upcoming Health Maintenance Date Due DTaP/Tdap/Td series (1 - Tdap) 1998 PAP AKA CERVICAL CYTOLOGY 1998 Influenza Age 5 to Adult 2018 Allergies as of 2018  Review Complete On: 2018 By: Sherren Rosser, LPN Severity Noted Reaction Type Reactions Augmentin [Amoxicillin-pot Clavulanate] High 2013    Anaphylaxis Amoxicillin  2013   Topical Hives, Itching Itching of throat Ciprofloxacin  2013   Topical Other (comments) Redness of arm above IV site Darvocet A500 [Propoxyphene N-acetaminophen]  2013   Topical Hives Macrobid [Nitrofurantoin Monohyd/m-cryst]  2013   Side Effect Hives Nubain [Nalbuphine]  2013   Topical Hives, Other (comments) Muscle spasms Phenergan [Promethazine]  2016    Other (comments) Makes me feel like Im on fire from the inside out and causes involuntary muscle spasms. Toradol [Ketorolac Tromethamine]  2016    Itching Vibramycin [Doxycycline Calcium]  2013   Topical Hives, Itching Itching of the throat Current Immunizations  Never Reviewed No immunizations on file. Not reviewed this visit You Were Diagnosed With   
  
 Codes Comments Encounter for long-term (current) use of high-risk medication    -  Primary ICD-10-CM: Z29.986 ICD-9-CM: V58.69 Generalized abdominal pain     ICD-10-CM: R10.84 ICD-9-CM: 789.07 Chronic pain syndrome     ICD-10-CM: G89.4 ICD-9-CM: 338.4 Pain in joint, multiple sites     ICD-10-CM: M25.50 ICD-9-CM: 719.49 Fibromyalgia     ICD-10-CM: M79.7 ICD-9-CM: 729.1 Interstitial cystitis     ICD-10-CM: N30.10 ICD-9-CM: 595.1 Vitals BP Pulse Temp Resp Height(growth percentile) Weight(growth percentile) 113/73 (BP 1 Location: Right arm, BP Patient Position: Sitting) (!) 103 98.8 °F (37.1 °C) (Oral) 14 5' 1\" (1.549 m) 157 lb (71.2 kg) BMI OB Status Smoking Status 29.66 kg/m2 Hysterectomy Former Smoker Vitals History BMI and BSA Data Body Mass Index Body Surface Area  
 29.66 kg/m 2 1.75 m 2 Preferred Pharmacy Pharmacy Name Phone [address] [facility]. [address] [place] [address] [phone] Your Updated Medication List  
  
   
This list is accurate as of 7/31/18  9:38 AM.  Always use your most recent med list.  
  
  
  
  
 baclofen 10 mg tablet Commonly known as:  LIORESAL  
1 tab po up to three times per day for muscle spasms BENADRYL 25 mg capsule Generic drug:  diphenhydrAMINE Take 50 mg by mouth two (2) times daily as needed. DULoxetine 60 mg capsule Commonly known as:  CYMBALTA Take 1 Cap by mouth two (2) times a day for 30 days. EPINEPHrine 0.3 mg/0.3 mL (1:1,000) injection Commonly known as:  AUVI-Q  
0.3 mL by IntraMUSCular route once as needed for Other (severe allergic reaction ) for 1 dose. gabapentin 300 mg capsule Commonly known as:  NEURONTIN Take 1 Cap by mouth three (3) times daily for 30 days. HYDROmorphone 8 mg tablet Commonly known as:  DILAUDID Take 1 Tab by mouth three (3) times daily as needed for Pain for up to 30 days. Max Daily Amount: 24 mg. For breakthrough pain. Indications: Pain Start taking on:  8/16/2018  
  
 methadone 10 mg tablet Commonly known as:  DOLOPHINE Take 1 Tab by mouth three (3) times daily for 30 days. Max Daily Amount: 30 mg. Indications: Chronic Pain, Severe Pain Start taking on:  8/16/2018 NARCAN IJ  
by Injection route. ondansetron 4 mg disintegrating tablet Commonly known as:  ZOFRAN ODT Take 1-2 tablets every 6-8 hours as needed for nausea and vomiting. Prescriptions Printed Refills  
 methadone (DOLOPHINE) 10 mg tablet 0 Starting on: 8/16/2018 Sig: Take 1 Tab by mouth three (3) times daily for 30 days. Max Daily Amount: 30 mg. Indications: Chronic Pain, Severe Pain Class: Print Route: Oral  
 HYDROmorphone (DILAUDID) 8 mg tablet 0 Starting on: 8/16/2018 Sig: Take 1 Tab by mouth three (3) times daily as needed for Pain for up to 30 days. Max Daily Amount: 24 mg. For breakthrough pain. Indications: Pain Class: Print Route: Oral  
  
Prescriptions Sent to Pharmacy Refills DULoxetine (CYMBALTA) 60 mg capsule 0 Sig: Take 1 Cap by mouth two (2) times a day for 30 days. Class: Normal  
 Pharmacy: [facility] Arielle Hammond [address] [place] [address] Ph #: [phone] Route: Oral  
 gabapentin (NEURONTIN) 300 mg capsule 2 Sig: Take 1 Cap by mouth three (3) times daily for 30 days. Class: Normal  
 [facility] [address] [place] [address] Ph #: [phone] Route: Oral  
  
Follow-up Instructions Return in about 30 days (around 8/30/2018) for 30 min. Patient Instructions Safe Use of Opioid Pain Medicine: Care Instructions Your Care Instructions Pain is your body's way of warning you that something is wrong. Pain feels different for everybody. Only you can describe your pain. A doctor can suggest or prescribe many types of medicines for pain. These range from over-the-counter medicines like acetaminophen (Tylenol) to powerful medicines called opioids. Examples of opioids are fentanyl, hydrocodone, morphine, and oxycodone. Heroin is an illegal opioid Opioids are strong medicines.  They can help you manage pain when you use them the right way. But if you misuse them, they can cause serious harm and even death. For these reasons, doctors are very careful about how they prescribe opioids. If you decide to take opioids, here are some things to remember. · Keep your doctor informed. You can get addicted to opioids. The risk is higher if you have a history of substance use. Your doctor will monitor you closely for signs of misuse and addiction and to figure out when you no longer need to take opioids. · Make a treatment plan. The goal of your plan is to be able to function and do the things you need to do, even if you still have some pain. You might be able to manage your pain with other non-opioid options like physical therapy, relaxation, or over-the-counter pain medicines. · Be aware of the side effects. Opioids can cause serious side effects, such as constipation, dry mouth, and nausea. And over time, you may need a higher dose to get pain relief. This is called tolerance. Your body also gets used to opioids. This is called physical dependence. If you suddenly stop taking them, you may have withdrawal symptoms. The doctor carefully considered what pain medicine is right for you. You may not have received opioids if your doctor was concerned about drug interactions or your safety, or if he or she had other concerns. It is best to have one doctor or clinic treat your pain. This way you will get the pain medicine that will help you the most. And a doctor will be able to watch for any problems that the medicine might cause. The doctor has checked you carefully, but problems can develop later. If you notice any problems or new symptoms,  get medical treatment right away. Follow-up care is a key part of your treatment and safety. Be sure to make and go to all appointments, and call your doctor if you are having problems. It's also a good idea to know your test results and keep a list of the medicines you take. How can you care for yourself at home? · If you need to take opioids to manage your pain, remember these safety tips. ¨ Follow directions carefully. It's easy to misuse opioids if you take a dose other than what's prescribed by your doctor. This can lead to overdose and even death. Even sharing them with someone they weren't meant for is misuse. ¨ Be cautious. Opioids may affect your judgment and decision making. Do not drive or operate machinery until you can think clearly. Talk with your doctor about when it is safe to drive. ¨ Reduce the risk of drug interactions. Opioids can be dangerous if you take them with alcohol or with certain drugs like sleeping pills and muscle relaxers. Make sure your doctor knows about all the other medicines you take, including over-the-counter medicines. Don't start any new medicines before you talk to your doctor or pharmacist. 
Lorraine Dominguez Keep others safe. Store opioids in a safe and secure place. Make sure that pets, children, friends, and family can't get to them. When you're done using opioids, make sure to properly dispose of them. You can either use a community drug take-back program or your drugstore's mail-back program. If one of these programs isn't available, you can flush opioid skin patches and unused opioid pills down the toilet. ¨ Reduce the risk of overdose. Misuse of opioids can be very dangerous. Protect yourself by asking your doctor about a naloxone rescue kit. It can help you-and even save your life-if you take too much of an opioid. · Try other ways to reduce pain. ¨ Relax, and reduce stress. Relaxation techniques such as deep breathing or meditation can help. ¨ Keep moving. Gentle, daily exercise can help reduce pain over the long run. Try low- or no-impact exercises such as walking, swimming, and stationary biking. Do stretches to stay flexible. ¨ Try heat, cold packs, and massage. ¨ Get enough sleep. Pain can make you tired and drain your energy.  Talk with your doctor if you have trouble sleeping because of pain. ¨ Think positive. Your thoughts can affect your pain level. Do things that you enjoy to distract yourself when you have pain instead of focusing on the pain. See a movie, read a book, listen to music, or spend time with a friend. · If you are not taking a prescription pain medicine, ask your doctor if you can take an over-the-counter medicine. When should you call for help? Call your doctor now or seek immediate medical care if: 
  · You have a new kind of pain.  
  · You have new symptoms, such as a fever or rash, along with the pain.  
 Watch closely for changes in your health, and be sure to contact your doctor if: 
  · You think you might be using too much pain medicine, and you need help to use less or stop.  
  · Your pain gets worse.  
  · You would like a referral to a doctor or clinic that specializes in pain management. Where can you learn more? Go to http://santos-tuan.info/. Enter R108 in the search box to learn more about \"Safe Use of Opioid Pain Medicine: Care Instructions. \" Current as of: September 10, 2017 Content Version: 11.7 © 1669-9295 [employer]. Care instructions adapted under license by [employer] (which disclaims liability or warranty for this information). If you have questions about a medical condition or this instruction, always ask your healthcare professional. [place] [address] any warranty or liability for your use of this information. Introducing hospitals & HEALTH SERVICES! Dear Leonard Lozano: Thank you for requesting a [employer] account. Our records indicate that you already have an active [employer] account. You can access your account anytime at [email or web address] Did you know that you can access your hospital and ER discharge instructions at any time in Mobakids?   You can also review all of your test results from your hospital stay or ER visit. Additional Information If you have questions, please visit the Frequently Asked Questions section of the [facility] website at [email or web address] Remember, [facility] is NOT to be used for urgent needs. For medical emergencies, dial 911. Now available from your iPhone and Android! Please provide this summary of care documentation to your next provider. Your primary care clinician is listed as Daxa Hidalgo. If you have any questions after today's visit, please call [phone].

## 2018-07-31 NOTE — PROGRESS NOTES
Nursing Notes    Patient presents to the office today in follow-up. Patient rates her pain at 5/10 on the numerical pain scale. Reviewed medications with counts as follows:    Rx Date filled Qty Dispensed Pill Count Last Dose Short   Methadone 10 mg 07/17/18 105 58 today no   Dilaudid 8 mg 07/17/18 90 50 today no                            POC UDS was not performed in office today    Any new labs or imaging since last appointment? NO. EKG not done yet    Have you been to an emergency room (ER) or urgent care clinic since your last visit? NO            Have you been hospitalized since your last visit? NO     If yes, where, when, and reason for visit? Have you seen or consulted any other health care providers outside of the [address] [place] [address]  since your last visit? NO     If yes, where, when, and reason for visit? Ms. Tammy Nicole has a reminder for a \"due or due soon\" health maintenance. I have asked that she contact her primary care provider for follow-up on this health maintenance. PHQ over the last two weeks 7/31/2018   PHQ Not Done -   Little interest or pleasure in doing things Nearly every day   Feeling down, depressed, irritable, or hopeless More than half the days   Total Score PHQ 2 5     Provider made aware of the above score.  Pt is waiting for her insurance to change so that she can find a psychiatrist

## 2018-08-17 ENCOUNTER — TELEPHONE (OUTPATIENT)
Dept: PAIN MANAGEMENT | Age: 43
End: 2018-08-17

## 2018-08-28 ENCOUNTER — OFFICE VISIT (OUTPATIENT)
Dept: PAIN MANAGEMENT | Age: 43
End: 2018-08-28

## 2018-08-28 VITALS
HEART RATE: 113 BPM | SYSTOLIC BLOOD PRESSURE: 119 MMHG | RESPIRATION RATE: 14 BRPM | TEMPERATURE: 99 F | BODY MASS INDEX: 24.55 KG/M2 | DIASTOLIC BLOOD PRESSURE: 85 MMHG | WEIGHT: 130 LBS | HEIGHT: 61 IN

## 2018-08-28 DIAGNOSIS — G89.4 CHRONIC PAIN SYNDROME: Primary | ICD-10-CM

## 2018-08-28 DIAGNOSIS — M62.838 MUSCLE SPASM: ICD-10-CM

## 2018-08-28 DIAGNOSIS — M79.7 FIBROMYALGIA: Primary | ICD-10-CM

## 2018-08-28 DIAGNOSIS — Z79.899 ENCOUNTER FOR LONG-TERM (CURRENT) USE OF HIGH-RISK MEDICATION: ICD-10-CM

## 2018-08-28 DIAGNOSIS — N30.10 INTERSTITIAL CYSTITIS: ICD-10-CM

## 2018-08-28 DIAGNOSIS — M79.10 MYALGIA: ICD-10-CM

## 2018-08-28 RX ORDER — CYCLOBENZAPRINE HCL 5 MG
5 TABLET ORAL
Qty: 60 TAB | Refills: 2 | Status: SHIPPED | OUTPATIENT
Start: 2018-08-28 | End: 2018-09-17 | Stop reason: SINTOL

## 2018-08-28 RX ORDER — HYDROMORPHONE HYDROCHLORIDE 8 MG/1
8 TABLET ORAL
Qty: 90 TAB | Refills: 0 | Status: SHIPPED | OUTPATIENT
Start: 2018-09-15 | End: 2018-09-17 | Stop reason: SDUPTHER

## 2018-08-28 RX ORDER — DOCUSATE SODIUM 100 MG/1
100 CAPSULE, LIQUID FILLED ORAL
Qty: 60 CAP | Refills: 2 | Status: SHIPPED | OUTPATIENT
Start: 2018-08-28 | End: 2018-11-26

## 2018-08-28 RX ORDER — METHADONE HYDROCHLORIDE 10 MG/1
10 TABLET ORAL 2 TIMES DAILY
Qty: 60 TAB | Refills: 0 | Status: SHIPPED | OUTPATIENT
Start: 2018-09-15 | End: 2018-09-17 | Stop reason: SDUPTHER

## 2018-08-28 RX ORDER — SENNOSIDES 8.6 MG/1
1 TABLET ORAL
Qty: 30 TAB | Refills: 2 | Status: SHIPPED | OUTPATIENT
Start: 2018-08-28 | End: 2022-09-19

## 2018-08-28 NOTE — PROGRESS NOTES
Referral date , source  . Social History     Social History    Marital status:      Spouse name: N/A    Number of children: N/A    Years of education: N/A     Occupational History    Not on file. Social History Main Topics    Smoking status: Former Smoker     Packs/day: 0.50     Years: 10.00    Smokeless tobacco: Never Used    Alcohol use Yes      Comment: 1 every 2 months    Drug use: No    Sexual activity: Not on file      Comment: hysterectomy     Other Topics Concern    Not on file     Social History Narrative     Family History   Problem Relation Age of Onset    Post-op Nausea/Vomiting Sister     Malignant Hyperthermia Neg Hx     Pseudocholinesterase Deficiency Neg Hx     Delayed Awakening Neg Hx     Emergence Delirium Neg Hx     Post-op Cognitive Dysfunction Neg Hx      Allergies   Allergen Reactions    Augmentin [Amoxicillin-Pot Clavulanate] Anaphylaxis    Amoxicillin Hives and Itching     Itching of throat    Ciprofloxacin Other (comments)     Redness of arm above IV site    Darvocet A500 [Propoxyphene N-Acetaminophen] Hives    Macrobid [Nitrofurantoin Monohyd/M-Cryst] Hives    Nubain [Nalbuphine] Hives and Other (comments)     Muscle spasms    Phenergan [Promethazine] Other (comments)     Makes me feel like Im on fire from the inside out and causes involuntary muscle spasms.      Toradol [Ketorolac Tromethamine] Itching    Vibramycin [Doxycycline Calcium] Hives and Itching     Itching of the throat     Past Medical History:   Diagnosis Date    Anxiety     Arthritis     osteopenia    Chronic kidney disease     kidney stones    Chronic pain     bladder    Depression     Fibromyalgia     Interstitial cystitis     Nausea & vomiting     Other ill-defined conditions(799.89)     chronic interstitual cyctitis    Other ill-defined conditions(799.89)     endometriosis    Psychiatric disorder      Past Surgical History:   Procedure Laterality Date    HX APPENDECTOMY  HX  SECTION      HX HEENT      oral surgery wisdom teeth extractiion    HX HYSTERECTOMY  2004    [facility]    HX PELVIC LAPAROSCOPY      x 5    HX UROLOGICAL      cysto and hydrodistention x 4    HX UROLOGICAL      Interstim implant    HX UROLOGICAL  2013    revision interstim     Current Outpatient Prescriptions on File Prior to Visit   Medication Sig    diphenhydrAMINE (BENADRYL) 25 mg capsule Take 50 mg by mouth two (2) times daily as needed.  DULoxetine (CYMBALTA) 60 mg capsule Take 1 Cap by mouth two (2) times a day for 30 days.  baclofen (LIORESAL) 10 mg tablet 1 tab po up to three times per day for muscle spasms    gabapentin (NEURONTIN) 300 mg capsule Take 1 Cap by mouth three (3) times daily for 30 days.  ondansetron (ZOFRAN ODT) 4 mg disintegrating tablet Take 1-2 tablets every 6-8 hours as needed for nausea and vomiting.  NALOXONE HCL (NARCAN IJ) 1 Dose by Injection route as needed. For accidental overdose    EPINEPHrine (AUVI-Q) 0.3 mg/0.3 mL (1:1,000) injection 0.3 mL by IntraMUSCular route once as needed for Other (severe allergic reaction ) for 1 dose. No current facility-administered medications on file prior to visit. HPI:  Chaim Barbour is a 41 y.o. female here for f/u visit for ongoing evaluation of chronic pain from interstitial cystitis. Patient also has implanted InterStim which required revision in . Pt was last seen here on 2018. Pt denies interval changes in the character or distribution of pain. Pain is located as a stabbing and aching pain across the anterior belt line bilaterally toward the lower abdomen. The pain is described pain that ranges from 4-10/10. Robaxin infusions caused HA so they stopped. Never had lidocaine infusion or ketamine infusion. Had bladder infusions of lidocaine in the past which were helpful but cumbersome. Last Pelvic floor PT was many years ago and caused increased pain.    Has ongoing difficulty sleeping but this is slowly improving. Tried acupuncture twice without perceived benefit. Last EKG was done  6/17/17. --No longer taking gabapentin 300 mg 2 times daily stopped due to dry mouth and wt gain. --has also tried amitriptyline which worked for a while but stopped working and has not taken it since around 4 years ago. --Baclofen 10 mg up to 3 times daily is sometimes helpful. Has not tried other muscle relaxers. --Cymbalta 60 mg twice daily has been helpful for pain and depression. Dilaudid 8 mg up to 3 times daily as needed. Methadone 10 mg 3 times daily. Patient reports mild but incomplete analgesia from her opioid regimen. She denies adverse effects except for constipation and this is controlled with diet. She denies aberrant behavior. She reports that the opioid regimen helps her to improve her activity tolerance and social interactions. ROS: Negative for fever, chills, vomiting, diarrhea, chest pain, trouble breathing, difficulty swallowing, acute changes in vision, acute changes in hearing, falls, dizziness, bladder incontinence, bowel incontinence, suicidal ideation, homicidal ideation, alcohol use. Review of systems positive for nausea with pain, constipation that is diet controlled, intermittent abdominal pain as baseline, chronic generalized weakness, depression, anxiety. Opioid specific risk: Panic disorder, anxiety, depression, constipation    Opioid specific history:on opioids continuously for 5-6 years. Never had withdrawal symptoms. Vitals:    08/28/18 0831   BP: 119/85   Pulse: (!) 113   Resp: 14   Temp: 99 °F (37.2 °C)   TempSrc: Oral   Weight: 59 kg (130 lb)   Height: 5' 1\" (1.549 m)   PainSc:   5         EKG: not done. PE:  AFVSS with tachycardia, no acute distress, normal body habitus. A&OXs 3.  normocephalic, atraumatic. Conjugate gaze, clear sclerae. Mood is pleasant and appropriate. Patient is cooperative.   Speech is clear and appropriate. Gait is within functional limits. Balance is within functional limits. Calculated MEQ -186<<201  Naloxone rescue -yes  Prophylactic bowel program -yes  Date of last OCA January 2018. Last UDS July 3, 2018, findings were consistent. , date checked today, findings consistent    Primary Care Physician  [address] [place] [address]  [phone]      GIC-not done    TEE -62%     COMM- 1010    PHQ -- . PHQ over the last two weeks 8/28/2018   PHQ Not Done -   Little interest or pleasure in doing things Several days   Feeling down, depressed, irritable, or hopeless Several days   Total Score PHQ 2 2     DSM V-OUD Screen-- mild     Assessment/Plan:     ICD-10-CM ICD-9-CM    1. Chronic pain syndrome G89.4 338.4 HYDROmorphone (DILAUDID) 8 mg tablet      methadone (DOLOPHINE) 10 mg tablet      REFERRAL TO PAIN CLINIC   2. Encounter for long-term (current) use of high-risk medication Z79.899 V58.69 senna (SENNA) 8.6 mg tablet      docusate sodium (COLACE) 100 mg capsule      EKG, 12 LEAD, INITIAL   3. Interstitial cystitis N30.10 595.1 REFERRAL TO PAIN CLINIC   4. Myalgia M79.1 729.1 cyclobenzaprine (FLEXERIL) 5 mg tablet      REFERRAL TO PAIN CLINIC   5. Muscle spasm M62.838 728.85 cyclobenzaprine (FLEXERIL) 5 mg tablet      --EKG has not been completed. Patient was again instructed to have an EKG done because of the dangers posed by methadone therapy. Patient must provide EKG report prior to additional refills being provided of methadone even though it is already being weaned. --MME decreased today from 186 >>>156.    --methadone decreased to 10mg bid  --Continue Dilaudid 8 mg tablets up to 3 times daily at this current rate until methadone has been completely weaned. Then progress with weaning of Dilaudid. --Begin trial of Flexeril 5mg bid prn and if effective consider increasing this as baclofen is replaced slowly.   --Continue baclofen 10 mg as needed 3 times daily  --Continue Cymbalta  --Patient will be provided with referral Dr. Dian Silva in [facility] for consideration of trials of either monthly lidocaine infusions or intermittent ketamine infusions as Dr. Dian Silva may see fit.   --consider pain psychology at next visit. Patient would benefit from additional training and cognitive behavioral therapy, biofeedback, mindfulness meditation and others. --Consider restarting an retrial of amitriptyline or another TCA but not until methadone has been completely weaned. --Educational handouts were given on sleep hygiene    GOALS:  To establish complementary and integrative plan of care to address chronic pain issues while minimizing pharmaceuticals to maximize patient's function improve quality of life. Education:  Patient again educated on the importance of strict compliance with the opioid care agreement while on opioid therapy. Patient also again educated that they should avoid driving while on chronic opioid therapy. Also advised to avoid alcohol and to avoid benzodiazepines while on opioid therapy. Patient Homework:  Continue to independently investigate yoga for persons with chronic pain. F/u:. Follow-up Disposition:  Return in about 4 weeks (around 9/25/2018) for 30 min.

## 2018-08-28 NOTE — PROGRESS NOTES
Nursing Notes    Patient presents to the office today in follow-up. Patient rates her pain at 5/10 on the numerical pain scale. Reviewed medications with counts as follows:    Rx Date filled Qty Dispensed Pill Count Last Dose Short   Methadone 10 mg  08/16/18 90 65 today no   Dilaudid 8 mg  08/16/18 90 56 today no                            POC UDS was not performed in office today    Any new labs or imaging since last appointment? NO    Have you been to an emergency room (ER) or urgent care clinic since your last visit? NO            Have you been hospitalized since your last visit? NO     If yes, where, when, and reason for visit? Have you seen or consulted any other health care providers outside of the [facility]  since your last visit? NO     If yes, where, when, and reason for visit? Ms. Nikki Krishna has a reminder for a \"due or due soon\" health maintenance. I have asked that she contact her primary care provider for follow-up on this health maintenance.     PHQ over the last two weeks 8/28/2018   PHQ Not Done -   Little interest or pleasure in doing things Several days   Feeling down, depressed, irritable, or hopeless Several days   Total Score PHQ 2 2

## 2018-08-28 NOTE — PATIENT INSTRUCTIONS

## 2018-08-28 NOTE — MR AVS SNAPSHOT
[address] [address] 
[phone] Patient: Luis Aiken MRN: [id number] :1975 Visit Information Date & Time Provider Department Dept. Phone Encounter #  
 2018  9:00 AM [facility] [phone] [id number] Follow-up Instructions Return in about 4 weeks (around 2018) for 30 min. Upcoming Health Maintenance Date Due DTaP/Tdap/Td series (1 - Tdap) 1998 PAP AKA CERVICAL CYTOLOGY 1998 Influenza Age 5 to Adult 2018 Allergies as of 2018  Review Complete On: 2018 By: Napoleon Campo DO Severity Noted Reaction Type Reactions Augmentin [Amoxicillin-pot Clavulanate] High 2013    Anaphylaxis Amoxicillin  2013   Topical Hives, Itching Itching of throat Ciprofloxacin  2013   Topical Other (comments) Redness of arm above IV site Darvocet A500 [Propoxyphene N-acetaminophen]  2013   Topical Hives Macrobid [Nitrofurantoin Monohyd/m-cryst]  2013   Side Effect Hives Nubain [Nalbuphine]  2013   Topical Hives, Other (comments) Muscle spasms Phenergan [Promethazine]  2016    Other (comments) Makes me feel like Im on fire from the inside out and causes involuntary muscle spasms. Toradol [Ketorolac Tromethamine]  2016    Itching Vibramycin [Doxycycline Calcium]  2013   Topical Hives, Itching Itching of the throat Current Immunizations  Never Reviewed No immunizations on file. Not reviewed this visit You Were Diagnosed With   
  
 Codes Comments Chronic pain syndrome    -  Primary ICD-10-CM: G89.4 ICD-9-CM: 338.4 Encounter for long-term (current) use of high-risk medication     ICD-10-CM: Z79.899 ICD-9-CM: V58.69 Interstitial cystitis     ICD-10-CM: N30.10 ICD-9-CM: 595.1 Myalgia     ICD-10-CM: M79.1 ICD-9-CM: 729.1 Muscle spasm     ICD-10-CM: C43.883 ICD-9-CM: 728.85 Vitals BP Pulse Temp Resp Height(growth percentile) Weight(growth percentile) 119/85 (BP 1 Location: Right arm, BP Patient Position: Sitting) (!) 113 99 °F (37.2 °C) (Oral) 14 5' 1\" (1.549 m) 130 lb (59 kg) BMI OB Status Smoking Status 24.56 kg/m2 Hysterectomy Former Smoker Vitals History BMI and BSA Data Body Mass Index Body Surface Area 24.56 kg/m 2 1.59 m 2 Preferred Pharmacy Pharmacy Name Phone [address] [facility]. [address] [place] [address] [phone] Your Updated Medication List  
  
   
This list is accurate as of 8/28/18 10:26 AM.  Always use your most recent med list.  
  
  
  
  
 baclofen 10 mg tablet Commonly known as:  LIORESAL  
1 tab po up to three times per day for muscle spasms BENADRYL 25 mg capsule Generic drug:  diphenhydrAMINE Take 50 mg by mouth two (2) times daily as needed. cyclobenzaprine 5 mg tablet Commonly known as:  FLEXERIL Take 1 Tab by mouth two (2) times daily as needed for Muscle Spasm(s) for up to 30 days. docusate sodium 100 mg capsule Commonly known as:  [place] Take 1 Cap by mouth two (2) times daily as needed for Constipation for up to 90 days. DULoxetine 60 mg capsule Commonly known as:  CYMBALTA Take 1 Cap by mouth two (2) times a day for 30 days. EPINEPHrine 0.3 mg/0.3 mL (1:1,000) injection Commonly known as:  AUVI-Q  
0.3 mL by IntraMUSCular route once as needed for Other (severe allergic reaction ) for 1 dose. gabapentin 300 mg capsule Commonly known as:  NEURONTIN Take 1 Cap by mouth three (3) times daily for 30 days. HYDROmorphone 8 mg tablet Commonly known as:  DILAUDID Take 1 Tab by mouth three (3) times daily as needed for Pain for up to 30 days. Max Daily Amount: 24 mg. Indications: Pain Start taking on:  9/15/2018 methadone 10 mg tablet Commonly known as:  DOLOPHINE Take 1 Tab by mouth two (2) times a day for 30 days. Max Daily Amount: 20 mg. Indications: Chronic Pain, Severe Pain Start taking on:  9/15/2018 NARCAN IJ  
1 Dose by Injection route as needed. For accidental overdose  
  
 ondansetron 4 mg disintegrating tablet Commonly known as:  ZOFRAN ODT Take 1-2 tablets every 6-8 hours as needed for nausea and vomiting.  
  
 senna 8.6 mg tablet Commonly known as:  Senna Take 1 Tab by mouth daily as needed for Constipation. Prescriptions Printed Refills HYDROmorphone (DILAUDID) 8 mg tablet 0 Starting on: 9/15/2018 Sig: Take 1 Tab by mouth three (3) times daily as needed for Pain for up to 30 days. Max Daily Amount: 24 mg. Indications: Pain Class: Print Route: Oral  
 methadone (DOLOPHINE) 10 mg tablet 0 Starting on: 9/15/2018 Sig: Take 1 Tab by mouth two (2) times a day for 30 days. Max Daily Amount: 20 mg. Indications: Chronic Pain, Severe Pain Class: Print Route: Oral  
  
Prescriptions Sent to Pharmacy Refills  
 senna (SENNA) 8.6 mg tablet 2 Sig: Take 1 Tab by mouth daily as needed for Constipation. Class: Normal  
 Pharmacy: [address]  Ph #: [phone] Route: Oral  
 docusate sodium (COLACE) 100 mg capsule 2 Sig: Take 1 Cap by mouth two (2) times daily as needed for Constipation for up to 90 days. Class: Normal  
 Pharmacy: [address]  Ph #: [phone] Route: Oral  
 cyclobenzaprine (FLEXERIL) 5 mg tablet 2 Sig: Take 1 Tab by mouth two (2) times daily as needed for Muscle Spasm(s) for up to 30 days. Class: Normal  
 Pharmacy: [address]  Ph #: [phone] Route: Oral  
  
We Performed the Following REFERRAL TO PAIN CLINIC [EMY02 Custom]  Comments:  
 Dr Steve West,  
 [facility], [address] Phone [phone]  
82-year-old female with chronic pain syndrome with contributions from recalcitrant interstitial cystitis and also superimposed fibromyalgia syndrome. Please provide her assessment for consideration of lidocaine infusions versus ketamine infusions. Thank you. FAX 
[phone] Follow-up Instructions Return in about 4 weeks (around 9/25/2018) for 30 min. To-Do List   
 08/28/2018 ECG:  EKG, 12 LEAD, INITIAL Referral Information Referral ID Referred By Referred To  
  
 [id number] Adrian Yarielpaty SUNDEEP Not Available Visits Status Start Date End Date 1 New Request 8/28/18 8/28/19 If your referral has a status of pending review or denied, additional information will be sent to support the outcome of this decision. Patient Instructions Learning About Sleeping Well What does sleeping well mean? Sleeping well means getting enough sleep. How much sleep is enough varies among people. The number of hours you sleep is not as important as how you feel when you wake up. If you do not feel refreshed, you probably need more sleep. Another sign of not getting enough sleep is feeling tired during the day. The average total nightly sleep time is 7½ to 8 hours. Healthy adults may need a little more or a little less than this. Why is getting enough sleep important? Getting enough quality sleep is a basic part of good health. When your sleep suffers, your mood and your thoughts can suffer too. You may find yourself feeling more grumpy or stressed. Not getting enough sleep also can lead to serious problems, including injury, accidents, anxiety, and depression. What might cause poor sleeping? Many things can cause sleep problems, including: · Stress. Stress can be caused by fear about a single event, such as giving a speech. Or you may have ongoing stress, such as worry about work or school. · Depression, anxiety, and other mental or emotional conditions. · Changes in your sleep habits or surroundings. This includes changes that happen where you sleep, such as noise, light, or sleeping in a different bed. It also includes changes in your sleep pattern, such as having jet lag or working a late shift. · Health problems, such as pain, breathing problems, and restless legs syndrome. · Lack of regular exercise. How can you help yourself? Here are some tips that may help you sleep more soundly and wake up feeling more refreshed. Your sleeping area · Use your bedroom only for sleeping and sex. A bit of light reading may help you fall asleep. But if it doesn't, do your reading elsewhere in the house. Don't watch TV in bed. · Be sure your bed is big enough to stretch out comfortably, especially if you have a sleep partner. · Keep your bedroom quiet, dark, and cool. Use curtains, blinds, or a sleep mask to block out light. To block out noise, use earplugs, soothing music, or a \"white noise\" machine. Your evening and bedtime routine · Create a relaxing bedtime routine. You might want to take a warm shower or bath, listen to soothing music, or drink a cup of noncaffeinated tea. · Go to bed at the same time every night. And get up at the same time every morning, even if you feel tired. What to avoid · Limit caffeine (coffee, tea, caffeinated sodas) during the day, and don't have any for at least 4 to 6 hours before bedtime. · Don't drink alcohol before bedtime. Alcohol can cause you to wake up more often during the night. · Don't smoke or use tobacco, especially in the evening. Nicotine can keep you awake. · Don't take naps during the day, especially close to bedtime. · Don't lie in bed awake for too long. If you can't fall asleep, or if you wake up in the middle of the night and can't get back to sleep within 15 minutes or so, get out of bed and go to another room until you feel sleepy. · Don't take medicine right before bed that may keep you awake or make you feel hyper or energized. Your doctor can tell you if your medicine may do this and if you can take it earlier in the day. If you can't sleep · Imagine yourself in a peaceful, pleasant scene. Focus on the details and feelings of being in a place that is relaxing. · Get up and do a quiet or boring activity until you feel sleepy. · Don't drink any liquids after 6 p.m. if you wake up often because you have to go to the bathroom. Where can you learn more? Go to http://santos-tuan.info/. Enter R236 in the search box to learn more about \"Learning About Sleeping Well. \" Current as of: December 7, 2017 Content Version: 11.7 © 4781-3104 [employer], Incorporated. Care instructions adapted under license by [employer] (which disclaims liability or warranty for this information). If you have questions about a medical condition or this instruction, always ask your healthcare professional. [address] [address] any warranty or liability for your use of this information. Introducing [facility]! Dear Drake Situ: Thank you for requesting a Jo[employer] account. Our records indicate that you already have an active Joinity account. You can access your account anytime at [email or web address] Did you know that you can access your hospital and ER discharge instructions at any time in Joinity? You can also review all of your test results from your hospital stay or ER visit. Additional Information If you have questions, please visit the Frequently Asked Questions section of the Joinity website at [email or web address] Remember, Joinity is NOT to be used for urgent needs. For medical emergencies, dial 911. Now available from your iPhone and Android! Please provide this summary of care documentation to your next provider. Your primary care clinician is listed as Anderson García. If you have any questions after today's visit, please call [phone].

## 2018-09-11 ENCOUNTER — HOSPITAL ENCOUNTER (OUTPATIENT)
Dept: NON INVASIVE DIAGNOSTICS | Age: 43
Discharge: HOME | End: 2018-09-11
Attending: PHYSICAL MEDICINE & REHABILITATION
Payer: COMMERCIAL

## 2018-09-11 DIAGNOSIS — Z79.899 ENCOUNTER FOR LONG-TERM (CURRENT) USE OF HIGH-RISK MEDICATION: ICD-10-CM

## 2018-09-11 LAB
ATRIAL RATE: 99 BPM
CALCULATED P AXIS, ECG09: 77 DEGREES
CALCULATED R AXIS, ECG10: 75 DEGREES
CALCULATED T AXIS, ECG11: -34 DEGREES
DIAGNOSIS, 93000: NORMAL
P-R INTERVAL, ECG05: 122 MS
Q-T INTERVAL, ECG07: 360 MS
QRS DURATION, ECG06: 68 MS
QTC CALCULATION (BEZET), ECG08: 462 MS
VENTRICULAR RATE, ECG03: 99 BPM

## 2018-09-11 PROCEDURE — 93005 ELECTROCARDIOGRAM TRACING: CPT

## 2018-09-17 ENCOUNTER — OFFICE VISIT (OUTPATIENT)
Dept: PAIN MANAGEMENT | Age: 43
End: 2018-09-17

## 2018-09-17 VITALS
DIASTOLIC BLOOD PRESSURE: 71 MMHG | TEMPERATURE: 99.9 F | SYSTOLIC BLOOD PRESSURE: 108 MMHG | WEIGHT: 130 LBS | HEART RATE: 104 BPM | BODY MASS INDEX: 24.55 KG/M2 | RESPIRATION RATE: 16 BRPM | HEIGHT: 61 IN

## 2018-09-17 DIAGNOSIS — M79.10 MYALGIA: ICD-10-CM

## 2018-09-17 DIAGNOSIS — R10.84 GENERALIZED ABDOMINAL PAIN: ICD-10-CM

## 2018-09-17 DIAGNOSIS — Z79.899 ENCOUNTER FOR LONG-TERM (CURRENT) USE OF HIGH-RISK MEDICATION: ICD-10-CM

## 2018-09-17 DIAGNOSIS — M62.838 MUSCLE SPASM: ICD-10-CM

## 2018-09-17 DIAGNOSIS — G89.4 CHRONIC PAIN SYNDROME: Primary | ICD-10-CM

## 2018-09-17 DIAGNOSIS — N30.10 INTERSTITIAL CYSTITIS: ICD-10-CM

## 2018-09-17 RX ORDER — BACLOFEN 10 MG/1
TABLET ORAL
Qty: 90 TAB | Refills: 3 | Status: SHIPPED | OUTPATIENT
Start: 2018-09-17 | End: 2018-09-17 | Stop reason: SDUPTHER

## 2018-09-17 RX ORDER — HYDROMORPHONE HYDROCHLORIDE 8 MG/1
8 TABLET ORAL
Qty: 90 TAB | Refills: 0 | Status: SHIPPED | OUTPATIENT
Start: 2018-10-15 | End: 2018-09-17 | Stop reason: SDUPTHER

## 2018-09-17 RX ORDER — METHADONE HYDROCHLORIDE 10 MG/1
10 TABLET ORAL 2 TIMES DAILY
Qty: 60 TAB | Refills: 0 | Status: SHIPPED | OUTPATIENT
Start: 2018-10-15 | End: 2018-09-17 | Stop reason: SDUPTHER

## 2018-09-17 RX ORDER — BACLOFEN 10 MG/1
TABLET ORAL
Qty: 130 TAB | Refills: 3 | Status: SHIPPED | OUTPATIENT
Start: 2018-10-03 | End: 2019-02-06 | Stop reason: SDUPTHER

## 2018-09-17 RX ORDER — DULOXETIN HYDROCHLORIDE 60 MG/1
60 CAPSULE, DELAYED RELEASE ORAL 2 TIMES DAILY
Qty: 60 CAP | Refills: 2 | Status: SHIPPED | OUTPATIENT
Start: 2018-10-11 | End: 2019-02-06 | Stop reason: SDUPTHER

## 2018-09-17 RX ORDER — DULOXETIN HYDROCHLORIDE 60 MG/1
60 CAPSULE, DELAYED RELEASE ORAL 2 TIMES DAILY
COMMUNITY
End: 2018-09-17 | Stop reason: SDUPTHER

## 2018-09-17 RX ORDER — METHADONE HYDROCHLORIDE 10 MG/1
10 TABLET ORAL 2 TIMES DAILY
Qty: 60 TAB | Refills: 0 | Status: SHIPPED | OUTPATIENT
Start: 2018-11-15 | End: 2018-11-05 | Stop reason: CLARIF

## 2018-09-17 RX ORDER — HYDROMORPHONE HYDROCHLORIDE 8 MG/1
8 TABLET ORAL
Qty: 90 TAB | Refills: 0 | Status: SHIPPED | OUTPATIENT
Start: 2018-11-15 | End: 2018-11-05 | Stop reason: SDUPTHER

## 2018-09-17 NOTE — PROGRESS NOTES
Nursing Notes    Patient presents to the office today in follow-up. Patient rates her pain at 5/10 on the numerical pain scale. Reviewed medications with counts as follows:    Rx Date filled Qty Dispensed Pill Count Last Dose Short   Methadone 10 mg  09/15/18 60 59 today no   Dilaudid 8 mg 09/15/18 90 86 today no                              Last opioid agreement - 01/09/18  Last urine drug screen 07/03/18    Comments:     POC UDS was not performed in office today    Any new labs or imaging since last appointment? NO    Have you been to an emergency room (ER) or urgent care clinic since your last visit? NO            Have you been hospitalized since your last visit? NO     If yes, where, when, and reason for visit? Have you seen or consulted any other health care providers outside of the [address] [place] [address]  since your last visit? NO     If yes, where, when, and reason for visit? Ms. Nathan Costa has a reminder for a \"due or due soon\" health maintenance. I have asked that she contact her primary care provider for follow-up on this health maintenance.     PHQ over the last two weeks 9/17/2018   PHQ Not Done -   Little interest or pleasure in doing things -   Feeling down, depressed, irritable, or hopeless -   Total Score PHQ 2 -   Trouble falling or staying asleep, or sleeping too much Nearly every day   Feeling tired or having little energy Nearly every day   Poor appetite, weight loss, or overeating Nearly every day   Feeling bad about yourself - or that you are a failure or have let yourself or your family down More than half the days   Trouble concentrating on things such as school, work, reading, or watching TV Nearly every day   Moving or speaking so slowly that other people could have noticed; or the opposite being so fidgety that others notice Not at all   Thoughts of being better off dead, or hurting yourself in some way Not at all   How difficult have these problems made it for you to do your work, take care of your home and get along with others Somewhat difficult     Provider is aware of the above score. Pt has a referral to psychology but has not yet scheduled an appt due to her insurance.

## 2018-09-17 NOTE — PATIENT INSTRUCTIONS
Recovering From Depression: Care Instructions  Your Care Instructions    Taking good care of yourself is important as you recover from depression. In time, your symptoms will fade as your treatment takes hold. Do not give up. Instead, focus your energy on getting better. Your mood will improve. It just takes some time. Focus on things that can help you feel better, such as being with friends and family, eating well, and getting enough rest. But take things slowly. Do not do too much too soon. You will begin to feel better gradually. Follow-up care is a key part of your treatment and safety. Be sure to make and go to all appointments, and call your doctor if you are having problems. It's also a good idea to know your test results and keep a list of the medicines you take. How can you care for yourself at home? Be realistic  · If you have a large task to do, break it up into smaller steps you can handle, and just do what you can. · You may want to put off important decisions until your depression has lifted. If you have plans that will have a major impact on your life, such as marriage, divorce, or a job change, try to wait a bit. Talk it over with friends and loved ones who can help you look at the overall picture first.  · Reaching out to people for help is important. Do not isolate yourself. Let your family and friends help you. Find someone you can trust and confide in, and talk to that person. · Be patient, and be kind to yourself. Remember that depression is not your fault and is not something you can overcome with willpower alone. Treatment is necessary for depression, just like for any other illness. Feeling better takes time, and your mood will improve little by little. Stay active  · Stay busy and get outside. Take a walk, or try some other light exercise. · Talk with your doctor about an exercise program. Exercise can help with mild depression. · Go to a movie or concert.  Take part in a [employer] activity or other social gathering. Go to a [employer]-RID game. · Ask a friend to have dinner with you. Take care of yourself  · Eat a balanced diet with plenty of fresh fruits and vegetables, whole grains, and lean protein. If you have lost your appetite, eat small snacks rather than large meals. · Avoid drinking alcohol or using illegal drugs. Do not take medicines that have not been prescribed for you. They may interfere with medicines you may be taking for depression, or they may make your depression worse. · Take your medicines exactly as they are prescribed. You may start to feel better within 1 to 3 weeks of taking antidepressant medicine. But it can take as many as 6 to 8 weeks to see more improvement. If you have questions or concerns about your medicines, or if you do not notice any improvement by 3 weeks, talk to your doctor. · If you have any side effects from your medicine, tell your doctor. Antidepressants can make you feel tired, dizzy, or nervous. Some people have dry mouth, constipation, headaches, sexual problems, or diarrhea. Many of these side effects are mild and will go away on their own after you have been taking the medicine for a few weeks. Some may last longer. Talk to your doctor if side effects are bothering you too much. You might be able to try a different medicine. · Get enough sleep. If you have problems sleeping:  ¨ Go to bed at the same time every night, and get up at the same time every morning. ¨ Keep your bedroom dark and quiet. ¨ Do not exercise after 5:00 p.m. ¨ Avoid drinks with caffeine after 5:00 p.m. · Avoid sleeping pills unless they are prescribed by the doctor treating your depression. Sleeping pills may make you groggy during the day, and they may interact with other medicine you are taking. · If you have any other illnesses, such as diabetes, heart disease, or high blood pressure, make sure to continue with your treatment.  Tell your doctor about all of the medicines you take, including those with or without a prescription. · Keep the numbers for these national suicide hotlines: 9-571-718-TALK (3-438.490.8713) and 7-397-QWJPNAF (1-881.423.6403). If you or someone you know talks about suicide or feeling hopeless, get help right away. When should you call for help? Call 911 anytime you think you may need emergency care. For example, call if:    · You feel like hurting yourself or someone else.     · Someone you know has depression and is about to attempt or is attempting suicide.   [facility] your doctor now or seek immediate medical care if:    · You hear voices.     · Someone you know has depression and:  ¨ Starts to give away his or her possessions. ¨ Uses illegal drugs or drinks alcohol heavily. ¨ Talks or writes about death, including writing suicide notes or talking about guns, knives, or pills. ¨ Starts to spend a lot of time alone. ¨ Acts very aggressively or suddenly appears calm.    Watch closely for changes in your health, and be sure to contact your doctor if:    · You do not get better as expected. Where can you learn more? Go to http://santosZumobituan.info/. Enter P405 in the search box to learn more about \"Recovering From Depression: Care Instructions. \"  Current as of: December 7, 2017  Content Version: 11.7  © 2133-3812 [employer], Incorporated. Care instructions adapted under license by [employer] (which disclaims liability or warranty for this information). If you have questions about a medical condition or this instruction, always ask your healthcare professional. [address] [address] any warranty or liability for your use of this information. Learning About Sleeping Well  What does sleeping well mean? Sleeping well means getting enough sleep. How much sleep is enough varies among people. The number of hours you sleep is not as important as how you feel when you wake up.  If you do not feel refreshed, you probably need more sleep. Another sign of not getting enough sleep is feeling tired during the day. The average total nightly sleep time is 7½ to 8 hours. Healthy adults may need a little more or a little less than this. Why is getting enough sleep important? Getting enough quality sleep is a basic part of good health. When your sleep suffers, your mood and your thoughts can suffer too. You may find yourself feeling more grumpy or stressed. Not getting enough sleep also can lead to serious problems, including injury, accidents, anxiety, and depression. What might cause poor sleeping? Many things can cause sleep problems, including:  · Stress. Stress can be caused by fear about a single event, such as giving a speech. Or you may have ongoing stress, such as worry about work or school. · Depression, anxiety, and other mental or emotional conditions. · Changes in your sleep habits or surroundings. This includes changes that happen where you sleep, such as noise, light, or sleeping in a different bed. It also includes changes in your sleep pattern, such as having jet lag or working a late shift. · Health problems, such as pain, breathing problems, and restless legs syndrome. · Lack of regular exercise. How can you help yourself? Here are some tips that may help you sleep more soundly and wake up feeling more refreshed. Your sleeping area  · Use your bedroom only for sleeping and sex. A bit of light reading may help you fall asleep. But if it doesn't, do your reading elsewhere in the house. Don't watch TV in bed. · Be sure your bed is big enough to stretch out comfortably, especially if you have a sleep partner. · Keep your bedroom quiet, dark, and cool. Use curtains, blinds, or a sleep mask to block out light. To block out noise, use earplugs, soothing music, or a \"white noise\" machine. Your evening and bedtime routine  · Create a relaxing bedtime routine.  You might want to take a warm shower or bath, listen to soothing music, or drink a cup of noncaffeinated tea. · Go to bed at the same time every night. And get up at the same time every morning, even if you feel tired. What to avoid  · Limit caffeine (coffee, tea, caffeinated sodas) during the day, and don't have any for at least 4 to 6 hours before bedtime. · Don't drink alcohol before bedtime. Alcohol can cause you to wake up more often during the night. · Don't smoke or use tobacco, especially in the evening. Nicotine can keep you awake. · Don't take naps during the day, especially close to bedtime. · Don't lie in bed awake for too long. If you can't fall asleep, or if you wake up in the middle of the night and can't get back to sleep within 15 minutes or so, get out of bed and go to another room until you feel sleepy. · Don't take medicine right before bed that may keep you awake or make you feel hyper or energized. Your doctor can tell you if your medicine may do this and if you can take it earlier in the day. If you can't sleep  · Imagine yourself in a peaceful, pleasant scene. Focus on the details and feelings of being in a place that is relaxing. · Get up and do a quiet or boring activity until you feel sleepy. · Don't drink any liquids after 6 p.m. if you wake up often because you have to go to the bathroom. Where can you learn more? Go to http://santos-tuan.info/. Enter H154 in the search box to learn more about \"Learning About Sleeping Well. \"  Current as of: December 7, 2017  Content Version: 11.7  © 7313-0954 [employer], Incorporated. Care instructions adapted under license by [employer] (which disclaims liability or warranty for this information). If you have questions about a medical condition or this instruction, always ask your healthcare professional. [address] any warranty or liability for your use of this information.

## 2018-09-17 NOTE — MR AVS SNAPSHOT
[address] [facility] [address] 
[phone] Patient: Danuta Stuart MRN: [id number] :1975 Visit Information Date & Time Provider Department Dept. Phone Encounter #  
 2018  8:30 AM Maikel Jasmine [address] [facility] 21  Follow-up Instructions Return in about 7 days (around 2018) for 60 min. Your Appointments 2018  8:15 AM  
[facility] [address] [facility] (LORNA SCHEDULING) 30 [facility] [address] [phone] [facility] 5148 [id number] Upcoming Health Maintenance Date Due DTaP/Tdap/Td series (1 - Tdap) 1998 PAP AKA CERVICAL CYTOLOGY 1998 Influenza Age 5 to Adult 2018 Allergies as of 2018  Review Complete On: 2018 By: Tawana Davis DO Severity Noted Reaction Type Reactions Augmentin [Amoxicillin-pot Clavulanate] High 2013    Anaphylaxis Amoxicillin  2013   Topical Hives, Itching Itching of throat Ciprofloxacin  2013   Topical Other (comments) Redness of arm above IV site Darvocet A500 [Propoxyphene N-acetaminophen]  2013   Topical Hives Macrobid [Nitrofurantoin Monohyd/m-cryst]  2013   Side Effect Hives Nubain [Nalbuphine]  2013   Topical Hives, Other (comments) Muscle spasms Phenergan [Promethazine]  2016    Other (comments) Makes me feel like Im on fire from the inside out and causes involuntary muscle spasms. Toradol [Ketorolac Tromethamine]  2016    Itching Vibramycin [Doxycycline Calcium]  2013   Topical Hives, Itching Itching of the throat Current Immunizations  Never Reviewed No immunizations on file. Not reviewed this visit You Were Diagnosed With   
  
 Codes Comments Chronic pain syndrome    -  Primary ICD-10-CM: G89.4 ICD-9-CM: 338.4 Myalgia     ICD-10-CM: M79.1 ICD-9-CM: 729.1 Muscle spasm     ICD-10-CM: K59.425 ICD-9-CM: 728.85 Encounter for long-term (current) use of high-risk medication     ICD-10-CM: Z79.899 ICD-9-CM: V58.69 Interstitial cystitis     ICD-10-CM: N30.10 ICD-9-CM: 595.1 Generalized abdominal pain     ICD-10-CM: R10.84 ICD-9-CM: 789.07 Vitals BP Pulse Temp Resp Height(growth percentile) Weight(growth percentile) 108/71 (BP 1 Location: Left arm, BP Patient Position: Sitting) (!) 104 99.9 °F (37.7 °C) (Oral) 16 5' 1\" (1.549 m) 130 lb (59 kg) BMI OB Status Smoking Status 24.56 kg/m2 Hysterectomy Former Smoker Vitals History BMI and BSA Data Body Mass Index Body Surface Area 24.56 kg/m 2 1.59 m 2 Preferred Pharmacy Pharmacy Name Phone [address] [facility]. [address] [phone] Your Updated Medication List  
  
   
This list is accurate as of 9/17/18  9:52 AM.  Always use your most recent med list.  
  
  
  
  
 baclofen 10 mg tablet Commonly known as:  LIORESAL  
1 or 2 tablets as needed up to 3 times daily. Maximum 5 tablets daily. Start taking on:  10/3/2018 BENADRYL 25 mg capsule Generic drug:  diphenhydrAMINE Take 50 mg by mouth two (2) times daily as needed. docusate sodium 100 mg capsule Commonly known as:  Erika Emily Take 1 Cap by mouth two (2) times daily as needed for Constipation for up to 90 days. DULoxetine 60 mg capsule Commonly known as:  CYMBALTA Take 1 Cap by mouth two (2) times a day. Start taking on:  10/11/2018 EPINEPHrine 0.3 mg/0.3 mL (1:1,000) injection Commonly known as:  AUVI-Q  
0.3 mL by IntraMUSCular route once as needed for Other (severe allergic reaction ) for 1 dose. HYDROmorphone 8 mg tablet Commonly known as:  DILAUDID  
 Take 1 Tab by mouth three (3) times daily as needed for Pain for up to 30 days. Max Daily Amount: 24 mg. Indications: Pain Start taking on:  10/15/2018  
  
 methadone 10 mg tablet Commonly known as:  DOLOPHINE Take 1 Tab by mouth two (2) times a day for 30 days. Max Daily Amount: 20 mg. Indications: Chronic Pain, Severe Pain Start taking on:  10/15/2018 NARCAN IJ  
1 Dose by Injection route as needed. For accidental overdose  
  
 ondansetron 4 mg disintegrating tablet Commonly known as:  ZOFRAN ODT Take 1-2 tablets every 6-8 hours as needed for nausea and vomiting.  
  
 senna 8.6 mg tablet Commonly known as:  Senna Take 1 Tab by mouth daily as needed for Constipation. Prescriptions Printed Refills  
 methadone (DOLOPHINE) 10 mg tablet 0 Starting on: 10/15/2018 Sig: Take 1 Tab by mouth two (2) times a day for 30 days. Max Daily Amount: 20 mg. Indications: Chronic Pain, Severe Pain Class: Print Route: Oral  
 HYDROmorphone (DILAUDID) 8 mg tablet 0 Starting on: 10/15/2018 Sig: Take 1 Tab by mouth three (3) times daily as needed for Pain for up to 30 days. Max Daily Amount: 24 mg. Indications: Pain Class: Print Route: Oral  
  
Prescriptions Sent to Pharmacy Refills DULoxetine (CYMBALTA) 60 mg capsule 2 Starting on: 10/11/2018 Sig: Take 1 Cap by mouth two (2) times a day. Class: Normal  
 [facility] [address]   Ph #: [phone] Route: Oral  
 baclofen (LIORESAL) 10 mg tablet 3 Starting on: 10/3/2018 Si or 2 tablets as needed up to 3 times daily. Maximum 5 tablets daily. Class: Normal  
 Pharmacy: [address]  Ph #: [phone] We Performed the Following REFERRAL TO NEUROLOGY [EUF18 Custom]  Comments:  
 44-year-old female with history of chronic pain secondary to interstitial cystitis, fibromyalgia, others. Also with history of syncope at different times through the years of unknown etiology. Patient presents with worsening muscle tremors, increased frequency of mild clonus, hyperreflexia with positive bilateral Catie's. Please evaluate for the hyperreflexia, tremor, myoclonus. Thank you. Follow-up Instructions Return in about 7 days (around 9/24/2018) for 60 min. Referral Information Referral ID Referred By Referred To  
  
 [id number] [place], [address] [facility]   
   [address] [facility] [address] [place], [address] Phone: [phone] Fax: [phone] Visits Status Start Date End Date 1 New Request 9/17/18 9/17/19 If your referral has a status of pending review or denied, additional information will be sent to support the outcome of this decision. Patient Instructions Recovering From Depression: Care Instructions Your Care Instructions Taking good care of yourself is important as you recover from depression. In time, your symptoms will fade as your treatment takes hold. Do not give up. Instead, focus your energy on getting better. Your mood will improve. It just takes some time. Focus on things that can help you feel better, such as being with friends and family, eating well, and getting enough rest. But take things slowly. Do not do too much too soon. You will begin to feel better gradually. Follow-up care is a key part of your treatment and safety. Be sure to make and go to all appointments, and call your doctor if you are having problems. It's also a good idea to know your test results and keep a list of the medicines you take. How can you care for yourself at home? Be realistic · If you have a large task to do, break it up into smaller steps you can handle, and just do what you can.  
· You may want to put off important decisions until your depression has lifted. If you have plans that will have a major impact on your life, such as marriage, divorce, or a job change, try to wait a bit. Talk it over with friends and loved ones who can help you look at the overall picture first. 
· Reaching out to people for help is important. Do not isolate yourself. Let your family and friends help you. Find someone you can trust and confide in, and talk to that person. · Be patient, and be kind to yourself. Remember that depression is not your fault and is not something you can overcome with willpower alone. Treatment is necessary for depression, just like for any other illness. Feeling better takes time, and your mood will improve little by little. Stay active · Stay busy and get outside. Take a walk, or try some other light exercise. · Talk with your doctor about an exercise program. Exercise can help with mild depression. · Go to a movie or concert. Take part in a Zoroastrian activity or other social gathering. Go to a ball game. · Ask a friend to have dinner with you. Take care of yourself · Eat a balanced diet with plenty of fresh fruits and vegetables, whole grains, and lean protein. If you have lost your appetite, eat small snacks rather than large meals. · Avoid drinking alcohol or using illegal drugs. Do not take medicines that have not been prescribed for you. They may interfere with medicines you may be taking for depression, or they may make your depression worse. · Take your medicines exactly as they are prescribed. You may start to feel better within 1 to 3 weeks of taking antidepressant medicine. But it can take as many as 6 to 8 weeks to see more improvement. If you have questions or concerns about your medicines, or if you do not notice any improvement by 3 weeks, talk to your doctor. · If you have any side effects from your medicine, tell your doctor. Antidepressants can make you feel tired, dizzy, or nervous.  Some people have dry mouth, constipation, headaches, sexual problems, or diarrhea. Many of these side effects are mild and will go away on their own after you have been taking the medicine for a few weeks. Some may last longer. Talk to your doctor if side effects are bothering you too much. You might be able to try a different medicine. · Get enough sleep. If you have problems sleeping: ¨ Go to bed at the same time every night, and get up at the same time every morning. ¨ Keep your bedroom dark and quiet. ¨ Do not exercise after 5:00 p.m. ¨ Avoid drinks with caffeine after 5:00 p.m. · Avoid sleeping pills unless they are prescribed by the doctor treating your depression. Sleeping pills may make you groggy during the day, and they may interact with other medicine you are taking. · If you have any other illnesses, such as diabetes, heart disease, or high blood pressure, make sure to continue with your treatment. Tell your doctor about all of the medicines you take, including those with or without a prescription. · Keep the numbers for these national suicide hotlines: 6-202-381-TALK (0-565.458.4804) and 1-811-JNPSMQT (9-897.128.7510). If you or someone you know talks about suicide or feeling hopeless, get help right away. When should you call for help? Call 911 anytime you think you may need emergency care. For example, call if: 
  · You feel like hurting yourself or someone else.  
  · Someone you know has depression and is about to attempt or is attempting suicide.  
[facility] your doctor now or seek immediate medical care if: 
  · You hear voices.  
  · Someone you know has depression and: 
¨ Starts to give away his or her possessions. ¨ Uses illegal drugs or drinks alcohol heavily. ¨ Talks or writes about death, including writing suicide notes or talking about guns, knives, or pills. ¨ Starts to spend a lot of time alone. ¨ Acts very aggressively or suddenly appears calm.  Watch closely for changes in your health, and be sure to contact your doctor if: 
  · You do not get better as expected. Where can you learn more? Go to http://santos-tuan.info/. Enter F803 in the search box to learn more about \"Recovering From Depression: Care Instructions. \" Current as of: December 7, 2017 Content Version: 11.7 © 7801-4194 [employer]. Care instructions adapted under license by [employer] (which disclaims liability or warranty for this information). If you have questions about a medical condition or this instruction, always ask your healthcare professional. [address] any warranty or liability for your use of this information. Learning About Sleeping Well What does sleeping well mean? Sleeping well means getting enough sleep. How much sleep is enough varies among people. The number of hours you sleep is not as important as how you feel when you wake up. If you do not feel refreshed, you probably need more sleep. Another sign of not getting enough sleep is feeling tired during the day. The average total nightly sleep time is 7½ to 8 hours. Healthy adults may need a little more or a little less than this. Why is getting enough sleep important? Getting enough quality sleep is a basic part of good health. When your sleep suffers, your mood and your thoughts can suffer too. You may find yourself feeling more grumpy or stressed. Not getting enough sleep also can lead to serious problems, including injury, accidents, anxiety, and depression. What might cause poor sleeping? Many things can cause sleep problems, including: · Stress. Stress can be caused by fear about a single event, such as giving a speech. Or you may have ongoing stress, such as worry about work or school. · Depression, anxiety, and other mental or emotional conditions. · Changes in your sleep habits or surroundings.  This includes changes that happen where you sleep, such as noise, light, or sleeping in a different bed. It also includes changes in your sleep pattern, such as having jet lag or working a late shift. · Health problems, such as pain, breathing problems, and restless legs syndrome. · Lack of regular exercise. How can you help yourself? Here are some tips that may help you sleep more soundly and wake up feeling more refreshed. Your sleeping area · Use your bedroom only for sleeping and sex. A bit of light reading may help you fall asleep. But if it doesn't, do your reading elsewhere in the house. Don't watch TV in bed. · Be sure your bed is big enough to stretch out comfortably, especially if you have a sleep partner. · Keep your bedroom quiet, dark, and cool. Use curtains, blinds, or a sleep mask to block out light. To block out noise, use earplugs, soothing music, or a \"white noise\" machine. Your evening and bedtime routine · Create a relaxing bedtime routine. You might want to take a warm shower or bath, listen to soothing music, or drink a cup of noncaffeinated tea. · Go to bed at the same time every night. And get up at the same time every morning, even if you feel tired. What to avoid · Limit caffeine (coffee, tea, caffeinated sodas) during the day, and don't have any for at least 4 to 6 hours before bedtime. · Don't drink alcohol before bedtime. Alcohol can cause you to wake up more often during the night. · Don't smoke or use tobacco, especially in the evening. Nicotine can keep you awake. · Don't take naps during the day, especially close to bedtime. · Don't lie in bed awake for too long. If you can't fall asleep, or if you wake up in the middle of the night and can't get back to sleep within 15 minutes or so, get out of bed and go to another room until you feel sleepy. · Don't take medicine right before bed that may keep you awake or make you feel hyper or energized.  Your doctor can tell you if your medicine may do this and if you can take it earlier in the day. If you can't sleep · Imagine yourself in a peaceful, pleasant scene. Focus on the details and feelings of being in a place that is relaxing. · Get up and do a quiet or boring activity until you feel sleepy. · Don't drink any liquids after 6 p.m. if you wake up often because you have to go to the bathroom. Where can you learn more? Go to http://santos-tuan.info/. Enter N773 in the search box to learn more about \"Learning About Sleeping Well. \" Current as of: December 7, 2017 Content Version: 11.7 © 8770-2982 [employer]. Care instructions adapted under license by [employer] (which disclaims liability or warranty for this information). If you have questions about a medical condition or this instruction, always ask your healthcare professional. [place] [address] any warranty or liability for your use of this information. Introducing hospitals & HEALTH SERVICES! Dear Андрей Ivan: Thank you for requesting a Swipp account. Our records indicate that you already have an active Swipp account. You can access your account anytime at [email or web address] Did you know that you can access your hospital and ER discharge instructions at any time in Swipp? You can also review all of your test results from your hospital stay or ER visit. Additional Information If you have questions, please visit the Frequently Asked Questions section of the Swipp website at [email or web address] Remember, Swipp is NOT to be used for urgent needs. For medical emergencies, dial 911. Now available from your iPhone and Android! Please provide this summary of care documentation to your next provider. Your primary care clinician is listed as Brigido Silverman. If you have any questions after today's visit, please call [phone].

## 2018-09-17 NOTE — PROGRESS NOTES
Social History     Social History    Marital status:      Spouse name: N/A    Number of children: N/A    Years of education: N/A     Occupational History    Not on file. Social History Main Topics    Smoking status: Former Smoker     Packs/day: 0.50     Years: 10.00    Smokeless tobacco: Never Used    Alcohol use Yes      Comment: 1 every 2 months    Drug use: No    Sexual activity: Not on file      Comment: hysterectomy     Other Topics Concern    Not on file     Social History Narrative     Family History   Problem Relation Age of Onset    Post-op Nausea/Vomiting Sister     Malignant Hyperthermia Neg Hx     Pseudocholinesterase Deficiency Neg Hx     Delayed Awakening Neg Hx     Emergence Delirium Neg Hx     Post-op Cognitive Dysfunction Neg Hx      Allergies   Allergen Reactions    Augmentin [Amoxicillin-Pot Clavulanate] Anaphylaxis    Amoxicillin Hives and Itching     Itching of throat    Ciprofloxacin Other (comments)     Redness of arm above IV site    Darvocet A500 [Propoxyphene N-Acetaminophen] Hives    Macrobid [Nitrofurantoin Monohyd/M-Cryst] Hives    Nubain [Nalbuphine] Hives and Other (comments)     Muscle spasms    Phenergan [Promethazine] Other (comments)     Makes me feel like Im on fire from the inside out and causes involuntary muscle spasms.      Toradol [Ketorolac Tromethamine] Itching    Vibramycin [Doxycycline Calcium] Hives and Itching     Itching of the throat     Past Medical History:   Diagnosis Date    Anxiety     Arthritis     osteopenia    Chronic kidney disease     kidney stones    Chronic pain     bladder    Depression     Fibromyalgia     Interstitial cystitis     Nausea & vomiting     Other ill-defined conditions(799.89)     chronic interstitual cyctitis    Other ill-defined conditions(799.89)     endometriosis    Psychiatric disorder      Past Surgical History:   Procedure Laterality Date    HX APPENDECTOMY      HX  SECTION  2004    HX HEENT      oral surgery wisdom teeth extractiion    HX HYSTERECTOMY  2004    [facility]    HX PELVIC LAPAROSCOPY      x 5    HX UROLOGICAL      cysto and hydrodistention x 4    HX UROLOGICAL      Interstim implant    HX UROLOGICAL  08/2013    revision interstim     Current Outpatient Prescriptions on File Prior to Visit   Medication Sig    diphenhydrAMINE (BENADRYL) 25 mg capsule Take 50 mg by mouth two (2) times daily as needed.  senna (SENNA) 8.6 mg tablet Take 1 Tab by mouth daily as needed for Constipation.  docusate sodium (COLACE) 100 mg capsule Take 1 Cap by mouth two (2) times daily as needed for Constipation for up to 90 days.  ondansetron (ZOFRAN ODT) 4 mg disintegrating tablet Take 1-2 tablets every 6-8 hours as needed for nausea and vomiting.  NALOXONE HCL (NARCAN IJ) 1 Dose by Injection route as needed. For accidental overdose    EPINEPHrine (AUVI-Q) 0.3 mg/0.3 mL (1:1,000) injection 0.3 mL by IntraMUSCular route once as needed for Other (severe allergic reaction ) for 1 dose. No current facility-administered medications on file prior to visit. HPI:  Jonelle Robles is a 41 y.o. female here for f/u visit for ongoing evaluation of interstitial cystitis. Pt was last seen here on 8/28/18  . Pt denies interval changes in the character or distribution of pain although she is reporting more intensity. She is also reporting more frequent muscle tremors with activity and also waking at night from what is described as a mild sudden clonus activity that is new since restarting her Flexeril last visit. Pain is located across the lower anterior abdomen, is an aching and stabbing pain that ranges from 4-10/10. She also has widespread body pain including aching and stabbing pain across the lumbosacral belt line left worse than right. Aching pain at bilateral knees ankles and feet.   Aching stabbing pain in the lower and bilateral upper trapezius region cervical region. Chronic bilateral wrist pain with history of right carpal tunnel syndrome and left tendinitis. Last visit she was referred for infusion therapies of lidocaine or ketamine which she has not been unable to initiate secondary to the high cost of these types of treatments. Lyrica and gabapentin not tolerated secondary to significant and rapid weight gain. Duloxetine 60 mg twice daily has been beneficial for depression and for pain. .   Baclofen 10 mg up to 3 times daily. Flexeril 5 mg up to twice daily. Has been reporting strong muscle twitching, almost myoclonus, in all limbs. This only started since restarting the flexeril although she has never had this before. Dilaudid 8 mg up to 3 times daily as needed with 90 tablets for 30 days. [id number] mg twice daily. She reports partial but incomplete pain relief with her current opioid regimen without significant side effects. She states that her current opioid regimen allows her to have increased activity tolerance throughout the day. She denies aberrant behaviors with the opioids. ROS: Negative for fever, chills, vomiting, diarrhea, constipation, suicidal ideation, homicidal ideation, trouble breathing, chest pain, difficulty swallowing, changes in vision, changes in hearing, focal weakness, bowel incontinence, bladder incontinence. Review of systems positive for depression and anxiety    Opioid specific risk: Panic disorder, anxiety, depression, constipation    Opioid specific history: Patient has been on continuous opioid therapy for approximately 6 years. She has not had symptoms of withdrawal in the past.  She expresses a fear of developing withdrawal symptoms. She states that she would like to be opioid free but the fear of withdrawal is another barrier that must be overcome.     Vitals:    09/17/18 0809   BP: 108/71   Pulse: (!) 104   Resp: 16   Temp: 99.9 °F (37.7 °C)   TempSrc: Oral   Weight: 59 kg (130 lb)   Height: 5' 1\" (1.549 m) PainSc:   5        EKG: from 9/11/18  QT - 360  QTc - 462      PE:  no acute distress, normal body habitus. A&OXs 3.  normocephalic, atraumatic. Conjugate gaze, clear sclerae. Mottling skin left lumbar 2 left lower abdominal region  Tender to palpation left lumbar paraspinals left sacroiliac joint region bilateral piriformis region. No hand atrophy noted bilat. Full active range of motion for lumbar flexion and extension. Strength for right upper extremity is 5/5 for shoulder abduction, elbow flexion, wrist extension, elbow extension, finger abduction, 4/5 flexor digitorum profundus to digits 2 through 5. Strength for left upper extremity is 5/5 for shoulder abduction, elbow flexion, wrist extension, elbow extension, finger abduction, flexor digitorum profundus to digits 2 through 5. Strength for right lower extremity is 5/5 for hip flexion, knee extension, dorsiflexion, extensor hallucis longus, plantar flexion. Strength for left lower extremity is 5/5 for hip flexion, knee extension, dorsiflexion, extensor hallucis longus, plantar flexion. Muscle stretch reflexes for right upper extremity are 3+ for C5, C6, C7. Muscle stretch reflexes for left upper extremity are 3+ for C5, C6, C7. Muscle Stretch reflexes for right lower extremity are 3+ for L4, L5 S1. Muscle Stretch reflexes for left  lower extremity are 3+ for L4, L5, S1.   Negative ankle clonus on the right and the left. Negative Hoffmans on the right and the left. Negative seated straight leg raise bilaterally. Negative FABERs on the right and the left. Negative Stinchfield's on the right and a left. Negative FADIRS on the right and the left. Gait is within functional limits. Balance is within functional limits. Sensation to light touch is intact Except increased at right C3, C4, C8 and left C5, increased at left L3, abnormal at right L4, increased left S2, increased left L5.         Calculated MEQ - 201>>186>>156  Naloxone rescue -yes  Prophylactic bowel program -yes  Date of last OCA January 2018. Last UDS July 3, 2018  , date checked today, findings consistent    [address]  [address] [address] [place] [address]  [phone]      GIC-5 and 3    TEE -66%    COMM-15    PHQ -- 9 -- 4 and somewhat difficult. Ray Vergara PHQ over the last two weeks 9/17/2018   PHQ -   Little interest or pleasure in doing things -   Feeling down, depressed, irritable, or hopeless -   Total Score PHQ 2 -   Trouble falling or staying asleep, or sleeping too much Nearly every day   Feeling tired or having little energy Nearly every day   Poor appetite, weight loss, or overeating Nearly every day   Feeling bad about yourself - or that you are a failure or have let yourself or your family down More than half the days   Trouble concentrating on things such as school, work, reading, or watching TV Nearly every day   Moving or speaking so slowly that other people could have noticed; or the opposite being so fidgety that others notice Not at all   Thoughts of being better off dead, or hurting yourself in some way Not at all   How difficult have these problems made it for you to do your work, take care of your home and get along with others Somewhat difficult     DSM V-OUD Screen--mild    Assessment/Plan:     ICD-10-CM ICD-9-CM    1. Chronic pain syndrome G89.4 338.4 REFERRAL TO NEUROLOGY      baclofen (LIORESAL) 10 mg tablet      methadone (DOLOPHINE) 10 mg tablet      HYDROmorphone (DILAUDID) 8 mg tablet      DISCONTINUED: methadone (DOLOPHINE) 10 mg tablet      DISCONTINUED: baclofen (LIORESAL) 10 mg tablet      DISCONTINUED: HYDROmorphone (DILAUDID) 8 mg tablet   2. Myalgia M79.1 729.1 DULoxetine (CYMBALTA) 60 mg capsule      REFERRAL TO NEUROLOGY      methadone (DOLOPHINE) 10 mg tablet      DISCONTINUED: methadone (DOLOPHINE) 10 mg tablet   3.  Muscle spasm M62.838 728.85 REFERRAL TO NEUROLOGY baclofen (LIORESAL) 10 mg tablet      HYDROmorphone (DILAUDID) 8 mg tablet      DISCONTINUED: baclofen (LIORESAL) 10 mg tablet      DISCONTINUED: HYDROmorphone (DILAUDID) 8 mg tablet   4. Encounter for long-term (current) use of high-risk medication Z79.899 V58.69    5. Interstitial cystitis N30.10 595.1 baclofen (LIORESAL) 10 mg tablet      methadone (DOLOPHINE) 10 mg tablet      HYDROmorphone (DILAUDID) 8 mg tablet      DISCONTINUED: methadone (DOLOPHINE) 10 mg tablet      DISCONTINUED: baclofen (LIORESAL) 10 mg tablet      DISCONTINUED: HYDROmorphone (DILAUDID) 8 mg tablet   6. Generalized abdominal pain R10.84 789.07       --Neurology consult placed secondary to increased muscle tremor, global hyperreflexia with bilateral positive Posada's, subjective reports of increased frequency of myoclonus. --Discontinue Flexeril.  -Change baclofen 10 mg to 1-2 tablets as needed 3 times daily for maximum of 5 tablets daily.  -Patient to reestablish with urology.  -Patient to continue to search for facilities which offer infusion therapies.  -Continue to explore her previous home methocarbamol infusions which were previously ordered through Dr. Hiram Stovall. She previously received methocarbamol 100 mg/mL for a total of 3000 mg IV weekly for 6 weeks. She reported that this was very helpful for her overall widespread pain but that it caused increased headaches. It was recommended that if she repeat the regimen that she separate the infusions by 2 week intervals instead of 1 week interval to try and help mitigate the headaches. This appears to be last ordered on February 23, 2016. --Continue to periodically monitor EKGs. Most recent was done on 9/11/2018  --Pause current opioid wean at the current rate, methadone 10 mg twice daily and also Dilaudid 8 mg up to 3 times daily as needed with 90 tablets for 30 days. --At f/u ask her how she is progressing with Yoga.   --Patient was instructed on how to receive assisted stretches over the right carpal tunnel region and she expressed understanding. --Once methadone has been weaned, consider re-trial of amitriptyline  --On March 7, 2016 with Dr. Esme Ruiz the patient had bilateral superior hypogastric plexus block. Consider repeat if the patient had significant perceived benefit. GOALS:  To establish complementary and integrative plan of care to address chronic pain issues while minimizing pharmaceuticals to maximize patient's function improve quality of life. Education:  Patient again educated on the importance of strict compliance with the opioid care agreement while on opioid therapy. Patient also again educated that they should avoid driving while on chronic opioid therapy. Also advised to avoid alcohol and to avoid benzodiazepines while on opioid therapy. F/u:. Follow-up Disposition:  Return in about 7 weeks (around 11/5/2018) for 60 min.

## 2018-10-01 ENCOUNTER — OFFICE VISIT (OUTPATIENT)
Dept: NEUROLOGY | Age: 43
End: 2018-10-01

## 2018-10-01 VITALS
SYSTOLIC BLOOD PRESSURE: 110 MMHG | OXYGEN SATURATION: 96 % | WEIGHT: 128 LBS | HEART RATE: 108 BPM | DIASTOLIC BLOOD PRESSURE: 76 MMHG | TEMPERATURE: 100.1 F | RESPIRATION RATE: 16 BRPM | BODY MASS INDEX: 24.17 KG/M2 | HEIGHT: 61 IN

## 2018-10-01 DIAGNOSIS — G95.9 MYELOPATHY (HCC): ICD-10-CM

## 2018-10-01 DIAGNOSIS — R29.2 ABNORMAL DTR (DEEP TENDON REFLEX): Primary | ICD-10-CM

## 2018-10-01 DIAGNOSIS — R25.9 ABNORMAL INVOLUNTARY MOVEMENT: ICD-10-CM
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
## 2018-10-01 NOTE — PROGRESS NOTES
Patient was referred by [facility] and is here to see  for muscle spasms.    PHQ over the last two weeks 10/1/2018   PHQ Not Done -   Little interest or pleasure in doing things Several days   Feeling down, depressed, irritable, or hopeless Several days   Total Score PHQ 2 2   Trouble falling or staying asleep, or sleeping too much -   Feeling tired or having little energy -   Poor appetite, weight loss, or overeating -   Feeling bad about yourself - or that you are a failure or have let yourself or your family down -   Trouble concentrating on things such as school, work, reading, or watching TV -   Moving or speaking so slowly that other people could have noticed; or the opposite being so fidgety that others notice -   Thoughts of being better off dead, or hurting yourself in some way -   How difficult have these problems made it for you to do your work, take care of your home and get along with others -

## 2018-10-01 NOTE — PROGRESS NOTES
I have reviewed the documentation provided by the nurse practitioner, Mrs. Vikash Celestin, and we have discussed her findings and the clinical impression. I have formulated with her the proposed management plans for this patient. Additionally,  I have personally evaluated the patient to verify the history and to confirm physical findings. Below are my additional comments:  A 82-year-old woman who has multiple medical issues as noted above but is referred here secondary to an abnormal neurological examination most notably pathologic reflexes in the upper extremities and the hyperreflexia. She denies any cord injury or fall or other injury. Denies any focal neurologic deficit that persisted any point in time. Her examination today does find that she is a bit tangential.  No icterus. No edema. Pulses symmetric. Awake alert and oriented. She has intact cranial nerves II-XII. No afferent pupillary defect. She has full strength in the upper and lower extremities in all muscle groups to direct testing. Her reflexes are quite brisk more so in the lower extremities than the upper is but she is pathologically brisk with spread and clonus at the knees. No clonus at the ankles. She ambulates steadily and she is able to get onto her heels and toes. At this juncture this lady presents a picture of myelopathy and we will proceed with imaging of the cord. She has a Medtronic InterStim stimulator for her bladder and apparently according to the card and the website there is conditional MRI compatibility. We will have our MRI people research that and in the order I specifically put that she has the stimulator so they can investigate that. I really would like to get an MRI of the cervical and thoracic spine as again this could be an anatomic lesion such as mass, disc versus demyelinating lesion versus other. If we cannot get an MRI then we will use CT of the cervical and thoracic as a backup due to the stimulator.   EMG and will do one limb in the upper and one limb in the lower and then as the examination direction to exclude any neuromuscular/motor neuron disease. Given the myoclonus this could again go along with a myelopathic picture being the so-called \"spinal seizure\" of old. Will get an EEG to exclude any epileptiform abnormalities. Follow-up after her testing. Hedy Rodgers MD  This note will not be viewable in [address]

## 2018-10-01 NOTE — MR AVS SNAPSHOT
[address] [facility] 
 
 
 [address] [facility] 
[phone] Patient: Burton Alvarez MRN: [id number] :1975 Visit Information Date & Time Provider Department Dept. Phone Encounter #  
 10/1/2018 10:30 AM Meri Beckwith MD  [address] at [address] [facility] [id number] Follow-up Instructions Return for After tests NP. Your Appointments 10/12/2018  8:40 AM  
EMG with EMG  [address] at [address] [facility]) Appt Note: referred by Dr. Law Sailaja 1 arm, 1 leg and then as exam directs eval for MN disease [address] [facility] [address] [facility] [address] [facility] [address] [facility] [address] [address] [place] [address]  
  
    
 2018  8:30 AM  
Follow Up with Edwin Soulier, DO  [address] [facility] ([facility] SCHEDULING) Appt Note: Return in about 7 WEEKS (around 2018 for 60 min.; Return in about 7 WEEKS (around 2018 for 60 min. [address] [place] [address]  
[phone] [place] [id number] Upcoming Health Maintenance Date Due DTaP/Tdap/Td series (1 - Tdap) 1998 PAP AKA CERVICAL CYTOLOGY 1998 Influenza Age 5 to Adult 2018 Allergies as of 10/1/2018  Review Complete On: 10/1/2018 By: Sherry Rahman NP Severity Noted Reaction Type Reactions Augmentin [Amoxicillin-pot Clavulanate] High 2013    Anaphylaxis Amoxicillin  2013   Topical Hives, Itching Itching of throat Ciprofloxacin  2013   Topical Other (comments) Redness of arm above IV site Darvocet A500 [Propoxyphene N-acetaminophen]  2013   Topical Hives Macrobid [Nitrofurantoin Monohyd/m-cryst]  2013   Side Effect Hives Nubain [Nalbuphine]  2013   Topical Hives, Other (comments) Muscle spasms Phenergan [Promethazine]  11/09/2016    Other (comments) Makes me feel like Im on fire from the inside out and causes involuntary muscle spasms. Toradol [Ketorolac Tromethamine]  01/11/2016    Itching Vibramycin [Doxycycline Calcium]  04/04/2013   Topical Hives, Itching Itching of the throat Current Immunizations  Never Reviewed No immunizations on file. Not reviewed this visit You Were Diagnosed With   
  
 Codes Comments Abnormal DTR (deep tendon reflex)    -  Primary ICD-10-CM: R29.2 ICD-9-CM: 796.1 Myelopathy ([facility] [address]     ICD-10-CM: G95.9 ICD-9-CM: 336.9 Abnormal involuntary movement     ICD-10-CM: R25.9 ICD-9-CM: 885. 0 Vitals BP Pulse Temp Resp Height(growth percentile) Weight(growth percentile) 110/76 (BP 1 Location: Left arm, BP Patient Position: Sitting) (!) 108 100.1 °F (37.8 °C) (Oral) 16 5' 1\" (1.549 m) 128 lb (58.1 kg) SpO2 BMI OB Status Smoking Status 96% 24.19 kg/m2 Hysterectomy Former Smoker Vitals History BMI and BSA Data Body Mass Index Body Surface Area  
 24.19 kg/m 2 1.58 m 2 Preferred Pharmacy Pharmacy Name Phone [address] [facility]. [facility] [address] [place] [address] [phone] Your Updated Medication List  
  
   
This list is accurate as of 10/1/18 12:23 PM.  Always use your most recent med list.  
  
  
  
  
 AZO PO Take  by mouth. baclofen 10 mg tablet Commonly known as:  LIORESAL  
1 or 2 tablets as needed up to 3 times daily. Maximum 5 tablets daily. Start taking on:  10/3/2018 BENADRYL 25 mg capsule Generic drug:  diphenhydrAMINE Take 50 mg by mouth two (2) times daily as needed. docusate sodium 100 mg capsule Commonly known as:  Aditi Berliner Take 1 Cap by mouth two (2) times daily as needed for Constipation for up to 90 days. DULoxetine 60 mg capsule Commonly known as:  CYMBALTA Take 1 Cap by mouth two (2) times a day. Start taking on:  10/11/2018 EPINEPHrine 0.3 mg/0.3 mL (1:1,000) injection Commonly known as:  AUVI-Q  
0.3 mL by IntraMUSCular route once as needed for Other (severe allergic reaction ) for 1 dose. HYDROmorphone 8 mg tablet Commonly known as:  DILAUDID Take 1 Tab by mouth three (3) times daily as needed for Pain for up to 30 days. Max Daily Amount: 24 mg. Indications: Pain Start taking on:  11/15/2018  
  
 methadone 10 mg tablet Commonly known as:  DOLOPHINE Take 1 Tab by mouth two (2) times a day for 30 days. Max Daily Amount: 20 mg. Indications: Chronic Pain, Severe Pain Start taking on:  11/15/2018 NARCAN IJ  
1 Dose by Injection route as needed. For accidental overdose  
  
 senna 8.6 mg tablet Commonly known as:  Senna Take 1 Tab by mouth daily as needed for Constipation. We Performed the Following EMG LIMITED [[id number] Custom] Follow-up Instructions Return for After tests NP. To-Do List   
 10/01/2018 Neurology:  EEG   
  
 10/01/2018 Imaging:  MRI CERV SPINE WO CONT   
  
 10/01/2018 Imaging:  MRI [address] SPINE WO CONT Introducing [facility] HE[facility]! Dear Lu Resendez: Thank you for requesting a [employer] account. Our records indicate that you already have an active [employer] account. You can access your account anytime at [email or web address] Did you know that you can access your hospital and ER discharge instructions at any time in [employer]? You can also review all of your test results from your hospital stay or ER visit. Additional Information If you have questions, please visit the Frequently Asked Questions section of the [employer] website at [email or web address] Remember, [employer] is NOT to be used for urgent needs. For medical emergencies, dial 911. Now available from your iPhone and Android! Please provide this summary of care documentation to your next provider. Your primary care clinician is listed as Carla Lanza. If you have any questions after today's visit, please call [phone].

## 2018-10-04 ENCOUNTER — TELEPHONE (OUTPATIENT)
Dept: NEUROLOGY | Age: 43
End: 2018-10-04

## 2018-10-04 NOTE — TELEPHONE ENCOUNTER
Marg[facility] calling to say that pt is unable to schedule MRI due to the stimulator she has implanted. Must have CT instead. Needs new order.

## 2018-10-05 DIAGNOSIS — R29.2 ABNORMAL DTR (DEEP TENDON REFLEX): Primary | ICD-10-CM

## 2018-10-05 DIAGNOSIS — G95.9 MYELOPATHY (HCC): ICD-10-CM

## 2018-10-05 DIAGNOSIS — R25.9 ABNORMAL INVOLUNTARY MOVEMENTS: ICD-10-CM

## 2018-10-10 DIAGNOSIS — G95.9 MYELOPATHY (HCC): ICD-10-CM

## 2018-10-10 DIAGNOSIS — R29.2 ABNORMAL DTR (DEEP TENDON REFLEX): Primary | ICD-10-CM

## 2018-10-10 DIAGNOSIS — R25.9 ABNORMAL INVOLUNTARY MOVEMENTS: ICD-10-CM

## 2018-10-10 NOTE — TELEPHONE ENCOUNTER
Spoke with patient verified name and , informed patient orders for CT of cervical and thoracic spine in [facility].

## 2018-10-12 ENCOUNTER — OFFICE VISIT (OUTPATIENT)
Dept: NEUROLOGY | Age: 43
End: 2018-10-12

## 2018-10-12 VITALS
SYSTOLIC BLOOD PRESSURE: 103 MMHG | DIASTOLIC BLOOD PRESSURE: 78 MMHG | TEMPERATURE: 99.5 F | WEIGHT: 130 LBS | HEART RATE: 95 BPM | OXYGEN SATURATION: 98 % | RESPIRATION RATE: 18 BRPM | BODY MASS INDEX: 24.55 KG/M2 | HEIGHT: 61 IN

## 2018-10-12 DIAGNOSIS — M62.838 MUSCLE SPASM: Primary | ICD-10-CM

## 2018-10-12 DIAGNOSIS — R20.0 EXTREMITY NUMBNESS: ICD-10-CM

## 2018-10-12 NOTE — PROGRESS NOTES
[facility] [address] [facility] [address]  [facility] [phone]    Neurophysiology Report    Patient: Clem Car     ID: [id number] Physician: Lisa Carolina MD   Gender: Female Ref Phys: Reinaldo Solomon MD   Handedness: Víctor Diop     Study Date: October 12, 2018         Patient History:  Bilateral upper and lower extremity spasms, right hand numbness      Nerve Conduction Studies  Anti Sensory Summary Table     Stim Site NR Peak (ms) Norm Peak (ms) O-P Amp (µV) Norm O-P Amp Dist (cm) Wai (m/s)   Right Median 2nd Digit Anti Sensory (2nd Digit)   Wrist    2.9 <3.5 60.2 >20 13.0 61.9   Right Sup Peron Anti Sensory (Lower Leg)   Ankle    3.6 <4.4 13.1 >6.0 14.0 77.8   Right Sural Anti Sensory (Ankle)   Calf    3.5 <4.4 12.6 >6.0 14.0 53.8   Right Ulnar Anti Sensory (5th Digit )   Wrist    2.7 <3.1 45.7 >17.0 11.0 57.9     Motor Summary Table     Stim Site NR Onset (ms) Norm Onset (ms) O-P Amp (mV) Norm O-P Amp Dist (cm) Wai (m/s) Norm Wai (m/s)   Right Median Motor (Abd Poll Brev)   Wrist    2.8 <4.4 7.0 >4.0 22.0 75.9 >49   Elbow    5.7  5.5       Right Peroneal Motor (EDB)   Ankle    2.7 <6.5 5.5 >2.0 32.0 60.4 >44   Fibula (Head)    8.0  5.2  10.0 125.0    Pop Fossa    8.8  7.7       Right Tibial Motor (Abd Mina Brev)   Ankle    2.7 <5.8 11.5 >4.0 32.0 44.4 >41   Knee    9.9  5.2       Right Ulnar Motor (Abd Dig Minimi )   Wrist    2.0 <3.3 7.7 >6.0 21.0 75.0 >49   B Elbow    4.8  7.5  12.0 63.2 >50   A Elbow    6.7  8.0               EMG     Side Muscle Nerve Root Ins Act Fibs Psw Fasc Amp Dur Poly Recrt Int [place] Hurt Comment   Right Deltoid Axillary C5-6 Nml Nml Nml None Nml Nml 0 Nml Nml    Right Biceps Musculocut C5-6 Nml Nml Nml None Nml Nml 0 Nml Nml    Right Triceps Radial C6-7-8 Nml Nml Nml None Nml Nml 0 Nml Nml    Right PronatorTeres Median C6-7 Nml Nml Nml None Nml Nml 0 Nml Nml    Right FlexCarpiUln Ulnar C8,T1 Nml Nml Nml None Nml Nml 0 Nml Nml Right 1stDorInt Ulnar C8-T1 Nml Nml Nml None Nml Nml 0 Nml Nml    Right AntTibialis Dp Br Fibular L4-5 Nml Nml Nml None Nml Nml 0 Nml Nml    Right MedGastroc   Nml Nml Nml None Nml Nml 0 Nml Nml    Right VastusLat Femoral L2-4 Nml Nml Nml None Nml Nml 0 Nml Nml    Right BicepsFemS Sciatic L5-S1 Nml Nml Nml None Nml Nml 0 Nml Nml    Right VastusMed Femoral L2-4 Nml Nml Nml None Nml Nml 0 Nml Nml      NCS/EMG FINDINGS:     Evaluation of the Right median motor, the Right peroneal motor, the Right tibial motor, the Right ulnar motor, the Right Median 2nd Digit sensory, the Right superficial peroneal sensory, the Right sural sensory, and the Right ulnar sensory nerves were unremarkable. INTERPRETATION:   Normal EMG and NCS of right upper and right lower extremity. ___________________________  Teresa Raymundoast  Jose Beatty MD          Waveforms:                        [id number] [facility]  OFFICE PROCEDURE PROGRESS NOTE        Chart reviewed for the following:   Keiry Johnson MD, have reviewed the History, Physical and updated the Allergic reactions for [address] performed immediately prior to start of procedure:   Keiry Johnson MD, have performed the following reviews on [employer] prior to the start of the procedure:            * Patient was identified by name and date of birth   * Agreement on procedure being performed was verified  * Risks and Benefits explained to the patient  * Procedure site verified and marked as necessary  * Patient was positioned for comfort  * Consent was signed and verified     Time: 9:53 AM      Date of procedure: 10/12/2018    Procedure performed by:  PROCEDURE ROOM    Provider assisted by: Katlin Engel      Patient assisted by: self    How tolerated by patient: tolerated the procedure well with no complications    Post Procedural Pain Scale: 0 - No Hurt    Comments: none

## 2018-10-12 NOTE — PATIENT INSTRUCTIONS
Thank you for choosing [facility] and [facility] for your     care. You may receive a survey about your visit. We appreciate you taking time     to complete this survey as we use your feedback to improve our services. We     realize we are not perfect, but we strive to provide excellent care.

## 2018-10-12 NOTE — MR AVS SNAPSHOT
[address] [facility] 
 
 
 [address] [facility] [address] 
[phone] Patient: Cale Figueroa MRN: [id number] :1975 Visit Information Date & Time Provider Department Dept. Phone Encounter #  
 10/12/2018  8:40 AM EMG [address] at [address] [facility] [id number] Your Appointments 2018  8:30 AM  
Follow Up with Chaitanya Muro DO [address] for Pain Management (LORNA SCHEDULING) Appt Note: Return in about 7 WEEKS (around 2018 for 60 min.; Return in about 7 WEEKS (around 2018 for 60 min. [address] [place] [address]  
[phone] [address]  
  
    
 2018 11:45 AM  
Follow Up with Tana Wells MD  
[facility] [address] at [address] [facility] [address] [facility]) Appt Note: follow up all testing complete 1212 [facility] B[address] [address] [address] [facility] 630 [facility] B2 [address] [facility] [address] Upcoming Health Maintenance Date Due DTaP/Tdap/Td series (1 - Tdap) 1998 PAP AKA CERVICAL CYTOLOGY 1998 Influenza Age 5 to Adult 2018 Allergies as of 10/12/2018  Review Complete On: 10/12/2018 By: David Espinosa LPN Severity Noted Reaction Type Reactions Augmentin [Amoxicillin-pot Clavulanate] High 2013    Anaphylaxis Amoxicillin  2013   Topical Hives, Itching Itching of throat Ciprofloxacin  2013   Topical Other (comments) Redness of arm above IV site Darvocet A500 [Propoxyphene N-acetaminophen]  2013   Topical Hives Macrobid [Nitrofurantoin Monohyd/m-cryst]  2013   Side Effect Hives Nubain [Nalbuphine]  2013   Topical Hives, Other (comments) Muscle spasms Phenergan [Promethazine]  2016    Other (comments)  Makes me feel like Im on fire from the inside out and causes involuntary muscle spasms. Toradol [Ketorolac Tromethamine]  01/11/2016    Itching Vibramycin [Doxycycline Calcium]  04/04/2013   Topical Hives, Itching Itching of the throat Current Immunizations  Never Reviewed No immunizations on file. Not reviewed this visit Vitals BP Pulse Temp Resp Height(growth percentile) Weight(growth percentile) 103/78 (BP 1 Location: Right arm, BP Patient Position: Sitting) 95 99.5 °F (37.5 °C) (Oral) 18 5' 1\" (1.549 m) 130 lb (59 kg) SpO2 BMI OB Status Smoking Status 98% 24.56 kg/m2 Hysterectomy Former Smoker Vitals History BMI and BSA Data Body Mass Index Body Surface Area 24.56 kg/m 2 1.59 m 2 Preferred Pharmacy Pharmacy Name Phone [address] [facility] [address] [place] [address] [phone] Your Updated Medication List  
  
   
This list is accurate as of 10/12/18  9:13 AM.  Always use your most recent med list.  
  
  
  
  
 AZO PO Take  by mouth. baclofen 10 mg tablet Commonly known as:  LIORESAL  
1 or 2 tablets as needed up to 3 times daily. Maximum 5 tablets daily. BENADRYL 25 mg capsule Generic drug:  diphenhydrAMINE Take 50 mg by mouth two (2) times daily as needed. docusate sodium 100 mg capsule Commonly known as:  [place] Take 1 Cap by mouth two (2) times daily as needed for Constipation for up to 90 days. DULoxetine 60 mg capsule Commonly known as:  CYMBALTA Take 1 Cap by mouth two (2) times a day. EPINEPHrine 0.3 mg/0.3 mL (1:1,000) injection Commonly known as:  AUVI-Q  
0.3 mL by IntraMUSCular route once as needed for Other (severe allergic reaction ) for 1 dose. HYDROmorphone 8 mg tablet Commonly known as:  DILAUDID Take 1 Tab by mouth three (3) times daily as needed for Pain for up to 30 days. Max Daily Amount: 24 mg. Indications: Pain Start taking on:  11/15/2018  
  
 methadone 10 mg tablet Commonly known as:  DOLOPHINE  
 Take 1 Tab by mouth two (2) times a day for 30 days. Max Daily Amount: 20 mg. Indications: Chronic Pain, Severe Pain Start taking on:  11/15/2018 NARCAN IJ  
1 Dose by Injection route as needed. For accidental overdose  
  
 senna 8.6 mg tablet Commonly known as:  Senna Take 1 Tab by mouth daily as needed for Constipation. To-Do List   
 10/23/2018 9:30 AM  
  Appointment with Patrick6 Dominique Mayes EEG RM 1 at [place] [id number] ([phone]) All patients (\"Awake only\" and \"Awake & Asleep\"): 1) Hair must be clean, free of oils, gels, spray, mousse. 2) Do not consume any caffeine products (coffee, tea, soda, chocolate) for 24 hours prior to test. 3) Have someone available to drive the patient home if patient is sedated. 4) May take medications or eat meals prior to study as normal.  If doing AWAKE ONLY --- There are no sleep instructions for this test and meals are allowed. If doing AWAKE & ASLEEP:  patient must stay up until 2:00 am and then wake up at 6:00 am on the day of study (sleep 4 hours only), without the aid of Caffeine. Additional instructions for \"Awake & Asleep\" only study: ADULT patients should ONLY get four (4) hours of sleep the night prior to study. (Preferred: Stay awake until 2:00 am and sleep until 6:00 am). The EEG Technologist CANNOT sedate any patient. However, the ordering physician may write a prescription and give to the patient for the patient to have filled prior to arriving in the EEG department if needed for sleep. If this method is chosen, the physician's order must state \"okay for patient to self administer (drug name) for sedation\". The patient must also be accompanied by an adult that will drive them home or the patient will not be permitted to take the sedation.  Appointments scheduled before 3:00pm:  Please arrive in the [address] [place] [address] and check in with the registrar 15 minutes prior to your scheduled appointment time. This is the same entrance as the [facility], facing [address] Parking: turn off [address] onto [address]. Proceed one block and make a right onto [address] [place] will be on your left). Go to the end of [address] and parking is located on your left. Appointments scheduled at 3:00pm or later:  Registration in the [address] [place] [address] CLOSES at 3:00 p.m. Patients should report to Patient Access/Admitting located on the left after entering the MAIN entrance of DR. [facility]. Patient should plan to arrive 20 minutes prior to their appointment time if going to Patient Access/Admitting. After test, patient may exit from the [address] [place] [address] or [facility] Entrance. 10/23/2018 11:00 AM  
  Appointment with [facility] CT RM 2 at [facility] RAD [address] ([phone]) GENERAL INSTRUCTIONS  This study does not require you to drink contrast prior to your study. Drink plenty of fluids prior to your exam.   RELATED STUDY INFORMATION  Bring any films, CD's, and reports related with you on the day of your exam.  This only includes studies done outside of [address] [place] [address], [facility], [facility] , and [place]. QUESTIONS  Notify the CT Department if you have any questions concerning your study. [facility]  - [phone] Dois Ziyad Wiggins - [phone] Patient Instructions Thank you for choosing [facility] and [facility] for your  
 
care. You may receive a survey about your visit. We appreciate you taking time  
 
to complete this survey as we use your feedback to improve our services. We  
 
realize we are not perfect, but we strive to provide excellent care. Introducing [facility]! Dear Shanita Chandler: Thank you for requesting a [employer] account. Our records indicate that you already have an active [employer] account.   You can access your account anytime at [email or web address] Did you know that you can access your hospital and ER discharge instructions at any time in [employer]? You can also review all of your test results from your hospital stay or ER visit. Additional Information If you have questions, please visit the Frequently Asked Questions section of the [employer] website at [email or web address] Remember, [employer] is NOT to be used for urgent needs. For medical emergencies, dial 911. Now available from your iPhone and Android! Please provide this summary of care documentation to your next provider. Your primary care clinician is listed as Anthony Reyes. If you have any questions after today's visit, please call [phone].

## 2018-10-23 ENCOUNTER — HOSPITAL ENCOUNTER (OUTPATIENT)
Dept: NEUROLOGY | Age: 43
Discharge: HOME | End: 2018-10-23
Attending: NURSE PRACTITIONER
Payer: MEDICAID

## 2018-10-23 ENCOUNTER — HOSPITAL ENCOUNTER (OUTPATIENT)
Dept: CT IMAGING | Age: 43
Discharge: HOME | End: 2018-10-23
Attending: NURSE PRACTITIONER
Payer: MEDICAID

## 2018-10-23 DIAGNOSIS — G95.9 MYELOPATHY (HCC): ICD-10-CM

## 2018-10-23 DIAGNOSIS — R29.2 ABNORMAL DTR (DEEP TENDON REFLEX): ICD-10-CM

## 2018-10-23 DIAGNOSIS — R25.9 ABNORMAL INVOLUNTARY MOVEMENT: ICD-10-CM

## 2018-10-23 DIAGNOSIS — R25.9 ABNORMAL INVOLUNTARY MOVEMENTS: ICD-10-CM

## 2018-10-23 PROCEDURE — 72128 CT CHEST SPINE W/O DYE: CPT

## 2018-10-23 PROCEDURE — 95816 EEG AWAKE AND DROWSY: CPT

## 2018-10-23 PROCEDURE — 72125 CT NECK SPINE W/O DYE: CPT

## 2018-10-24 NOTE — PROCEDURES
[address]  MR#: [id number]  : 1975  ACCOUNT #: [de-identified]   DATE OF SERVICE: 10/23/2018    EEG NUMBER:  Not provided. HISTORY:  A 56-year-old female with a history of clinical myoclonus and question of epileptiform abnormalities. MEDICATIONS:  Cymbalta, baclofen, Dolophine, Dilaudid, Colace. EEG REPORT:  This is a 16-channel routine EEG done using the International 10-20 electrode placement system. The predominant background consists of posterior predominant 9-10 Hz activity, which is regular, symmetric, and which attenuates with eye opening. There were no abnormalities during hyperventilation, and there were no abnormal photoparoxysmal responses. Electroencephalographic sleep or drowsiness was not observed. No epileptiform abnormalities were observed. No clinical episodes of myoclonus were observed throughout the recording or on video. IMPRESSION:  This is a normal awake EEG.       MD JILLIAN Schumacher / MEMO  D: 10/24/2018 07:35     T: 10/24/2018 09:40  JOB #: [id number]

## 2018-11-05 ENCOUNTER — OFFICE VISIT (OUTPATIENT)
Dept: PAIN MANAGEMENT | Age: 43
End: 2018-11-05

## 2018-11-05 ENCOUNTER — OFFICE VISIT (OUTPATIENT)
Dept: NEUROLOGY | Age: 43
End: 2018-11-05

## 2018-11-05 VITALS
SYSTOLIC BLOOD PRESSURE: 122 MMHG | BODY MASS INDEX: 24.35 KG/M2 | TEMPERATURE: 99.5 F | OXYGEN SATURATION: 99 % | WEIGHT: 129 LBS | HEIGHT: 61 IN | HEART RATE: 86 BPM | DIASTOLIC BLOOD PRESSURE: 80 MMHG | RESPIRATION RATE: 16 BRPM

## 2018-11-05 VITALS
SYSTOLIC BLOOD PRESSURE: 147 MMHG | WEIGHT: 130 LBS | BODY MASS INDEX: 24.55 KG/M2 | HEIGHT: 61 IN | DIASTOLIC BLOOD PRESSURE: 84 MMHG | TEMPERATURE: 99.2 F | RESPIRATION RATE: 14 BRPM | HEART RATE: 100 BPM

## 2018-11-05 DIAGNOSIS — N30.10 INTERSTITIAL CYSTITIS: Primary | ICD-10-CM

## 2018-11-05 DIAGNOSIS — M79.10 MYALGIA: ICD-10-CM

## 2018-11-05 DIAGNOSIS — R29.2 ABNORMAL DTR (DEEP TENDON REFLEX): Primary | ICD-10-CM

## 2018-11-05 DIAGNOSIS — R25.9 ABNORMAL INVOLUNTARY MOVEMENT: ICD-10-CM

## 2018-11-05 DIAGNOSIS — Z79.899 ENCOUNTER FOR LONG-TERM (CURRENT) USE OF HIGH-RISK MEDICATION: ICD-10-CM

## 2018-11-05 DIAGNOSIS — R10.84 GENERALIZED ABDOMINAL PAIN: ICD-10-CM

## 2018-11-05 DIAGNOSIS — M62.838 MUSCLE SPASM: ICD-10-CM

## 2018-11-05 DIAGNOSIS — G89.4 CHRONIC PAIN SYNDROME: ICD-10-CM

## 2018-11-05 RX ORDER — DULOXETIN HYDROCHLORIDE 60 MG/1
60 CAPSULE, DELAYED RELEASE ORAL 2 TIMES DAILY
Qty: 60 CAP | Refills: 2 | Status: SHIPPED | OUTPATIENT
Start: 2018-11-14 | End: 2018-11-21

## 2018-11-05 RX ORDER — CYCLOBENZAPRINE HCL 5 MG
TABLET ORAL
Qty: 60 TAB | Refills: 2 | Status: SHIPPED | OUTPATIENT
Start: 2018-11-14 | End: 2019-02-06 | Stop reason: SDUPTHER

## 2018-11-05 RX ORDER — METHADONE HYDROCHLORIDE 5 MG/1
TABLET ORAL
Qty: 90 TAB | Refills: 0 | Status: SHIPPED | OUTPATIENT
Start: 2018-11-15 | End: 2018-12-07 | Stop reason: SDUPTHER

## 2018-11-05 RX ORDER — PSEUDOEPHEDRINE HCL 30 MG
TABLET ORAL
COMMUNITY
End: 2022-09-19

## 2018-11-05 RX ORDER — METHADONE HYDROCHLORIDE 5 MG/1
TABLET ORAL
Qty: 90 TAB | Refills: 0 | Status: SHIPPED | OUTPATIENT
Start: 2018-11-15 | End: 2018-11-05 | Stop reason: CLARIF

## 2018-11-05 RX ORDER — HYDROMORPHONE HYDROCHLORIDE 8 MG/1
8 TABLET ORAL
Qty: 90 TAB | Refills: 0 | Status: SHIPPED | OUTPATIENT
Start: 2018-11-15 | End: 2018-12-07 | Stop reason: SDUPTHER

## 2018-11-05 RX ORDER — BACLOFEN 10 MG/1
TABLET ORAL
Qty: 110 TAB | Refills: 2 | Status: SHIPPED | OUTPATIENT
Start: 2018-11-14 | End: 2018-11-21

## 2018-11-05 NOTE — PROGRESS NOTES
Referral date before 2011, source PCP Dr Cheli Rosario and for chronic pelvic pain due to interstitial cystitis. Social History     Socioeconomic History    Marital status:      Spouse name: Not on file    Number of children: Not on file    Years of education: Not on file    Highest education level: Not on file   Social Needs    Financial resource strain: Not on file    Food insecurity - worry: Not on file    Food insecurity - inability: Not on file    Transportation needs - medical: Not on file   [employer] needs - non-medical: Not on file   Occupational History    Not on file   Tobacco Use    Smoking status: Former Smoker     Packs/day: 0.50     Years: 10.00     Pack years: 5.00    Smokeless tobacco: Never Used   Substance and Sexual Activity    Alcohol use: Yes     Comment: 1 every 2 months    Drug use: No    Sexual activity: Not on file     Comment: hysterectomy   Other Topics Concern    Not on file   Social History Narrative    Not on file     Family History   Problem Relation Age of Onset    Post-op Nausea/Vomiting Sister     Malignant Hyperthermia Neg Hx     Pseudocholinesterase Deficiency Neg Hx     Delayed Awakening Neg Hx     Emergence Delirium Neg Hx     Post-op Cognitive Dysfunction Neg Hx      Allergies   Allergen Reactions    Augmentin [Amoxicillin-Pot Clavulanate] Anaphylaxis    Amoxicillin Hives and Itching     Itching of throat    Ciprofloxacin Other (comments)     Redness of arm above IV site    Darvocet A500 [Propoxyphene N-Acetaminophen] Hives    Macrobid [Nitrofurantoin Monohyd/M-Cryst] Hives    Nubain [Nalbuphine] Hives and Other (comments)     Muscle spasms    Phenergan [Promethazine] Other (comments)     Makes me feel like Im on fire from the inside out and causes involuntary muscle spasms.      Toradol [Ketorolac Tromethamine] Itching    Vibramycin [Doxycycline Calcium] Hives and Itching     Itching of the throat     Past Medical History: Diagnosis Date    Anxiety     Arthritis     osteopenia    Chronic kidney disease     kidney stones    Chronic pain     bladder    Depression     Fibromyalgia     Interstitial cystitis     Nausea & vomiting     Other ill-defined conditions(799.89)     chronic interstitual cyctitis    Other ill-defined conditions(799.89)     endometriosis    Psychiatric disorder      Past Surgical History:   Procedure Laterality Date    HX APPENDECTOMY      HX  SECTION      HX HEENT      oral surgery wisdom teeth extractiion    HX HYSTERECTOMY      LAVH    HX PELVIC LAPAROSCOPY      x 5    HX UROLOGICAL      cysto and hydrodistention x 4    HX UROLOGICAL      Interstim implant    HX UROLOGICAL  2013    revision interstim     Current Outpatient Medications on File Prior to Visit   Medication Sig    pseudoephedrine (SUDAFED) 30 mg tablet Take  by mouth every four (4) hours as needed for Congestion.  phenazopyridine HCl (AZO PO) Take 1 Tab by mouth daily.  DULoxetine (CYMBALTA) 60 mg capsule Take 1 Cap by mouth two (2) times a day.  baclofen (LIORESAL) 10 mg tablet 1 or 2 tablets as needed up to 3 times daily. Maximum 5 tablets daily.  [START ON 11/15/2018] methadone (DOLOPHINE) 10 mg tablet Take 1 Tab by mouth two (2) times a day for 30 days. Max Daily Amount: 20 mg. Indications: Chronic Pain, Severe Pain    diphenhydrAMINE (BENADRYL) 25 mg capsule Take 50 mg by mouth two (2) times daily as needed.  EPINEPHrine (AUVI-Q) 0.3 mg/0.3 mL (1:1,000) injection 0.3 mL by IntraMUSCular route once as needed for Other (severe allergic reaction ) for 1 dose.  senna (SENNA) 8.6 mg tablet Take 1 Tab by mouth daily as needed for Constipation.  docusate sodium (COLACE) 100 mg capsule Take 1 Cap by mouth two (2) times daily as needed for Constipation for up to 90 days.  NALOXONE HCL (NARCAN IJ) 1 Dose by Injection route as needed.  For accidental overdose     No current facility-administered medications on file prior to visit. HPI:  Piyush Escobar is a 41 y.o. female here for f/u visit for ongoing evaluation of interstitial cystitis. Pt was last seen here on 9/17/2018. Pt denies interval changes in the character or distribution of pain. Pain is located almost like a belt all the way around her trunk. The pain is described as aching to stabbing. Pain ranges from 4-10/10.    --hx of  implanted InterStim which required revision in 2013. --Referral to infusion pain clinic done and pt found the services to be too expensive. --neurology consult has been done and workup is ongoing, to include electrodiagnostics, EEG, CT of cervical and thoracic region. --Cymbalta 60 mg bid helpful.  ---Baclofen 10 mg tid prn for spasms. --flexeril 5mg nightly is helpful for overall muscular relaxation to include the pelvic floor twitching but feels that she needs to increase the dose. --Dilaudid 8 mg up to 3 times daily  --Methadone 10 mg twice daily. Pt reports partial but incomplete analgesia with the current opioid regimen which helps to improve activity tolerance and function. Pt denies aberrant behaviors or any adverse effects. She has been tolerating ongoing opioid weaning without significant change in her overall pain complaints or level of functioning. She has reported improvement in her symptoms of depression. ROS:Review of systems is negative for chills, vomiting, shortness of breath, difficulty swallowing, acute changes in vision, acute changes in hearing, falls, bladder incontinence, bowel incontinence, suicidal ideation, homicidal ideation, alcohol use. Review of systems positive for fever, chills, diarrhea, constipation, chest pain, abdominal pain, weakness, dizziness, depression, anxiety.       Opioid specific risk: Panic disorder, anxiety, depression, constipation     Opioid specific history: Patient has been on continuous opioid therapy for approximately 6 years.  She has not had symptoms of withdrawal in the past.  She expresses a fear of developing withdrawal symptoms. She states that she would like to be opioid free. Vitals:    18 0803   BP: 147/84   Pulse: 100   Resp: 14   Temp: 99.2 °F (37.3 °C)   TempSrc: Oral   Weight: 59 kg (130 lb)   Height: 5' 1\" (1.549 m)   PainSc:   6   PainLoc: Abdomen           EK18 -- QT - 360ms    PE:  AFVSS, no acute distress, normal body habitus. A&OXs 3.  normocephalic, atraumatic. Conjugate gaze, clear sclerae. Mood is pleasant and appropriate. Patient is cooperative. Speech is clear and appropriate. Gait is within functional limits. Balance is within functional limits. Calculated MEQ -156  Naloxone rescue - yes  Prophylactic bowel program - yes  Date of last OCA May 2018. Last UDS May 3, 2018, consistent  , date checked today, findings consistent    Primary Care Physician  [address], [address] [facility]  [phone]      GIC-2 and 4    TEE -62%    COMM-17    PHQ -- .   PHQ over the last two weeks 2018   PHQ Not Done -   Little interest or pleasure in doing things Several days   Feeling down, depressed, irritable, or hopeless Several days   Total Score PHQ 2 2   Trouble falling or staying asleep, or sleeping too much More than half the days   Feeling tired or having little energy More than half the days   Poor appetite, weight loss, or overeating Several days   Feeling bad about yourself - or that you are a failure or have let yourself or your family down Several days   Trouble concentrating on things such as school, work, reading, or watching TV More than half the days   Moving or speaking so slowly that other people could have noticed; or the opposite being so fidgety that others notice Several days   Thoughts of being better off dead, or hurting yourself in some way Not at all   PHQ 9 Score 11   How difficult have these problems made it for you to do your work, take care of your home and get along with others Very difficult     DSM V-OUD Screen--mild to moderate    Assessment/Plan:     ICD-10-CM ICD-9-CM    1. Interstitial cystitis N30.10 595.1 HYDROmorphone (DILAUDID) 8 mg tablet      methadone (DOLOPHINE) 5 mg tablet      REFERRAL TO PHYSICAL THERAPY      REFERRAL TO PSYCHOLOGY   2. Myalgia M79.10 729.1 DULoxetine (CYMBALTA) 60 mg capsule      cyclobenzaprine (FLEXERIL) 5 mg tablet      baclofen (LIORESAL) 10 mg tablet      REFERRAL TO PHYSICAL THERAPY      REFERRAL TO PSYCHOLOGY   3. Muscle spasm M62.838 728.85 cyclobenzaprine (FLEXERIL) 5 mg tablet      baclofen (LIORESAL) 10 mg tablet      REFERRAL TO PHYSICAL THERAPY   4. Encounter for long-term (current) use of high-risk medication Z79.899 V58.69    5. Generalized abdominal pain R10.84 789.07 HYDROmorphone (DILAUDID) 8 mg tablet      methadone (DOLOPHINE) 5 mg tablet      DULoxetine (CYMBALTA) 60 mg capsule      REFERRAL TO PSYCHOLOGY   6. Chronic pain syndrome G89.4 338.4 HYDROmorphone (DILAUDID) 8 mg tablet      methadone (DOLOPHINE) 5 mg tablet      DULoxetine (CYMBALTA) 60 mg capsule      REFERRAL TO PSYCHOLOGY        --Continue Cymbalta 60 mg twice daily  - Continue baclofen 10 mg, 1-2 tabs up to 3 times daily with a maximum of 4 tablets daily as needed  - Consider retrial of Elavil once methadone has been discontinued  - I do not recommend long-term opioid therapy for this person's chronic pain. I believe the risks outweigh any potential benefits. Patient was educated on signs and symptoms of opioid withdrawal and advised to call the clinic should these symptoms arise so that we may provide support as needed. --Refill at a reduced rate methadone 5 mg tablets to be taken 2 tablets in the a.m. and 1 tablet in the p.m. for total of 3 tablets daily. At time of refill consider reducing this to 5 mg twice daily. --Continue Dilaudid 8 mg up to 3 times daily until methadone is been discontinued.   Then progress with weaning of Dilaudid. --continue to follow with urology. --pain pump and SCS discussions continue  --Lidocaine and or ketamine infusions remain an option for trial.  --Pelvic floor physical therapy was poorly tolerated in the past, last done more than 9 years ago. [facility] --Pain psychology referral placed    GOALS:  To establish complementary and integrative plan of care to address chronic pain issues while minimizing pharmaceuticals to maximize patient's function improve quality of life. Education:  Patient again educated on the importance of strict compliance with the opioid care agreement while on opioid therapy. Patient also again educated that they should avoid driving while on chronic opioid therapy. Also advised to avoid alcohol and to avoid benzodiazepines while on opioid therapy. Patient Homework:  Continue to independently investigate yoga for persons with chronic pain. F/u:. Follow-up Disposition:  Return in about 2 months (around 1/5/2019) for 60 min.

## 2018-11-05 NOTE — PATIENT INSTRUCTIONS
Thank you for choosing [employer] [facility] for your     care. You may receive a survey about your visit. We appreciate you taking time     to complete this survey as we use your feedback to improve our services.

## 2018-11-05 NOTE — PROGRESS NOTES
Nursing Notes    Patient presents to the office today in follow-up. Patient rates her pain at 6/10 on the numerical pain scale. Reviewed medications with counts as follows:    Rx Date filled Qty Dispensed Pill Count Last Dose Short   Methadone 10 mg  10/16/18 60 19 today no   Dilaudid 8 mg  10/16/18 90 29 today no                             Last opioid agreement 05/03/18  Last urine drug screen 05/03/18    Comments:     POC UDS was not performed in office today    Any new labs or imaging since last appointment? YES. Pt states that she had 2 CT scans done. One for cervical and thoracic spine. She also had an EEG done and EMG done of her upper and lower extremities. These were ordered by Dr. Ricky Shelby    Have you been to an emergency room (ER) or urgent care clinic since your last visit? NO            Have you been hospitalized since your last visit? NO     If yes, where, when, and reason for visit? Have you seen or consulted any other health care providers outside of the [address] [place] [address]  since your last visit? YES     If yes, where, when, and reason for visit? Dr. Ricky Shelby  Ms. Efra Bateman has a reminder for a \"due or due soon\" health maintenance. I have asked that she contact her primary care provider for follow-up on this health maintenance.     PHQ over the last two weeks 11/5/2018   PHQ Not Done -   Little interest or pleasure in doing things Several days   Feeling down, depressed, irritable, or hopeless Several days   Total Score PHQ 2 2   Trouble falling or staying asleep, or sleeping too much More than half the days   Feeling tired or having little energy More than half the days   Poor appetite, weight loss, or overeating Several days   Feeling bad about yourself - or that you are a failure or have let yourself or your family down Several days   Trouble concentrating on things such as school, work, reading, or watching TV More than half the days   Moving or speaking so slowly that other people could have noticed; or the opposite being so fidgety that others notice Several days   Thoughts of being better off dead, or hurting yourself in some way Not at all   PHQ 9 Score 11   How difficult have these problems made it for you to do your work, take care of your home and get along with others Very difficult     Pt reports that the increase in cymbalta has helped with her depression. Provider made aware of the above score.

## 2018-11-05 NOTE — PROGRESS NOTES
[facility]  [address] [facility], [place], [address]  [address] [place], [address]  Office:  [phone]  Fax: [phone]    Referring: Dr. Nazario Cueva    No chief complaint on file. Patient returns today for follow-up after her initial consultation for brisk reflexes. She has a stimulator due to her interstitial cystitis and was unable to get MRI of the cervical thoracic spine that were ordered last visit. Again this was ordered secondary to abnormally brisk reflexes. She has not given history consistent with any focal deficits that persisted for any point in time and then spontaneously resolved. Secondary to the fact that we are unable to get MRI we went ahead and got a CT scan of the cervical and thoracic spine which did not show any anatomical reason for her symptomology i.e. no cord compression. This however is inadequate to evaluate for potential demyelination. EEG was normal.  EMG also normal not demonstrative of any type of neuromuscular/motor neuron disease type issue. She has continued to follow with Dr. [facility]. His last note was reviewed and her medications continued. We reviewed history today and she is never had any focal neurologic deficit that persisted for any point in time. She has never had any visual difficulty that persisted for any point in time. She continues to note that she just feels anxious and has been tremulous. She underwent the testing as noted above without any difficulty. She does indicate that she was told she could have an MRI of the brain just not the spinal cord due to the location of the stimulator.       Past Medical History:   Diagnosis Date    Anxiety     Arthritis     osteopenia    Chronic kidney disease     kidney stones    Chronic pain     bladder    Depression     Fibromyalgia     Interstitial cystitis     Nausea & vomiting     Other ill-defined conditions(099.61) chronic interstitual cyctitis    Other ill-defined conditions(799.89)     endometriosis    Psychiatric disorder        Past Surgical History:   Procedure Laterality Date    HX APPENDECTOMY      HX  SECTION      HX HEENT      oral surgery wisdom teeth extractiion    HX HYSTERECTOMY      LAVH    HX PELVIC LAPAROSCOPY      x 5    HX UROLOGICAL      cysto and hydrodistention x 4    HX UROLOGICAL      Interstim implant    HX UROLOGICAL  2013    revision interstim       Current Outpatient Medications   Medication Sig Dispense Refill    pseudoephedrine (SUDAFED) 30 mg tablet Take  by mouth every four (4) hours as needed for Congestion.  [START ON 11/15/2018] HYDROmorphone (DILAUDID) 8 mg tablet Take 1 Tab by mouth three (3) times daily as needed for Pain for up to 30 days. Max Daily Amount: 24 mg. 90 Tab 0    [START ON 2018] DULoxetine (CYMBALTA) 60 mg capsule Take 1 Cap by mouth two (2) times a day for 30 days. 60 Cap 2    [START ON 2018] cyclobenzaprine (FLEXERIL) 5 mg tablet Take 1 or 2 tablets as needed nightly for muscle spasm 60 Tab 2    [START ON 2018] baclofen (LIORESAL) 10 mg tablet Take 1-2 tablets as needed up to 3 times daily with a maximum of 4 tablets daily 110 Tab 2    [START ON 11/15/2018] methadone (DOLOPHINE) 5 mg tablet Take 2 tablets in the a.m. and 1 tablet in the evening. Take a total of 3 tablets daily. 90 Tab 0    phenazopyridine HCl (AZO PO) Take 1 Tab by mouth daily.  DULoxetine (CYMBALTA) 60 mg capsule Take 1 Cap by mouth two (2) times a day. 60 Cap 2    baclofen (LIORESAL) 10 mg tablet 1 or 2 tablets as needed up to 3 times daily. Maximum 5 tablets daily. 130 Tab 3    senna (SENNA) 8.6 mg tablet Take 1 Tab by mouth daily as needed for Constipation. 30 Tab 2    docusate sodium (COLACE) 100 mg capsule Take 1 Cap by mouth two (2) times daily as needed for Constipation for up to 90 days.  60 Cap 2    diphenhydrAMINE (BENADRYL) 25 mg capsule Take 50 mg by mouth two (2) times daily as needed.  NALOXONE HCL (NARCAN IJ) 1 Dose by Injection route as needed. For accidental overdose      EPINEPHrine (AUVI-Q) 0.3 mg/0.3 mL (1:1,000) injection 0.3 mL by IntraMUSCular route once as needed for Other (severe allergic reaction ) for 1 dose. 0.6 mL 1        Allergies   Allergen Reactions    Augmentin [Amoxicillin-Pot Clavulanate] Anaphylaxis    Amoxicillin Hives and Itching     Itching of throat    Ciprofloxacin Other (comments)     Redness of arm above IV site    Darvocet A500 [Propoxyphene N-Acetaminophen] Hives    Macrobid [Nitrofurantoin Monohyd/M-Cryst] Hives    Nubain [Nalbuphine] Hives and Other (comments)     Muscle spasms    Phenergan [Promethazine] Other (comments)     Makes me feel like Im on fire from the inside out and causes involuntary muscle spasms.  Toradol [Ketorolac Tromethamine] Itching    Vibramycin [Doxycycline Calcium] Hives and Itching     Itching of the throat       Social History     Tobacco Use    Smoking status: Former Smoker     Packs/day: 0.50     Years: 10.00     Pack years: 5.00    Smokeless tobacco: Never Used   Substance Use Topics    Alcohol use: Yes     Comment: 1 every 2 months    Drug use: No       Family History   Problem Relation Age of Onset    Post-op Nausea/Vomiting Sister     Malignant Hyperthermia Neg Hx     Pseudocholinesterase Deficiency Neg Hx     Delayed Awakening Neg Hx     Emergence Delirium Neg Hx     Post-op Cognitive Dysfunction Neg Hx        Review of Systems:  Pertinent positives and negatives as noted otherwise comprehensive review is negative. Physical Examination:    Visit Vitals  /80 (BP 1 Location: Left arm, BP Patient Position: Sitting)   Pulse 86   Temp 99.5 °F (37.5 °C)   Resp 16   Ht 5' 1\" (1.549 m)   Wt 58.5 kg (129 lb)   SpO2 99%   BMI 24.37 kg/m²     She is a pleasant lady. Awake alert oriented. She is a bit anxious today. No icterus. No edema. She has no facial asymmetry. She has postural tremor. Tone is increased in the lower extremities mild degree bilaterally. No cogwheeling. No rest tremor. She resists fully to direct conversational testing upper and lower extremities. Reflexes continue to be abnormally brisk with crossed adductors and clonus at the knees. 2-3 beats of self extinguishing clonus at the ankles. She is able to ambulate and gets onto her heels and toes    Impression/Plan  Abnormally brisk reflexes as noted above and we have excluded ictus as well as motor neuron disease. The CT scans of the cervical and thoracic spine of excluded a structural cause i.e. no evidence of cord compression but certainly did not exclude demyelination which is our biggest concern. We discussed her test results. She had several good questions today regarding the test that she had, differential diagnosis, symptoms etc.  We discussed and answered all his questions today at length. We will proceed with an MRI of the brain looking for demyelination which will give us a diagnosis if indeed present. If the MRI of the brain is normal then that makes demyelinating disease less likely as she gives no history of visual disturbance that would go along with a cord lesion i.e. neuromyelitis optica and she also gives no history of ever having any type of paraplegia etc. that would lead us to believe she had an episode of transverse myelitis and we have the structural abnormalities from before as noted. It does not however exclude fully a demyelinating lesion in the cord and we did discuss that. She will follow-up with our nurse practitioner, Jered Cowart, after the MRI of the brain has been performed. Signed By: Cecy Connor MD      Total time: 25 min   Counseling / coordination time: 15 min   > 50% counseling / coordination?: Yes re: as documented above      This note was created using voice recognition software.  Despite editing, there may be syntax errors. This note will not be viewable in [address]

## 2018-11-05 NOTE — PATIENT INSTRUCTIONS
Safe Use of Opioid Pain Medicine: Care Instructions  Your Care Instructions  Pain is your body's way of warning you that something is wrong. Pain feels different for everybody. Only you can describe your pain. A doctor can suggest or prescribe many types of medicines for pain. These range from over-the-counter medicines like acetaminophen (Tylenol) to powerful medicines called opioids. Examples of opioids are fentanyl, hydrocodone, morphine, and oxycodone. Heroin is an illegal opioid  Opioids are strong medicines. They can help you manage pain when you use them the right way. But if you misuse them, they can cause serious harm and even death. For these reasons, doctors are very careful about how they prescribe opioids. If you decide to take opioids, here are some things to remember. · Keep your doctor informed. You can get addicted to opioids. The risk is higher if you have a history of substance use. Your doctor will monitor you closely for signs of misuse and addiction and to figure out when you no longer need to take opioids. · Make a treatment plan. The goal of your plan is to be able to function and do the things you need to do, even if you still have some pain. You might be able to manage your pain with other non-opioid options like physical therapy, relaxation, or over-the-counter pain medicines. · Be aware of the side effects. Opioids can cause serious side effects, such as constipation, dry mouth, and nausea. And over time, you may need a higher dose to get pain relief. This is called tolerance. Your body also gets used to opioids. This is called physical dependence. If you suddenly stop taking them, you may have withdrawal symptoms. The doctor carefully considered what pain medicine is right for you. You may not have received opioids if your doctor was concerned about drug interactions or your safety, or if he or she had other concerns. It is best to have one doctor or clinic treat your pain. This way you will get the pain medicine that will help you the most. And a doctor will be able to watch for any problems that the medicine might cause. The doctor has checked you carefully, but problems can develop later. If you notice any problems or new symptoms,  get medical treatment right away. Follow-up care is a key part of your treatment and safety. Be sure to make and go to all appointments, and call your doctor if you are having problems. It's also a good idea to know your test results and keep a list of the medicines you take. How can you care for yourself at home? · If you need to take opioids to manage your pain, remember these safety tips. ? Follow directions carefully. It's easy to misuse opioids if you take a dose other than what's prescribed by your doctor. This can lead to overdose and even death. Even sharing them with someone they weren't meant for is misuse. ? Be cautious. Opioids may affect your judgment and decision making. Do not drive or operate machinery until you can think clearly. Talk with your doctor about when it is safe to drive. ? Reduce the risk of drug interactions. Opioids can be dangerous if you take them with alcohol or with certain drugs like sleeping pills and muscle relaxers. Make sure your doctor knows about all the other medicines you take, including over-the-counter medicines. Don't start any new medicines before you talk to your doctor or pharmacist.  ? Keep others safe. Store opioids in a safe and secure place. Make sure that pets, children, friends, and family can't get to them. When you're done using opioids, make sure to properly dispose of them. You can either use a community drug take-back program or your drugstore's mail-back program. If one of these programs isn't available, you can flush opioid skin patches and unused opioid pills down the toilet. ? Reduce the risk of overdose. Misuse of opioids can be very dangerous.  Protect yourself by asking your doctor about a naloxone rescue kit. It can help you--and even save your life--if you take too much of an opioid. · Try other ways to reduce pain. ? Relax, and reduce stress. Relaxation techniques such as deep breathing or meditation can help. ? Keep moving. Gentle, daily exercise can help reduce pain over the long run. Try low- or no-impact exercises such as walking, swimming, and stationary biking. Do stretches to stay flexible. ? Try heat, cold packs, and massage. ? Get enough sleep. Pain can make you tired and drain your energy. Talk with your doctor if you have trouble sleeping because of pain. ? Think positive. Your thoughts can affect your pain level. Do things that you enjoy to distract yourself when you have pain instead of focusing on the pain. See a movie, read a book, listen to music, or spend time with a friend. · If you are not taking a prescription pain medicine, ask your doctor if you can take an over-the-counter medicine. When should you call for help? Call your doctor now or seek immediate medical care if:    · You have a new kind of pain.     · You have new symptoms, such as a fever or rash, along with the pain.    Watch closely for changes in your health, and be sure to contact your doctor if:    · You think you might be using too much pain medicine, and you need help to use less or stop.     · Your pain gets worse.     · You would like a referral to a doctor or clinic that specializes in pain management. Where can you learn more? Go to http://santos-tuan.info/. Enter R108 in the search box to learn more about \"Safe Use of Opioid Pain Medicine: Care Instructions. \"  Current as of: September 10, 2017  Content Version: 11.8  © 0935-7597 [employer]. Care instructions adapted under license by [employer] (which disclaims liability or warranty for this information).  If you have questions about a medical condition or this instruction, always ask your healthcare professional. [place] [address] any warranty or liability for your use of this information.

## 2018-11-05 NOTE — PROGRESS NOTES
Chief Complaint   Patient presents with    Results     follow up     [place] [id number] over the last two weeks 11/5/2018   PHQ Not Done -   Little interest or pleasure in doing things Several days   Feeling down, depressed, irritable, or hopeless Several days   Total Score PHQ 2 2   Trouble falling or staying asleep, or sleeping too much More than half the days   Feeling tired or having little energy More than half the days   Poor appetite, weight loss, or overeating Several days   Feeling bad about yourself - or that you are a failure or have let yourself or your family down Several days   Trouble concentrating on things such as school, work, reading, or watching TV More than half the days   Moving or speaking so slowly that other people could have noticed; or the opposite being so fidgety that others notice Several days   Thoughts of being better off dead, or hurting yourself in some way Not at all   PHQ 9 Score 11   How difficult have these problems made it for you to do your work, take care of your home and get along with others Very difficult

## 2018-11-13 ENCOUNTER — DOCUMENTATION ONLY (OUTPATIENT)
Dept: PAIN MANAGEMENT | Age: 43
End: 2018-11-13

## 2018-11-13 NOTE — PROGRESS NOTES
The pt's referral was faxed over to [facility] in [address] [place] [address]. A fax confirmation was received and they will contact the pt if she is approved to be seen.

## 2018-11-16 ENCOUNTER — HOSPITAL ENCOUNTER (OUTPATIENT)
Dept: MRI IMAGING | Age: 43
Discharge: HOME | End: 2018-11-16
Payer: MEDICAID

## 2018-11-16 ENCOUNTER — DOCUMENTATION ONLY (OUTPATIENT)
Dept: PAIN MANAGEMENT | Age: 43
End: 2018-11-16

## 2018-11-16 DIAGNOSIS — R25.9 ABNORMAL INVOLUNTARY MOVEMENT: ICD-10-CM

## 2018-11-16 DIAGNOSIS — R29.2 ABNORMAL DTR (DEEP TENDON REFLEX): ICD-10-CM

## 2018-11-16 PROCEDURE — A9575 INJ GADOTERATE MEGLUMI 0.1ML: HCPCS | Performed by: PSYCHIATRY & NEUROLOGY

## 2018-11-16 PROCEDURE — 74011636320 HC RX REV CODE- 636/320: Performed by: PSYCHIATRY & NEUROLOGY

## 2018-11-16 PROCEDURE — 70553 MRI BRAIN STEM W/O & W/DYE: CPT

## 2018-11-16 RX ADMIN — GADOTERATE MEGLUMINE 10 ML: 376.9 INJECTION INTRAVENOUS at 11:34

## 2018-11-16 NOTE — PROGRESS NOTES
The pt's referral for psychology was faxed over to a Dr. Ponce Barney. The last office note and demographics were faxed along with the referral. A fax confirmation was received and they will contact the pt for an appt.

## 2018-11-26 ENCOUNTER — OFFICE VISIT (OUTPATIENT)
Dept: NEUROLOGY | Age: 43
End: 2018-11-26

## 2018-11-26 VITALS
RESPIRATION RATE: 22 BRPM | HEIGHT: 61 IN | SYSTOLIC BLOOD PRESSURE: 110 MMHG | OXYGEN SATURATION: 97 % | HEART RATE: 126 BPM | BODY MASS INDEX: 24.73 KG/M2 | TEMPERATURE: 100.1 F | WEIGHT: 131 LBS | DIASTOLIC BLOOD PRESSURE: 78 MMHG

## 2018-11-26 DIAGNOSIS — R25.9 ABNORMAL INVOLUNTARY MOVEMENTS: ICD-10-CM

## 2018-11-26 DIAGNOSIS — R29.2 ABNORMAL DTR (DEEP TENDON REFLEX): Primary | ICD-10-CM

## 2018-11-26 NOTE — PROGRESS NOTES
I have reviewed the documentation provided by the nurse practitioner, . Benita Mota, and we have discussed her findings and the clinical impression. I have formulated with her the proposed management plans for this patient. Additionally,  I have personally evaluated the patient to verify the history and to confirm physical findings. Below are my additional comments:  Ms. Francis Marquis returns today for follow-up regarding abnormal movements and brisk reflexes. She underwent MRI scan of the brain and I personally reviewed that. There is no abnormality there and certainly no evidence of demyelination or other process that would explain her spasticity. Serendipitously on the sagittal FLAIR images they were able to capture most of the cervical spine as well and that was normal.  She indicates that she continues to have tremor. She believes is worse if she is in more pain. Her pain medicines are being adjusted and her recent pain management notes are reviewed. She is going to start some pelvic floor therapy and she is going to start seeing a pain psychologist as well. We discussed her normal test results including the MRI of the brain, her CT scans, EEG, and EMG all without evidence of a neurologic issue. We discussed that she is in the frustrating yet reassuring state of not having an explanation for these symptoms but I reassured her that I find no evidence of any primary neurologic issue. Her reflexes are brisk but I do not have an anatomic explanation and some people are brisk and she is brisk and symmetrical with normal studies as stated. At this point follow-up as needed    Cristino Gottron, MD  Total time: 25 min   Counseling / coordination time: 15 min   > 50% counseling / coordination?: Yes re: as documented above    This note will not be viewable in [address]

## 2018-11-26 NOTE — PROGRESS NOTES
[facility]  [address], [address], [place], [address]  [address] [place], [address]  Office:  [phone]  Fax: [phone]    Referring: Dr. Yasir Castro    HPI:  Crow Green is a 41year old female who was referred by Dr. Yasir Castro for abnormal reflexes, clonus, and myoclonus. Patient returns today for follow-up after her initial consultation for brisk reflexes. She has a stimulator due to her interstitial cystitis and was unable to get MRI of the cervical thoracic spine so she had CT of the cervical and thoracic spine which excluded a structural cause I.e. No cord compression. This however is inadequate to evaluate for potential demyelination. Again this was ordered secondary to abnormally brisk reflexes. She has not given history consistent with any focal deficits that persisted for any point in time and then spontaneously resolved. EEG was normal.  EMG also normal not demonstrative of any type of neuromuscular/motor neuron disease type issue. She has continued to [facility] His last note was reviewed and her medications continued. We reviewed history today and she had not had had any focal neurologic deficits that persisted. She has never had any visual difficulty that persisted for any point in time. She continues to note that she just feels anxious and has been tremulous. She underwent the testing as noted above without any difficulty. She does indicate that she was told she could have an MRI of the brain just not the spinal cord due to the location of the stimulator. She reports the tremor is getting worse. Also the jerking is getting worse, especially when she's going into a sleep, but now since Thanksgiving it has also been during the day. It would occur maybe a dozen times per day. It may have been once in a blue moon but not regular. It escalates at night when she is going into a sleep.  She is wondering if maybe she is just paying attention more. She reports that Dr. Kuldip Chow is decreasing her pain medication and she will soon start pelvic floor PT to help with interstitial cystitis pain. She was originally on 15 mg TID of methadone but now down to 10 mg in am and 5 mg in pm. The PT will be ordered by a urologist. She reports she had a physical on Wednesday which included a pelvic exam which caused discomfort. She states she will see Dr. Tony Wallis from pain management in early January to evaluate other pain management options. She will be seeing someone from pain psychology in December. Past Medical History:   Diagnosis Date    Anxiety     Arthritis     osteopenia    Chronic kidney disease     kidney stones    Chronic pain     bladder    Depression     Fibromyalgia     Interstitial cystitis     Nausea & vomiting     Other ill-defined conditions(799.89)     chronic interstitual cyctitis    Other ill-defined conditions(799.89)     endometriosis    Psychiatric disorder        Past Surgical History:   Procedure Laterality Date    HX APPENDECTOMY      HX  SECTION      HX HEENT      oral surgery wisdom teeth extractiion    HX HYSTERECTOMY      LAVH    HX PELVIC LAPAROSCOPY      x 5    HX UROLOGICAL      cysto and hydrodistention x 4    HX UROLOGICAL      Interstim implant    HX UROLOGICAL  2013    revision interstim       Current Outpatient Medications   Medication Sig Dispense Refill    cyclobenzaprine (FLEXERIL) 5 mg tablet Take 1 or 2 tablets as needed nightly for muscle spasm 60 Tab 2    methadone (DOLOPHINE) 5 mg tablet Take 2 tablets in the a.m. and 1 tablet in the evening. Take a total of 3 tablets daily. 90 Tab 0    phenazopyridine HCl (AZO PO) Take 1 Tab by mouth daily.  DULoxetine (CYMBALTA) 60 mg capsule Take 1 Cap by mouth two (2) times a day. 60 Cap 2    baclofen (LIORESAL) 10 mg tablet 1 or 2 tablets as needed up to 3 times daily.   Maximum 5 tablets daily. 130 Tab 3    pseudoephedrine (SUDAFED) 30 mg tablet Take  by mouth every four (4) hours as needed for Congestion.  HYDROmorphone (DILAUDID) 8 mg tablet Take 1 Tab by mouth three (3) times daily as needed for Pain for up to 30 days. Max Daily Amount: 24 mg. 90 Tab 0    senna (SENNA) 8.6 mg tablet Take 1 Tab by mouth daily as needed for Constipation. 30 Tab 2    docusate sodium (COLACE) 100 mg capsule Take 1 Cap by mouth two (2) times daily as needed for Constipation for up to 90 days. 60 Cap 2    diphenhydrAMINE (BENADRYL) 25 mg capsule Take 50 mg by mouth two (2) times daily as needed.  NALOXONE HCL (NARCAN IJ) 1 Dose by Injection route as needed. For accidental overdose      EPINEPHrine (AUVI-Q) 0.3 mg/0.3 mL (1:1,000) injection 0.3 mL by IntraMUSCular route once as needed for Other (severe allergic reaction ) for 1 dose. 0.6 mL 1        Allergies   Allergen Reactions    Augmentin [Amoxicillin-Pot Clavulanate] Anaphylaxis    Amoxicillin Hives and Itching     Itching of throat    Ciprofloxacin Other (comments)     Redness of arm above IV site    Darvocet A500 [Propoxyphene N-Acetaminophen] Hives    Macrobid [Nitrofurantoin Monohyd/M-Cryst] Hives    Nubain [Nalbuphine] Hives and Other (comments)     Muscle spasms    Phenergan [Promethazine] Other (comments)     Makes me feel like Im on fire from the inside out and causes involuntary muscle spasms.      Toradol [Ketorolac Tromethamine] Itching    Vibramycin [Doxycycline Calcium] Hives and Itching     Itching of the throat       Social History     Tobacco Use    Smoking status: Former Smoker     Packs/day: 0.50     Years: 10.00     Pack years: 5.00    Smokeless tobacco: Never Used   Substance Use Topics    Alcohol use: Yes     Comment: 1 every 2 months    Drug use: No       Family History   Problem Relation Age of Onset    Post-op Nausea/Vomiting Sister     Malignant Hyperthermia Neg Hx     Pseudocholinesterase Deficiency Neg Hx  Delayed Awakening Neg Hx     Emergence Delirium Neg Hx     Post-op Cognitive Dysfunction Neg Hx        Review of Systems:  Pertinent positives and negatives as noted otherwise comprehensive review is negative. Physical Examination:    Visit Vitals  /78 (BP 1 Location: Left arm, BP Patient Position: Sitting)   Pulse (!) 126   Temp 100.1 °F (37.8 °C) (Oral)   Resp 22   Ht 5' 1\" (1.549 m)   Wt 59.4 kg (131 lb)   SpO2 97%   BMI 24.75 kg/m²     She is a pleasant lady. Awake alert oriented. She is a bit anxious today. No icterus. No edema. She has no facial asymmetry. She has postural tremor. Tone is increased in the lower extremities mild degree bilaterally. No cogwheeling. No rest tremor. She resists fully to direct conversational testing upper and lower extremities. Reflexes continue to be abnormally brisk with crossed adductors and clonus at the knees. 2-3 beats of self extinguishing clonus at the ankles. She is able to ambulate and gets onto her heels and toes    Impression/Plan  Abnormally brisk reflexes as noted above and we have excluded ictus as well as motor neuron disease. The CT scans of the cervical and thoracic spine of excluded a structural cause i.e. no evidence of cord compression but certainly did not exclude demyelination which is our biggest concern. We discussed her test results. She had several good questions today regarding the test that she had, differential diagnosis, symptoms etc.  We discussed and answered all his questions today at length. MRI of the brain was performed to look for demyelination which will give us a diagnosis if indeed present.  MRI of the brain was normal which makes demyelinating disease less likely as she gives no history of visual disturbance that would go along with a cord lesion i.e. neuromyelitis optica and she also gives no history of ever having any type of paraplegia etc. that would lead us to believe she had an episode of transverse myelitis and we have the structural abnormalities from before as noted. It does not however exclude fully a demyelinating lesion in the cord and we did discuss that. Signed By: Sherry Rahman NP        This note was created using voice recognition software. Despite editing, there may be syntax errors. This note will not be viewable in [address] I have reviewed the documentation provided by the nurse practitioner, Mrs. Akilah Zaragoza, and we have discussed her findings and the clinical impression. I have formulated with her the proposed management plans for this patient. Additionally,  I have personally evaluated the patient to verify the history and to confirm physical findings. Below are my additional comments:  Ms. Albert Luis returns today for follow-up regarding abnormal movements and brisk reflexes. She underwent MRI scan of the brain and I personally reviewed that. There is no abnormality there and certainly no evidence of demyelination or other process that would explain her spasticity. Serendipitously on the sagittal FLAIR images they were able to capture most of the cervical spine as well and that was normal.  She indicates that she continues to have tremor. She believes is worse if she is in more pain. Her pain medicines are being adjusted and her recent pain management notes are reviewed. She is going to start some pelvic floor therapy and she is going to start seeing a pain psychologist as well. We discussed her normal test results including the MRI of the brain, her CT scans, EEG, and EMG all without evidence of a neurologic issue. We discussed that she is in the frustrating yet reassuring state of not having an explanation for these symptoms but I reassured her that I find no evidence of any primary neurologic issue. Her reflexes are brisk but I do not have an anatomic explanation and some people are brisk and she is brisk and symmetrical with normal studies as stated.   At this point follow-up as needed    Corinne Pressman, MD  Total time: 25 min   Counseling / coordination time: 15 min   > 50% counseling / coordination?: Yes re: as documented above    This note will not be viewable in [address]

## 2018-11-26 NOTE — PROGRESS NOTES
Ross Talamantes is a 41 y.o. female in today for follow-up on MRI results. Learning assessment previously completed; primary language is [place]. 1. Have you been to the ER, urgent care clinic since your last visit? Hospitalized since your last visit? No    2. Have you seen or consulted any other health care providers outside of the [address] [place] [address] since your last visit? Include any pap smears or colon screening.  No

## 2018-12-03 ENCOUNTER — TELEPHONE (OUTPATIENT)
Dept: PAIN MANAGEMENT | Age: 43
End: 2018-12-03

## 2018-12-07 DIAGNOSIS — G89.4 CHRONIC PAIN SYNDROME: ICD-10-CM

## 2018-12-07 DIAGNOSIS — R10.84 GENERALIZED ABDOMINAL PAIN: ICD-10-CM

## 2018-12-07 DIAGNOSIS — N30.10 INTERSTITIAL CYSTITIS: ICD-10-CM

## 2018-12-07 NOTE — TELEPHONE ENCOUNTER
Crystal Shelley has called requesting a refill of their controlled medication, Methadone 5mg and Dilaudid 8mg, for the management of chronic pain. Last office visit date: 11/5/18 FU appt1/4/19  Last opioid care agreement 05/18  Last UDS was done 05/3/18    Date last  was pulled and reviewed : 12/6/18    Was the patient compliant when the above report was pulled? yes    Analgesia: Patient reports 40% pain relief on current regimen. Aberrancies: none noted in last 30 days. ADL's: Patient states they are able to do personal hygiene, but is unable to do cooking or cleaning with decrease in medication. Adverse Reaction: Patient reports no adverse reactions at this time. Provider's last note and plan of care reviewed? yes  Request forwarded to provider for review.

## 2018-12-10 RX ORDER — METHADONE HYDROCHLORIDE 5 MG/1
TABLET ORAL
Qty: 60 TAB | Refills: 0 | Status: SHIPPED | OUTPATIENT
Start: 2018-12-14 | End: 2019-01-04 | Stop reason: DRUGHIGH

## 2018-12-10 RX ORDER — HYDROMORPHONE HYDROCHLORIDE 8 MG/1
8 TABLET ORAL
Qty: 90 TAB | Refills: 0 | Status: SHIPPED | OUTPATIENT
Start: 2018-12-14 | End: 2019-01-04 | Stop reason: SDUPTHER

## 2018-12-17 ENCOUNTER — HOSPITAL ENCOUNTER (EMERGENCY)
Age: 43
Discharge: HOME | End: 2018-12-17
Attending: EMERGENCY MEDICINE
Payer: MEDICAID

## 2018-12-17 ENCOUNTER — APPOINTMENT (OUTPATIENT)
Dept: CT IMAGING | Age: 43
End: 2018-12-17
Attending: EMERGENCY MEDICINE
Payer: MEDICAID

## 2018-12-17 VITALS
HEART RATE: 101 BPM | OXYGEN SATURATION: 99 % | BODY MASS INDEX: 25.11 KG/M2 | TEMPERATURE: 98.9 F | WEIGHT: 133 LBS | RESPIRATION RATE: 16 BRPM | HEIGHT: 61 IN | SYSTOLIC BLOOD PRESSURE: 107 MMHG | DIASTOLIC BLOOD PRESSURE: 80 MMHG

## 2018-12-17 DIAGNOSIS — B97.89 VIRAL LARYNGITIS: Primary | ICD-10-CM

## 2018-12-17 DIAGNOSIS — J04.0 VIRAL LARYNGITIS: Primary | ICD-10-CM

## 2018-12-17 LAB
ANION GAP SERPL CALC-SCNC: 7 MMOL/L (ref 3–18)
BASOPHILS # BLD: 0 K/UL (ref 0–0.1)
BASOPHILS NFR BLD: 0 % (ref 0–2)
BUN SERPL-MCNC: 8 MG/DL (ref 7–18)
BUN/CREAT SERPL: 10
CALCIUM SERPL-MCNC: 8.4 MG/DL (ref 8.5–10.1)
CHLORIDE SERPL-SCNC: 101 MMOL/L (ref 100–108)
CO2 SERPL-SCNC: 28 MMOL/L (ref 21–32)
CREAT SERPL-MCNC: 0.84 MG/DL (ref 0.6–1.3)
DIFFERENTIAL METHOD BLD: ABNORMAL
EOSINOPHIL # BLD: 0 K/UL (ref 0–0.4)
EOSINOPHIL NFR BLD: 0 % (ref 0–5)
ERYTHROCYTE [DISTWIDTH] IN BLOOD BY AUTOMATED COUNT: 13.8 % (ref 11.6–14.5)
GLUCOSE SERPL-MCNC: 100 MG/DL (ref 74–99)
HCG SERPL QL: NEGATIVE
HCT VFR BLD AUTO: 37.4 % (ref 35–45)
HGB BLD-MCNC: 11.5 G/DL (ref 12–16)
LYMPHOCYTES # BLD: 1.1 K/UL (ref 0.9–3.6)
LYMPHOCYTES NFR BLD: 18 % (ref 21–52)
MCH RBC QN AUTO: 27.4 PG (ref 24–34)
MCHC RBC AUTO-ENTMCNC: 30.7 G/DL (ref 31–37)
MCV RBC AUTO: 89.3 FL (ref 74–97)
MONOCYTES # BLD: 0.3 K/UL (ref 0.05–1.2)
MONOCYTES NFR BLD: 5 % (ref 3–10)
NEUTS SEG # BLD: 4.5 K/UL (ref 1.8–8)
NEUTS SEG NFR BLD: 77 % (ref 40–73)
PLATELET # BLD AUTO: 223 K/UL (ref 135–420)
PMV BLD AUTO: 9.3 FL (ref 9.2–11.8)
POTASSIUM SERPL-SCNC: 3.4 MMOL/L (ref 3.5–5.5)
RBC # BLD AUTO: 4.19 M/UL (ref 4.2–5.3)
SODIUM SERPL-SCNC: 136 MMOL/L (ref 136–145)
WBC # BLD AUTO: 5.9 K/UL (ref 4.6–13.2)

## 2018-12-17 PROCEDURE — 96361 HYDRATE IV INFUSION ADD-ON: CPT

## 2018-12-17 PROCEDURE — 70491 CT SOFT TISSUE NECK W/DYE: CPT

## 2018-12-17 PROCEDURE — 74011636320 HC RX REV CODE- 636/320: Performed by: EMERGENCY MEDICINE

## 2018-12-17 PROCEDURE — 74011250637 HC RX REV CODE- 250/637: Performed by: EMERGENCY MEDICINE

## 2018-12-17 PROCEDURE — 96374 THER/PROPH/DIAG INJ IV PUSH: CPT

## 2018-12-17 PROCEDURE — 99285 EMERGENCY DEPT VISIT HI MDM: CPT

## 2018-12-17 PROCEDURE — 80048 BASIC METABOLIC PNL TOTAL CA: CPT

## 2018-12-17 PROCEDURE — 85025 COMPLETE CBC W/AUTO DIFF WBC: CPT

## 2018-12-17 PROCEDURE — 84703 CHORIONIC GONADOTROPIN ASSAY: CPT

## 2018-12-17 PROCEDURE — 74011250636 HC RX REV CODE- 250/636: Performed by: EMERGENCY MEDICINE

## 2018-12-17 RX ORDER — SODIUM CHLORIDE 0.9 % (FLUSH) 0.9 %
5-10 SYRINGE (ML) INJECTION EVERY 8 HOURS
Status: DISCONTINUED | OUTPATIENT
Start: 2018-12-17 | End: 2018-12-17 | Stop reason: HOSPADM

## 2018-12-17 RX ORDER — LIDOCAINE HYDROCHLORIDE 20 MG/ML
10 SOLUTION OROPHARYNGEAL
Status: DISCONTINUED | OUTPATIENT
Start: 2018-12-17 | End: 2018-12-17 | Stop reason: HOSPADM

## 2018-12-17 RX ORDER — CODEINE PHOSPHATE AND GUAIFENESIN 10; 100 MG/5ML; MG/5ML
5 SOLUTION ORAL
COMMUNITY
End: 2019-02-06

## 2018-12-17 RX ORDER — IBUPROFEN 800 MG/1
800 TABLET ORAL
COMMUNITY
End: 2019-02-06

## 2018-12-17 RX ORDER — SODIUM CHLORIDE 0.9 % (FLUSH) 0.9 %
5-10 SYRINGE (ML) INJECTION AS NEEDED
Status: DISCONTINUED | OUTPATIENT
Start: 2018-12-17 | End: 2018-12-17 | Stop reason: HOSPADM

## 2018-12-17 RX ADMIN — DIPHENHYDRAMINE HYDROCHLORIDE AND LIDOCAINE HYDROCHLORIDE AND ALUMINUM HYDROXIDE AND MAGNESIUM HYDRO 10 ML: KIT at 09:56

## 2018-12-17 RX ADMIN — METHYLPREDNISOLONE SODIUM SUCCINATE 125 MG: 125 INJECTION, POWDER, FOR SOLUTION INTRAMUSCULAR; INTRAVENOUS at 07:48

## 2018-12-17 RX ADMIN — SODIUM CHLORIDE 1000 ML: 900 INJECTION, SOLUTION INTRAVENOUS at 07:47

## 2018-12-17 RX ADMIN — IOPAMIDOL 100 ML: 612 INJECTION, SOLUTION INTRAVENOUS at 08:31

## 2018-12-17 NOTE — DISCHARGE INSTRUCTIONS
Laryngitis: Care Instructions  Your Care Instructions    Laryngitis is an inflammation of the voice box (larynx) that causes your voice to become raspy or hoarse. It can be short-lived or long-lasting. Most of the time, laryngitis comes on quickly and lasts as long as 2 weeks. It is caused by overuse, irritation, or infection of the vocal cords inside the larynx. Some of the most common causes are a cold, the flu, or allergies. Loud talking, shouting, cheering, or singing also can cause laryngitis. Stomach acid that backs up into the throat also can make you lose your voice. Resting your voice and taking other steps at home can help you get your voice back. Follow-up care is a key part of your treatment and safety. Be sure to make and go to all appointments, and call your doctor if you are having problems. It's also a good idea to know your test results and keep a list of the medicines you take. How can you care for yourself at home? · Follow your doctor's directions for treating the condition that caused you to lose your voice. If your doctor prescribed antibiotics, take them as directed. Do not stop taking them just because you feel better. You need to take the full course of antibiotics. · Before you use cough and cold medicines, check the label. They may not be safe for young children or for people with certain health problems. · Try to keep stomach acid from backing up into your throat. Do not eat just before bedtime. Reduce the amount of coffee and alcohol you drink, and eat healthy foods. Taking over-the-counter acid reducers can help when these steps are not enough. In some cases, you may need prescription medicine. · Rest your voice. You do not have to stop speaking, but use your voice as little as possible. Speak softly but do not whisper; whispering can bother your larynx more than speaking softly. Avoid talking on the telephone or trying to speak loudly. · Try not to clear your throat.  This can cause more irritation of your larynx. Take an over-the-counter cough suppressant (if your doctor recommends it) if you have a dry cough that does not produce mucus. · Do not smoke or allow others to smoke around you. If you need help quitting, talk to your doctor about stop-smoking programs and medicines. These can increase your chances of quitting for good. · Use a humidifier or vaporizer to add moisture to your bedroom. Humidity helps to thin the mucus in the nasal membranes that causes stuffiness or postnasal drip. Follow the directions for cleaning the machine. · Drink plenty of fluids, enough so that your urine is light yellow or clear like water. If you have kidney, heart, or liver disease and have to limit fluids, talk with your doctor before you increase the amount of fluids you drink. · Use saline (saltwater) nasal washes to help keep your nasal passages open and wash out mucus and bacteria. You can buy saline nose drops at a grocery store or drugstore. Or, you can make your own at home by mixing ½ teaspoon salt, 1 cup water (at room temperature), and ½ teaspoon baking soda. If you make your own, fill a bulb syringe with the solution, insert the tip into your nostril, and squeeze gently. [place] your nose. When should you call for help? Call 911 anytime you think you may need emergency care. For example, call if:    · You have trouble breathing.    Call your doctor now or seek immediate medical care if:    · You have new or worse pain.     · You have trouble swallowing.    Watch closely for changes in your health, and be sure to contact your doctor if:    · You do not get better as expected. Where can you learn more? Go to http://santos-tuan.info/. Enter M408 in the search box to learn more about \"Laryngitis: Care Instructions. \"  Current as of: March 28, 2018  Content Version: 11.8  © 6438-7268 [employer].  Care instructions adapted under license by [employer] (which disclaims liability or warranty for this information). If you have questions about a medical condition or this instruction, always ask your healthcare professional. [address] any warranty or liability for your use of this information.

## 2018-12-17 NOTE — ED TRIAGE NOTES
Pt to ED via personal vehicle for a sore throat. Pt was seen at [facility] 2 days ago for a viral illness. Pt reports difficulty swallowing and speaking. Pt SpO2 100% on RA, is able to speak in full, complete sentences to make needs known.

## 2018-12-17 NOTE — ED NOTES
Report received from [facility], pt resting on stretcher, call bell within reach, side rails up, no needs expressed at this time

## 2018-12-17 NOTE — ED NOTES
Pt discharged per ambulatory, no acute distress on discharge, written isnt given to pt, verbalizes understanding  Patient armband removed and shredded

## 2018-12-17 NOTE — ED PROVIDER NOTES
EMERGENCY DEPARTMENT HISTORY AND PHYSICAL EXAM    Date: 12/17/2018  Patient Name: Marie Pagan    History of Presenting Illness     Chief Complaint   Patient presents with    Sore Throat     History Provided By: Patient    Chief Complaint: Sore throat  Duration: 4 days  Timing:  Constant  Location: Throat  Quality: Stabbing  Severity: 8 out of 10  Modifying Factors: Talking exacerbates her pain  Associated Symptoms: throat swelling, difficulty swallowing, and cough    Additional History (Context):   6:31 AM  Marie Pagan is a 41 y.o. female with PMHX of interstitial cystitis, osteopenia, endometriosis, kidney stones, anxiety, depression, and fibromyalgia who presents to the emergency department C/O a stabbing 8/10 sore throat onset 4 days ago. Talking exacerbates her pain. Associated sxs include throat swelling, difficulty swallowing, and cough. Reports that she was seen at [facility] 2 days ago, was negative for strep and flu, and was prescribed Flonase, Motrin, and cough medicine. Pt quit tobacco 1 year ago and denies any EtOH or illicit drug use. Reports that her son was sick last week but did not have the same symptoms. Also states that her pediatric immunizations are UTD. Denies FHx of lupus, clotting disorders, and RA. Pt denies recent weight loss and any other sxs or complaints.      PCP: Dexter Root MD    Current Facility-Administered Medications   Medication Dose Route Frequency Provider Last Rate Last Dose    sodium chloride (NS) flush 5-10 mL  5-10 mL IntraVENous Q8H Norma Garzon MD        sodium chloride (NS) flush 5-10 mL  5-10 mL IntraVENous PRN Norma Garzon MD        magic mouthwash [facility]) oral suspension 10 mL  10 mL Oral NOW Norma Garzon MD        lidocaine (XYLOCAINE) 2 % viscous solution 10 mL  10 mL Mouth/Throat NOW Raul Rowe MD         Current Outpatient Medications   Medication Sig Dispense Refill    aluminum-magnesium hydroxide 200-200 mg/5 mL susp 5 mL, diphenhydrAMINE 12.5 mg/5 mL liqd 5 mL, lidocaine 2 % soln 5 mL 5 mL by Swish and Spit route two (2) times a day. Magic mouth wash   Maalox  Lidocaine 2% viscous   Diphenhydramine oral solution     Pharmacy to mix equal portions of ingredients to a total volume as indicated in the dispense amount.  guaiFENesin-codeine (ROBITUSSIN AC) 100-10 mg/5 mL solution Take 5 mL by mouth three (3) times daily as needed for Cough.  fluticasone (FLOVENT DISKUS) 50 mcg/actuation inhaler Take  by inhalation.  ibuprofen (MOTRIN) 800 mg tablet Take 800 mg by mouth.  methadone (DOLOPHINE) 5 mg tablet Take one tablet every 12 hours. Max total of 2 tablets a day. 60 Tab 0    HYDROmorphone (DILAUDID) 8 mg tablet Take 1 Tab by mouth three (3) times daily as needed for Pain for up to 30 days. Max Daily Amount: 24 mg. 90 Tab 0    pseudoephedrine (SUDAFED) 30 mg tablet Take  by mouth every four (4) hours as needed for Congestion.  cyclobenzaprine (FLEXERIL) 5 mg tablet Take 1 or 2 tablets as needed nightly for muscle spasm 60 Tab 2    phenazopyridine HCl (AZO PO) Take 1 Tab by mouth daily.  DULoxetine (CYMBALTA) 60 mg capsule Take 1 Cap by mouth two (2) times a day. 60 Cap 2    baclofen (LIORESAL) 10 mg tablet 1 or 2 tablets as needed up to 3 times daily. Maximum 5 tablets daily. 130 Tab 3    senna (SENNA) 8.6 mg tablet Take 1 Tab by mouth daily as needed for Constipation. 30 Tab 2    diphenhydrAMINE (BENADRYL) 25 mg capsule Take 50 mg by mouth two (2) times daily as needed.  NALOXONE HCL (NARCAN IJ) 1 Dose by Injection route as needed. For accidental overdose      EPINEPHrine (AUVI-Q) 0.3 mg/0.3 mL (1:1,000) injection 0.3 mL by IntraMUSCular route once as needed for Other (severe allergic reaction ) for 1 dose.  0.6 mL 1       Past History     Past Medical History:  Past Medical History:   Diagnosis Date    Anxiety     Arthritis     osteopenia    Chronic kidney disease     kidney stones    Chronic pain     bladder    Depression     Fibromyalgia     Interstitial cystitis     Nausea & vomiting     Other ill-defined conditions(799.89)     chronic interstitual cyctitis    Other ill-defined conditions(799.89)     endometriosis    Psychiatric disorder        Past Surgical History:  Past Surgical History:   Procedure Laterality Date    HX APPENDECTOMY      HX  SECTION      HX HEENT      oral surgery wisdom teeth extractiion    HX HYSTERECTOMY  2004    LAVH    HX PELVIC LAPAROSCOPY      x 5    HX UROLOGICAL      cysto and hydrodistention x 4    HX UROLOGICAL      Interstim implant    HX UROLOGICAL  2013    revision interstim       Family History:  Family History   Problem Relation Age of Onset    Post-op Nausea/Vomiting Sister     Malignant Hyperthermia Neg Hx     Pseudocholinesterase Deficiency Neg Hx     Delayed Awakening Neg Hx     Emergence Delirium Neg Hx     Post-op Cognitive Dysfunction Neg Hx        Social History:  Social History     Tobacco Use    Smoking status: Former Smoker     Packs/day: 0.50     Years: 10.00     Pack years: 5.00    Smokeless tobacco: Never Used   Substance Use Topics    Alcohol use: Yes     Comment: 1 every 2 months    Drug use: No       Allergies:   Allergies   Allergen Reactions    Augmentin [Amoxicillin-Pot Clavulanate] Anaphylaxis    Amoxicillin Hives and Itching     Itching of throat    Ciprofloxacin Other (comments) and Itching     Redness of arm above IV site    Darvocet A500 [Propoxyphene N-Acetaminophen] Hives    Doxycycline Unknown (comments)    Ketorolac Tromethamine Itching and Hives    Macrobid [Nitrofurantoin Monohyd/M-Cryst] Hives    Morphine Unknown (comments)     Pt states she's had morphine, she's not allergic    Nalbuphine Hives and Other (comments)     Muscle spasms    Phenergan [Promethazine] Other (comments)     Makes me feel like Im on fire from the inside out and causes involuntary muscle spasms.  Vibramycin [Doxycycline Calcium] Hives and Itching     Itching of the throat         Review of Systems   Review of Systems   Constitutional: Negative. Negative for chills, diaphoresis, fever and unexpected weight change. HENT: Positive for sore throat and trouble swallowing. Negative for congestion.         (+) throat swelling   Eyes: Negative. Negative for photophobia, pain and redness. Respiratory: Positive for cough. Negative for chest tightness, shortness of breath and wheezing. Cardiovascular: Negative. Negative for chest pain and palpitations. Gastrointestinal: Negative. Negative for abdominal pain, blood in stool, diarrhea, nausea and vomiting. Genitourinary: Negative for difficulty urinating, dysuria and frequency. Musculoskeletal: Negative. Negative for arthralgias, myalgias, neck pain and neck stiffness. Skin: Negative. Neurological: Negative. Negative for dizziness, tremors, seizures, syncope, speech difficulty, light-headedness and headaches. Psychiatric/Behavioral: Negative. Negative for confusion. The patient is not nervous/anxious. All other systems reviewed and are negative. Physical Exam     Vitals:    12/17/18 0700 12/17/18 0800 12/17/18 0900 12/17/18 0915   BP: 107/77 121/77 122/85    Pulse:       Resp:       Temp:       SpO2: 97% 99%  100%   Weight:       Height:         Physical Exam   Constitutional: She is oriented to person, place, and time. She appears well-developed and well-nourished. Non-toxic appearance. Very nervous and anxious, soft-spoken. HENT:   Head: Normocephalic and atraumatic. Eyes: EOM are normal. Pupils are equal, round, and reactive to light. Neck: Normal range of motion. Neck supple. Cardiovascular: Normal rate, normal heart sounds and intact distal pulses. Pulmonary/Chest: Effort normal and breath sounds normal. No respiratory distress. Abdominal: Soft.  Bowel sounds are normal. She exhibits no distension. Musculoskeletal: Normal range of motion. She exhibits no edema. Neurological: She is alert and oriented to person, place, and time. Skin: Skin is warm and dry. No rash noted. Psychiatric: Her behavior is normal. Her mood appears anxious. Nursing note and vitals reviewed. Diagnostic Study Results     Labs -     Recent Results (from the past 12 hour(s))   CBC WITH AUTOMATED DIFF    Collection Time: 12/17/18  7:38 AM   Result Value Ref Range    WBC 5.9 4.6 - 13.2 K/uL    RBC 4.19 (L) 4.20 - 5.30 M/uL    HGB 11.5 (L) 12.0 - 16.0 g/dL    HCT 37.4 35.0 - 45.0 %    MCV 89.3 74.0 - 97.0 FL    MCH 27.4 24.0 - 34.0 PG    MCHC 30.7 (L) 31.0 - 37.0 g/dL    RDW 13.8 11.6 - 14.5 %    PLATELET 091 116 - 110 K/uL    MPV 9.3 9.2 - 11.8 FL    NEUTROPHILS 77 (H) 40 - 73 %    LYMPHOCYTES 18 (L) 21 - 52 %    MONOCYTES 5 3 - 10 %    EOSINOPHILS 0 0 - 5 %    BASOPHILS 0 0 - 2 %    ABS. NEUTROPHILS 4.5 1.8 - 8.0 K/UL    ABS. LYMPHOCYTES 1.1 0.9 - 3.6 K/UL    ABS. MONOCYTES 0.3 0.05 - 1.2 K/UL    ABS. EOSINOPHILS 0.0 0.0 - 0.4 K/UL    ABS. BASOPHILS 0.0 0.0 - 0.1 K/UL    DF AUTOMATED     METABOLIC PANEL, BASIC    Collection Time: 12/17/18  7:38 AM   Result Value Ref Range    Sodium 136 136 - 145 mmol/L    Potassium 3.4 (L) 3.5 - 5.5 mmol/L    Chloride 101 100 - 108 mmol/L    CO2 28 21 - 32 mmol/L    Anion gap 7 3.0 - 18 mmol/L    Glucose 100 (H) 74 - 99 mg/dL    BUN 8 7.0 - 18 MG/DL    Creatinine 0.84 0.6 - 1.3 MG/DL    BUN/Creatinine ratio 10      GFR est AA >60 >60 ml/min/1.73m2    GFR est non-AA >60 >60 ml/min/1.73m2    Calcium 8.4 (L) 8.5 - 10.1 MG/DL   HCG QL SERUM    Collection Time: 12/17/18  7:38 AM   Result Value Ref Range    HCG, Ql. NEGATIVE  NEG         Radiologic Studies -   CT NECK SOFT TISSUE W CONT   Final Result   IMPRESSION:      1. No focal lesion or fluid collection is seen. 2. Prominent enhancement of nasopharyngeal and oropharyngeal mucosa, quite   nonspecific perhaps reflecting pharyngitis. As read by the radiologist.      CXR Results  (Last 48 hours)    None           Medications given in the ED-  Medications   sodium chloride (NS) flush 5-10 mL (not administered)   sodium chloride (NS) flush 5-10 mL (not administered)   magic mouthwash (FIRST-MOUTHWASH BLM) oral suspension 10 mL (not administered)   lidocaine (XYLOCAINE) 2 % viscous solution 10 mL (not administered)   sodium chloride 0.9 % bolus infusion 1,000 mL (1,000 mL IntraVENous New Bag 12/17/18 0747)   methylPREDNISolone (PF) (Solu-MEDROL) injection 125 mg (125 mg IntraVENous Given 12/17/18 0748)   iopamidol (ISOVUE 300) 61 % contrast injection 100 mL (100 mL IntraVENous Given 12/17/18 0831)         Medical Decision Making   I am the first provider for this patient. I reviewed the vital signs, available nursing notes, past medical history, past surgical history, family history and social history. Vital Signs-Reviewed the patient's vital signs. Pulse Oximetry Analysis - 99% on RA     Records Reviewed: Nursing Notes and Old Medical Records    Provider Notes (Medical Decision Making): No signs of impending airway compromise or respiratory distress, but during a somewhat stridor-like cough, she demonstrates significant pain and discomfort. Will run blood work and consider imaging for epiglottitis. Procedures:  Procedures    ED Course:   6:31 AM Initial assessment performed. The patients presenting problems have been discussed, and they are in agreement with the care plan formulated and outlined with them. I have encouraged them to ask questions as they arise throughout their visit. BEDSIDE TRANSFER OF CARE:  8:31 AM  Patient care will be transferred from [facility] PHOENIX Cohen MD to Temitope Begum MD.  Discussed available diagnostic results and care plan at length at beside. Patient and family made aware of provider change. All questions answered. Patient and family voiced understanding.   Written by ANNEL Ramirez as dictated by Quentin Garza. Susan Epps MD.    9:35 AM Assumed care of pt from Dr. Susan Epps and pt awaiting CT scan report. Appears in NAD on exam. Voice slightly hoarse. Seemed deliberate to me, however CT showed minimal non specific swelling of the pharynx. Pharynx exam is normal. Uvula is midline. Pt stated that she ran out of her pain medications on Friday. Supposed to see pain management tomorrow. She asked for something to numb her throat with. Gave her some viscous Lidocaine. Diagnosis and Disposition       DISCHARGE NOTE:  9:35 AM  Rodrigo Watson  results have been reviewed with her. She has been counseled regarding her diagnosis, treatment, and plan. She verbally conveys understanding and agreement of the signs, symptoms, diagnosis, treatment and prognosis and additionally agrees to follow up as discussed. She also agrees with the care-plan and conveys that all of her questions have been answered. I have also provided discharge instructions for her that include: educational information regarding their diagnosis and treatment, and list of reasons why they would want to return to the ED prior to their follow-up appointment, should her condition change. She has been provided with education for proper emergency department utilization. CLINICAL IMPRESSION:    1. Viral laryngitis        PLAN:  1. D/C Home  2. Current Discharge Medication List        3. Follow-up Information     Follow up With Specialties Details Why Contact Aleena Hayden MD [facility] Schedule an appointment as soon as possible for a visit in 2 days For Primary Care Follow Up [address] [id number]  [phone]      [facility] Emergency Medicine Go to If symptoms worsen, As needed [address] [address]  [phone]        _______________________________    Attestations:   This note is prepared by Soy Merida and Yeyo Jackson, acting as Scribe for Edgar. Beata Rowland MD.    Edgar. Beata Rowland MD:  The scribe's documentation has been prepared under my direction and personally reviewed by me in its entirety. I confirm that the note above accurately reflects all work, treatment, procedures, and medical decision making performed by me. This note is prepared by Tony Hoang, acting as Scribe for [employer], MD.    [employer], MD:  The scribe's documentation has been prepared under my direction and personally reviewed by me in its entirety.   I confirm that the note above accurately reflects all work, treatment, procedures, and medical decision making performed by me.  _______________________________

## 2018-12-19 ENCOUNTER — TELEPHONE (OUTPATIENT)
Dept: PAIN MANAGEMENT | Age: 43
End: 2018-12-19

## 2018-12-21 ENCOUNTER — TELEPHONE (OUTPATIENT)
Dept: PAIN MANAGEMENT | Age: 43
End: 2018-12-21

## 2018-12-21 NOTE — TELEPHONE ENCOUNTER
Hydromorphone 2mg was initially denied by [facility] - pt had filled a script between the time the pa was submitted and approved. [facility] wanted a more current mme from [place] . Resubmitted with latest  numbers and hydromorphone was approved. Called pt - left msg medication was approved. Faxed to pharmacy.

## 2019-01-04 ENCOUNTER — OFFICE VISIT (OUTPATIENT)
Dept: PAIN MANAGEMENT | Age: 44
End: 2019-01-04

## 2019-01-04 VITALS
BODY MASS INDEX: 25.11 KG/M2 | TEMPERATURE: 98.8 F | HEART RATE: 116 BPM | HEIGHT: 61 IN | WEIGHT: 133 LBS | DIASTOLIC BLOOD PRESSURE: 78 MMHG | RESPIRATION RATE: 22 BRPM | SYSTOLIC BLOOD PRESSURE: 127 MMHG | OXYGEN SATURATION: 97 %

## 2019-01-04 DIAGNOSIS — R10.84 GENERALIZED ABDOMINAL PAIN: ICD-10-CM

## 2019-01-04 DIAGNOSIS — Z79.899 ENCOUNTER FOR LONG-TERM (CURRENT) USE OF HIGH-RISK MEDICATION: Primary | ICD-10-CM

## 2019-01-04 DIAGNOSIS — M79.7 FIBROMYALGIA: ICD-10-CM

## 2019-01-04 DIAGNOSIS — G89.4 CHRONIC PAIN SYNDROME: ICD-10-CM

## 2019-01-04 DIAGNOSIS — N30.10 INTERSTITIAL CYSTITIS: ICD-10-CM

## 2019-01-04 LAB
ALCOHOL UR POC: NORMAL
AMPHETAMINES UR POC: NEGATIVE
BARBITURATES UR POC: NEGATIVE
BENZODIAZEPINES UR POC: NEGATIVE
BUPRENORPHINE UR POC: NEGATIVE
CANNABINOIDS UR POC: NEGATIVE
CARISOPRODOL UR POC: NORMAL
COCAINE UR POC: NEGATIVE
FENTANYL UR POC: NORMAL
MDMA/ECSTASY UR POC: NEGATIVE
METHADONE UR POC: NORMAL
METHAMPHETAMINE UR POC: NEGATIVE
METHYLPHENIDATE UR POC: NORMAL
OPIATES UR POC: NORMAL
OXYCODONE UR POC: NEGATIVE
PHENCYCLIDINE UR POC: NORMAL
PROPOXYPHENE UR POC: NORMAL
TRAMADOL UR POC: NORMAL
TRICYCLICS UR POC: NEGATIVE

## 2019-01-04 RX ORDER — HYDROMORPHONE HYDROCHLORIDE 8 MG/1
8 TABLET ORAL
Qty: 90 TAB | Refills: 0 | Status: SHIPPED | OUTPATIENT
Start: 2019-01-04 | End: 2019-02-06 | Stop reason: SDUPTHER

## 2019-01-04 RX ORDER — METHADONE HYDROCHLORIDE 5 MG/1
TABLET ORAL
Qty: 30 TAB | Refills: 0 | Status: SHIPPED | OUTPATIENT
Start: 2019-01-04 | End: 2019-04-05

## 2019-01-04 RX ORDER — NALOXONE HYDROCHLORIDE 4 MG/.1ML
SPRAY NASAL
Qty: 1 EACH | Refills: 1 | Status: SHIPPED | OUTPATIENT
Start: 2019-01-04

## 2019-01-04 NOTE — PROGRESS NOTES
Internal Medicine  Progress Note  Today's Date:  2019   Patient:  Berlin Cash  Patient :  1975    Subjective:     Chief Complaint   Patient presents with    Back Pain    Abdominal Pain    Knee Pain    Ankle Pain       HPI: Berlin Cash is a 41 y.o.  female who presents for follow up. Unclear as to why  Pt is scheduled with me, she was last seen  with Dr. Franklyn Moncada. Pt being actively weaned at that time with a treatment plan tentatively in place. Pt concerned about any further weaning of her medication at this time. Pt relates she is to start PT for pelvic floor pain of the  of this month. Pt with diffuse pain complaints. She is relating recurrent tremors as well. She states at this point the methadone is not helping her pain at all. Pt has had 3 visits with pain psychologist, but does at this point she is not getting much benefit. Note:She relates presently recovering from an upper respiratory infection.           Past Medical History:   Diagnosis Date    Anxiety     Arthritis     osteopenia    Chronic kidney disease     kidney stones    Chronic pain     bladder    Depression     Fibromyalgia     Interstitial cystitis     Nausea & vomiting     Other ill-defined conditions(799.89)     chronic interstitual cyctitis    Other ill-defined conditions(799.89)     endometriosis    Psychiatric disorder      Past Surgical History:   Procedure Laterality Date    HX APPENDECTOMY      HX  SECTION      HX HEENT      oral surgery wisdom teeth extractiion    HX HYSTERECTOMY      LAVH    HX PELVIC LAPAROSCOPY      x 5    HX UROLOGICAL      cysto and hydrodistention x 4    HX UROLOGICAL      Interstim implant    HX UROLOGICAL  2013    revision interstim       REVIEW OF SYSTEMS:   Constitutional - no wt loss, night sweats, unexplained fevers  Oral - no mouth pain, tongue or tooth problems, no swallowing problems   Cardiac - no CP, PND, orthopnea, palpitations or syncope  Resp - no wheezing, chronic coughing, dyspnea  GI - no heartburn, nausea, vomiting,constipation, diarrhea,bleeding.  - no dysuria, hematuria, urgency, frequency  Ortho - as noted above  Derm - no  rashes, lesions. Psych - denies any anxiety or depression symptoms, no hallucinations, suicidal, or violent ideation  Neuro - no focal weakness,incoordination, ataxia, involuntary movements  Endo - no polyuria, polydipsia, nocturia, hot flashes        Calculated MEQ - 126  Naloxone rescue - yes  Prophylactic bowel program - yes  PHQ-9- 2  COMM SCORE- 1  PHQ over the last two weeks 1/4/2019   PHQ Not Done -   Little interest or pleasure in doing things Several days   Feeling down, depressed, irritable, or hopeless Several days   Total Score PHQ 2 2   Trouble falling or staying asleep, or sleeping too much -   Feeling tired or having little energy -   Poor appetite, weight loss, or overeating -   Feeling bad about yourself - or that you are a failure or have let yourself or your family down -   Trouble concentrating on things such as school, work, reading, or watching TV -   Moving or speaking so slowly that other people could have noticed; or the opposite being so fidgety that others notice -   Thoughts of being better off dead, or hurting yourself in some way -   PHQ 9 Score -   How difficult have these problems made it for you to do your work, take care of your home and get along with others -         Ana Cervantes MD  [address] [address]    Current Outpatient Meds and Allergies     Current Outpatient Medications on File Prior to Visit   Medication Sig Dispense Refill    pseudoephedrine (SUDAFED) 30 mg tablet Take  by mouth every four (4) hours as needed for Congestion.  cyclobenzaprine (FLEXERIL) 5 mg tablet Take 1 or 2 tablets as needed nightly for muscle spasm 60 Tab 2    phenazopyridine HCl (AZO PO) Take 1 Tab by mouth daily.       DULoxetine (CYMBALTA) 60 mg capsule Take 1 Cap by mouth two (2) times a day. 60 Cap 2    baclofen (LIORESAL) 10 mg tablet 1 or 2 tablets as needed up to 3 times daily. Maximum 5 tablets daily. 130 Tab 3    diphenhydrAMINE (BENADRYL) 25 mg capsule Take 50 mg by mouth two (2) times daily as needed.  NALOXONE HCL (NARCAN IJ) 1 Dose by Injection route as needed. For accidental overdose      EPINEPHrine (AUVI-Q) 0.3 mg/0.3 mL (1:1,000) injection 0.3 mL by IntraMUSCular route once as needed for Other (severe allergic reaction ) for 1 dose. 0.6 mL 1    aluminum-magnesium hydroxide 200-200 mg/5 mL susp 5 mL, diphenhydrAMINE 12.5 mg/5 mL liqd 5 mL, lidocaine 2 % soln 5 mL 5 mL by Swish and Spit route two (2) times a day. Magic mouth wash   Maalox  Lidocaine 2% viscous   Diphenhydramine oral solution     Pharmacy to mix equal portions of ingredients to a total volume as indicated in the dispense amount.  guaiFENesin-codeine (ROBITUSSIN AC) 100-10 mg/5 mL solution Take 5 mL by mouth three (3) times daily as needed for Cough.  fluticasone (FLOVENT DISKUS) 50 mcg/actuation inhaler Take  by inhalation.  ibuprofen (MOTRIN) 800 mg tablet Take 800 mg by mouth.  senna (SENNA) 8.6 mg tablet Take 1 Tab by mouth daily as needed for Constipation. 30 Tab 2     No current facility-administered medications on file prior to visit.           Allergies   Allergen Reactions    Augmentin [Amoxicillin-Pot Clavulanate] Anaphylaxis    Amoxicillin Hives and Itching     Itching of throat    Ciprofloxacin Other (comments) and Itching     Redness of arm above IV site    Darvocet A500 [Propoxyphene N-Acetaminophen] Hives    Doxycycline Unknown (comments)    Ketorolac Tromethamine Itching and Hives    Macrobid [Nitrofurantoin Monohyd/M-Cryst] Hives    Morphine Unknown (comments)     Pt states she's had morphine, she's not allergic    Nalbuphine Hives and Other (comments)     Muscle spasms    Phenergan [Promethazine] Other (comments)     Makes me feel like Im on fire from the inside out and causes involuntary muscle spasms.  Vibramycin [Doxycycline Calcium] Hives and Itching     Itching of the throat          Objective:       BP Readings from Last 3 Encounters:   01/04/19 127/78   12/17/18 107/80   11/26/18 110/78     VS:    Visit Vitals  /78 (BP 1 Location: Right arm, BP Patient Position: Sitting)   Pulse (!) 116   Temp 98.8 °F (37.1 °C) (Oral)   Resp 22   Ht 5' 1\" (1.549 m)   Wt 60.3 kg (133 lb)   SpO2 97%   BMI 25.13 kg/m²       Body mass index is 25.13 kg/m². GENERAL: W/D, W/N, Female in no acute distress. APPEARANCE: Well groomed, Appropriately Dressed. ATTITUDE: Pleasant, Cooperative  Guarded. SPEECH: Normal Rate, Clear. AFFECT: Appropriate, worried, anxious  MOOD: Dysthymic. THOUGHT PROCESS: Linear, Logical, Normal Judgement and Insight. THOUGHT CONTENT: Normal  MEMORY: Grossly Intact. HEENT -- NCAT, Anicteric sclerae. Mus/Skel--  bulk and tone appear normal.  Derm-- no obvious abnormalities noted.         Results for orders placed or performed in visit on 01/04/19   AMB POC DRUG SCREEN ()   Result Value Ref Range    ALCOHOL UR POC      AMPHETAMINES UR POC Negative     CARISOPRODOL UR POC      COCAINE UR POC Negative     FENTANYL UR POC      MDMA/ECSTASY UR POC Negative     METHADONE UR POC Presumptive Positive     METHAMPHETAMINE UR POC Negative     METHYLPHENIDATE UR POC      OPIATES UR POC Presumptive Positive     OXYCODONE UR POC Negative     PHENCYCLIDINE UR POC      PROPOXYPHENE UR POC      TRAMADOL UR POC      TRICYCLICS UR POC Negative     BARBITURATES UR POC Negative     BENZODIAZEPINES UR POC Negative     CANNABINOIDS UR POC Negative     BUPRENORPHINE UR POC Negative        Assessment/Plan & Orders:     I have reviewed this patient's report generated by the [place] [facility] which does not demonstrate aberrancies and inconsistencies with regard to the historical prescribing of controlled medications to this patient by other providers. Problem List Items Addressed This Visit     Chronic pain syndrome    Relevant Medications    HYDROmorphone (DILAUDID) 8 mg tablet    Encounter for long-term (current) use of high-risk medication - Primary    Relevant Medications    methadone (DOLOPHINE) 5 mg tablet    naloxone (NARCAN) 4 mg/actuation nasal spray    Other Relevant Orders    DRUG SCREEN    AMB POC DRUG SCREEN () (Completed)    Fibromyalgia    Relevant Medications    methadone (DOLOPHINE) 5 mg tablet    HYDROmorphone (DILAUDID) 8 mg tablet    Generalized abdominal pain    Relevant Medications    HYDROmorphone (DILAUDID) 8 mg tablet    Interstitial cystitis    Relevant Medications    methadone (DOLOPHINE) 5 mg tablet    HYDROmorphone (DILAUDID) 8 mg tablet          Diagnoses and all orders for this visit:    1. Encounter for long-term (current) use of high-risk medication  Comments:  Continue methadone wean  Orders:  -     DRUG SCREEN  -     AMB POC DRUG SCREEN ()  -     methadone (DOLOPHINE) 5 mg tablet; 1 TAB DAILY  -     naloxone (NARCAN) 4 mg/actuation nasal spray; Use 1 spray intranasally, then discard. Repeat with new spray every 2 min as needed for opioid overdose symptoms, alternating nostrils. 2. Interstitial cystitis  -     methadone (DOLOPHINE) 5 mg tablet; 1 TAB DAILY  -     HYDROmorphone (DILAUDID) 8 mg tablet; Take 1 Tab by mouth three (3) times daily as needed for Pain for up to 30 days. Max Daily Amount: 24 mg.    3. Fibromyalgia    4. Chronic pain syndrome  -     HYDROmorphone (DILAUDID) 8 mg tablet; Take 1 Tab by mouth three (3) times daily as needed for Pain for up to 30 days. Max Daily Amount: 24 mg.    5. Generalized abdominal pain  -     HYDROmorphone (DILAUDID) 8 mg tablet; Take 1 Tab by mouth three (3) times daily as needed for Pain for up to 30 days. Max Daily Amount: 24 mg. MME =  111   Post reduction in medication.     Follow-up Disposition:  Return in about 1 month (around 2/4/2019) for further treatment with Dr. Wendy Puga. Reviewed with patient the treatment plan, goals of treatment plan, and limitations of treatment plan, to include the potential for side effects from medications and procedures. If side effects occur, it is the responsibility of the patient to inform the clinic so that a change in the treatment plan can be made in a safe manner. The patient is advised that stopping prescribed medication may cause an increase in symptoms and possible medication withdrawal symptoms. The patient is informed an emergency room evaluation may be necessary if this occurs. Had lengthy discussion with Pt regarding the direction of this facility away from opioids being the primary treatment option, and the need for exhaustion all other potential options to address her pain    GOALS:  To establish complementary and integrative plan of care to address chronic pain issues while minimizing pharmaceuticals to maximize patient's function improve quality of life.     Education:  Patient again educated on the importance of strict compliance with the opioid care agreement while on opioid therapy. Patient also again educated that they should avoid driving while on chronic opioid therapy. Also advised to avoid alcohol and to avoid benzodiazepines while on opioid therapy. Patient verbalized understanding and is in agreement with treatment plan as outlined above. All questions answered.         Savita Carmen MD

## 2019-01-04 NOTE — PROGRESS NOTES
Nursing Notes    Patient presents to the office today in follow-up. Patient rates her pain at 7/10 on the numerical pain scale. Reviewed medications with counts as follows:    Rx Date filled Qty Dispensed Pill Count Last Dose Short     Methadone 5mg 12/17/18 60 23 This am  No    Dilaudid 8mg  12/17/18 90 35 This am  No                                   Last opioid agreement today   Last urine drug screen today     Comments:     POC UDS was performed in office today per verbal order of provider ([facility]); rbv'd. Any new labs or imaging since last appointment? NO    Have you been to an emergency room (ER) or urgent care clinic since your last visit? YES; emergency dept x 2 due to upper respiratory infection            Have you been hospitalized since your last visit? NO     If yes, where, when, and reason for visit? Have you seen or consulted any other health care providers outside of the [address] [place] [address]  since your last visit? YES     If yes, where, when, and reason for visit? Emergency dept physicians  Ms. Jennifer Larose has a reminder for a \"due or due soon\" health maintenance. I have asked that she contact her primary care provider for follow-up on this health maintenance.     PHQ over the last two weeks 1/4/2019   PHQ Not Done -   Little interest or pleasure in doing things Several days   Feeling down, depressed, irritable, or hopeless Several days   Total Score PHQ 2 2   Trouble falling or staying asleep, or sleeping too much -   Feeling tired or having little energy -   Poor appetite, weight loss, or overeating -   Feeling bad about yourself - or that you are a failure or have let yourself or your family down -   Trouble concentrating on things such as school, work, reading, or watching TV -   Moving or speaking so slowly that other people could have noticed; or the opposite being so fidgety that others notice -   Thoughts of being better off dead, or hurting yourself in some way -   PHQ 9 Score - How difficult have these problems made it for you to do your work, take care of your home and get along with others -

## 2019-02-06 ENCOUNTER — OFFICE VISIT (OUTPATIENT)
Dept: PAIN MANAGEMENT | Age: 44
End: 2019-02-06

## 2019-02-06 VITALS
SYSTOLIC BLOOD PRESSURE: 104 MMHG | HEART RATE: 114 BPM | WEIGHT: 133 LBS | BODY MASS INDEX: 25.11 KG/M2 | DIASTOLIC BLOOD PRESSURE: 74 MMHG | TEMPERATURE: 100.2 F | HEIGHT: 61 IN | RESPIRATION RATE: 14 BRPM | OXYGEN SATURATION: 99 %

## 2019-02-06 DIAGNOSIS — Z79.899 ENCOUNTER FOR LONG-TERM (CURRENT) USE OF HIGH-RISK MEDICATION: ICD-10-CM

## 2019-02-06 DIAGNOSIS — N30.10 INTERSTITIAL CYSTITIS: Primary | ICD-10-CM

## 2019-02-06 DIAGNOSIS — R10.84 GENERALIZED ABDOMINAL PAIN: ICD-10-CM

## 2019-02-06 DIAGNOSIS — M79.10 MYALGIA: ICD-10-CM

## 2019-02-06 DIAGNOSIS — M62.838 MUSCLE SPASM: ICD-10-CM

## 2019-02-06 DIAGNOSIS — G89.4 CHRONIC PAIN SYNDROME: ICD-10-CM

## 2019-02-06 RX ORDER — CYCLOBENZAPRINE HCL 5 MG
TABLET ORAL
Qty: 60 TAB | Refills: 2 | Status: SHIPPED | OUTPATIENT
Start: 2019-02-06 | End: 2019-04-29 | Stop reason: SDUPTHER

## 2019-02-06 RX ORDER — DULOXETIN HYDROCHLORIDE 60 MG/1
60 CAPSULE, DELAYED RELEASE ORAL 2 TIMES DAILY
Qty: 60 CAP | Refills: 2 | Status: SHIPPED | OUTPATIENT
Start: 2019-02-06 | End: 2019-04-29 | Stop reason: SDUPTHER

## 2019-02-06 RX ORDER — BACLOFEN 10 MG/1
TABLET ORAL
Qty: 120 TAB | Refills: 2 | Status: SHIPPED | OUTPATIENT
Start: 2019-02-06 | End: 2019-04-29 | Stop reason: SDUPTHER

## 2019-02-06 RX ORDER — HYDROMORPHONE HYDROCHLORIDE 8 MG/1
8 TABLET ORAL
Qty: 90 TAB | Refills: 0 | Status: SHIPPED | OUTPATIENT
Start: 2019-02-12 | End: 2019-03-06 | Stop reason: SDUPTHER

## 2019-02-06 NOTE — PATIENT INSTRUCTIONS
Safe Use of Opioid Pain Medicine: Care Instructions  Your Care Instructions  Pain is your body's way of warning you that something is wrong. Pain feels different for everybody. Only you can describe your pain. A doctor can suggest or prescribe many types of medicines for pain. These range from over-the-counter medicines like acetaminophen (Tylenol) to powerful medicines called opioids. Examples of opioids are fentanyl, hydrocodone, morphine, and oxycodone. Heroin is an illegal opioid  Opioids are strong medicines. They can help you manage pain when you use them the right way. But if you misuse them, they can cause serious harm and even death. For these reasons, doctors are very careful about how they prescribe opioids. If you decide to take opioids, here are some things to remember. · Keep your doctor informed. You can get addicted to opioids. The risk is higher if you have a history of substance use. Your doctor will monitor you closely for signs of misuse and addiction and to figure out when you no longer need to take opioids. · Make a treatment plan. The goal of your plan is to be able to function and do the things you need to do, even if you still have some pain. You might be able to manage your pain with other non-opioid options like physical therapy, relaxation, or over-the-counter pain medicines. · Be aware of the side effects. Opioids can cause serious side effects, such as constipation, dry mouth, and nausea. And over time, you may need a higher dose to get pain relief. This is called tolerance. Your body also gets used to opioids. This is called physical dependence. If you suddenly stop taking them, you may have withdrawal symptoms. The doctor carefully considered what pain medicine is right for you. You may not have received opioids if your doctor was concerned about drug interactions or your safety, or if he or she had other concerns. It is best to have one doctor or clinic treat your pain. This way you will get the pain medicine that will help you the most. And a doctor will be able to watch for any problems that the medicine might cause. The doctor has checked you carefully, but problems can develop later. If you notice any problems or new symptoms,  get medical treatment right away. Follow-up care is a key part of your treatment and safety. Be sure to make and go to all appointments, and call your doctor if you are having problems. It's also a good idea to know your test results and keep a list of the medicines you take. How can you care for yourself at home? If you need to take opioids to manage your pain, remember these safety tips. · Follow directions carefully. It's easy to misuse opioids if you take a dose other than what's prescribed by your doctor. This can lead to overdose and even death. Even sharing them with someone they weren't meant for is misuse. · Be cautious. Opioids may affect your judgment and decision making. Do not drive or operate machinery until you can think clearly. Talk with your doctor about when it is safe to drive. · Reduce the risk of drug interactions. Opioids can be dangerous if you take them with alcohol or with certain drugs like sleeping pills and muscle relaxers. Make sure your doctor knows about all the other medicines you take, including over-the-counter medicines. Don't start any new medicines before you talk to your doctor or pharmacist.  · Safely store and dispose of opioids. Store opioids in a safe and secure place. Make sure that pets, children, friends, and family can't get to them. When you're done using opioids, make sure to dispose of them safely and as quickly as possible. The U.S. Food and Drug Administration (FDA) recommends these disposal options. ? The best option is to take your medicine to a drop-off box or take-back program that is authorized by the [address] ([facility]).   ? If these programs aren't available in your area and your medicine doesn't have specific disposal instructions (such as flushing), you can throw them into your household trash if you follow the FDA's instructions. Visit fda.gov and search for \"unused medicine disposal.\"  ? If you have opioid patches (used or unused), your options are to take them to a [facility]-authorized site or flush them down the toilet. Do not throw them in the trash. ? Only flush your medicine down the toilet if you can't get to a [facility]-approved site or your medicine instructions state clearly to flush them. · Reduce the risk of overdose. Misuse of opioids can be very dangerous. Protect yourself by asking your doctor about a naloxone rescue kit. It can help you--and even save your life--if you take too much of an opioid. Try other ways to reduce pain. · Relax, and reduce stress. Relaxation techniques such as deep breathing or meditation can help. · Keep moving. Gentle, daily exercise can help reduce pain over the long run. Try low- or no-impact exercises such as walking, swimming, and stationary biking. Do stretches to stay flexible. · Try heat, cold packs, and massage. · Get enough sleep. Pain can make you tired and drain your energy. Talk with your doctor if you have trouble sleeping because of pain. · Think positive. Your thoughts can affect your pain level. Do things that you enjoy to distract yourself when you have pain instead of focusing on the pain. See a movie, read a book, listen to music, or spend time with a friend. If you are not taking a prescription pain medicine, ask your doctor if you can take an over-the-counter medicine. When should you call for help?   Call your doctor now or seek immediate medical care if:    · You have a new kind of pain.     · You have new symptoms, such as a fever or rash, along with the pain.    Watch closely for changes in your health, and be sure to contact your doctor if:    · You think you might be using too much pain medicine, and you need help to use less or stop.     · Your pain gets worse.     · You would like a referral to a doctor or clinic that specializes in pain management. Where can you learn more? Go to http://santos-tuan.info/. Enter R108 in the search box to learn more about \"Safe Use of Opioid Pain Medicine: Care Instructions. \"  Current as of: Masha 3, 2018  Content Version: 11.9  © 5728-6999 [employer]. Care instructions adapted under license by [employer] (which disclaims liability or warranty for this information). If you have questions about a medical condition or this instruction, always ask your healthcare professional. [address] any warranty or liability for your use of this information. Preventing Falls: Care Instructions  Your Care Instructions    Getting around your home safely can be a challenge if you have injuries or health problems that make it easy for you to fall. Loose rugs and furniture in walkways are among the dangers for many older people who have problems walking or who have poor eyesight. People who have conditions such as arthritis, osteoporosis, or dementia also have to be careful not to fall. You can make your home safer with a few simple measures. Follow-up care is a key part of your treatment and safety. Be sure to make and go to all appointments, and call your doctor if you are having problems. It's also a good idea to know your test results and keep a list of the medicines you take. How can you care for yourself at home? Taking care of yourself  · You may get dizzy if you do not drink enough water. To prevent dehydration, drink plenty of fluids, enough so that your urine is light yellow or clear like water. Choose water and other caffeine-free clear liquids. If you have kidney, heart, or liver disease and have to limit fluids, talk with your doctor before you increase the amount of fluids you drink.   · Exercise regularly to improve your strength, muscle tone, and balance. Walk if you can. Swimming may be a good choice if you cannot walk easily. · Have your vision and hearing checked each year or any time you notice a change. If you have trouble seeing and hearing, you might not be able to avoid objects and could lose your balance. · Know the side effects of the medicines you take. Ask your doctor or pharmacist whether the medicines you take can affect your balance. Sleeping pills or sedatives can affect your balance. · Limit the amount of alcohol you drink. Alcohol can impair your balance and other senses. · Ask your doctor whether calluses or corns on your feet need to be removed. If you wear loose-fitting shoes because of calluses or corns, you can lose your balance and fall. · Talk to your doctor if you have numbness in your feet. Preventing falls at home  · Remove raised doorway thresholds, throw rugs, and clutter. Repair loose carpet or raised areas in the floor. · Move furniture and electrical cords to keep them out of walking paths. · Use nonskid floor wax, and wipe up spills right away, especially on ceramic tile floors. · If you use a walker or cane, put rubber tips on it. If you use crutches, clean the bottoms of them regularly with an abrasive pad, such as steel wool. · Keep your house well lit, especially [facility], and outside walkways. Use night-lights in areas such as hallways and bathrooms. Add extra light switches or use remote switches (such as switches that go on or off when you clap your hands) to make it easier to turn lights on if you have to get up during the night. · Install sturdy handrails on stairways. · Move items in your cabinets so that the things you use a lot are on the lower shelves (about waist level). · Keep a cordless phone and a flashlight with new batteries by your bed.  If possible, put a phone in each of the main rooms of your house, or carry a cell phone in case you fall and cannot reach a phone. Or, you can wear a device around your neck or wrist. You push a button that sends a signal for help. · Wear low-heeled shoes that fit well and give your feet good support. Use footwear with nonskid soles. Check the heels and soles of your shoes for wear. Repair or replace worn heels or soles. · Do not wear socks without shoes on wood floors. · Walk on the grass when the sidewalks are slippery. If you live in an area that gets snow and ice in the winter, sprinkle salt on slippery steps and sidewalks. Preventing falls in the bath  · Install grab bars and nonskid mats inside and outside your shower or tub and near the toilet and sinks. · Use shower chairs and bath benches. · Use a hand-held shower head that will allow you to sit while showering. · Get into a tub or shower by putting the weaker leg in first. Get out of a tub or shower with your strong side first.  · Repair loose toilet seats and consider installing a raised toilet seat to make getting on and off the toilet easier. · Keep your bathroom door unlocked while you are in the shower. Where can you learn more? Go to http://santos-tuan.info/. Enter [id number] [id number] [phone] in the search box to learn more about \"Preventing Falls: Care Instructions. \"  Current as of: March 15, 2018  Content Version: 11.9  © 3712-7028 [employer]. Care instructions adapted under license by [employer] (which disclaims liability or warranty for this information). If you have questions about a medical condition or this instruction, always ask your healthcare professional. [place] [address] any warranty or liability for your use of this information.

## 2019-02-06 NOTE — PROGRESS NOTES
Referral date before 2011, source PCP Dr Maribeth Canas and for chronic pelvic pain due to interstitial cystitis. HPI:  Sveta Webb is a 41 y.o. female here for f/u visit for ongoing evaluation of chronic pelvis pain due to interstitial cystitis. Hx implanted InterStim which required revision in 2013. Pt was last seen here on 1/4/19 with Dr Kash Deal and 11/5/18 with Dr Juarez Romero. Pt denies interval changes on the character or distribution of pain. Pain is located like abelt all the way around her trunk. The pain is described as aching to stabbing. Pain at its best is 4/10. Pain at its worse is 10/10. The pain is worsened by pelvic floor PT done 9 years ago. Symptoms are improved by Cymbalta, Baclofen, Flexeril and Dilaudid. Pt reports no relief currently from Methadone. Pt has tried motrin, tylenol and topical compound cream with no perceived benefit. Lyrica with significant weight gain across her abdomen causing increased pain. Since last visit, she has followed up with neurology as recommended; she states \"they didn't find anything\". She reports still having tremors and hyperactive reflexes. She states they could only do a MRI of her brain and MS can't be completely rulled out due to needing MRI of her spine but they are unable to due to her implant.       Social History     Socioeconomic History    Marital status:      Spouse name: Not on file    Number of children: Not on file    Years of education: Not on file    Highest education level: Not on file   Social Needs    Financial resource strain: Not on file    Food insecurity - worry: Not on file    Food insecurity - inability: Not on file    Transportation needs - medical: Not on file   [employer] needs - non-medical: Not on file   Occupational History    Not on file   Tobacco Use    Smoking status: Former Smoker     Packs/day: 0.50     Years: 10.00     Pack years: 5.00    Smokeless tobacco: Never Used   Substance and Sexual Activity    Alcohol use: Yes     Comment: 1 every 2 months    Drug use: No    Sexual activity: Not on file     Comment: hysterectomy   Other Topics Concern    Not on file   Social History Narrative    Not on file     Family History   Problem Relation Age of Onset    Post-op Nausea/Vomiting Sister     Malignant Hyperthermia Neg Hx     Pseudocholinesterase Deficiency Neg Hx     Delayed Awakening Neg Hx     Emergence Delirium Neg Hx     Post-op Cognitive Dysfunction Neg Hx      Allergies   Allergen Reactions    Augmentin [Amoxicillin-Pot Clavulanate] Anaphylaxis    Amoxicillin Hives and Itching     Itching of throat    Ciprofloxacin Other (comments) and Itching     Redness of arm above IV site    Darvocet A500 [Propoxyphene N-Acetaminophen] Hives    Doxycycline Unknown (comments)    Ketorolac Tromethamine Itching and Hives    Macrobid [Nitrofurantoin Monohyd/M-Cryst] Hives    Morphine Unknown (comments)     Pt states she's had morphine, she's not allergic    Nalbuphine Hives and Other (comments)     Muscle spasms    Phenergan [Promethazine] Other (comments)     Makes me feel like Im on fire from the inside out and causes involuntary muscle spasms.      Vibramycin [Doxycycline Calcium] Hives and Itching     Itching of the throat     Past Medical History:   Diagnosis Date    Anxiety     Arthritis     osteopenia    Chronic kidney disease     kidney stones    Chronic pain     bladder    Depression     Fibromyalgia     Interstitial cystitis     Nausea & vomiting     Other ill-defined conditions(799.89)     chronic interstitual cyctitis    Other ill-defined conditions(799.89)     endometriosis    Psychiatric disorder      Past Surgical History:   Procedure Laterality Date    HX APPENDECTOMY      HX  SECTION      HX HEENT      oral surgery wisdom teeth extractiion    HX HYSTERECTOMY  2004    [facility]    HX PELVIC LAPAROSCOPY      x 5    HX UROLOGICAL      cysto and hydrodistention x 4    HX UROLOGICAL      Interstim implant    HX UROLOGICAL  08/2013    revision interstim     Current Outpatient Medications on File Prior to Visit   Medication Sig    methadone (DOLOPHINE) 5 mg tablet 1 TAB DAILY    naloxone (NARCAN) 4 mg/actuation nasal spray Use 1 spray intranasally, then discard. Repeat with new spray every 2 min as needed for opioid overdose symptoms, alternating nostrils.  pseudoephedrine (SUDAFED) 30 mg tablet Take  by mouth every four (4) hours as needed for Congestion.  phenazopyridine HCl (AZO PO) Take 1 Tab by mouth daily.  diphenhydrAMINE (BENADRYL) 25 mg capsule Take 50 mg by mouth two (2) times daily as needed.  EPINEPHrine (AUVI-Q) 0.3 mg/0.3 mL (1:1,000) injection 0.3 mL by IntraMUSCular route once as needed for Other (severe allergic reaction ) for 1 dose.  senna (SENNA) 8.6 mg tablet Take 1 Tab by mouth daily as needed for Constipation. No current facility-administered medications on file prior to visit. ROS:  Reports fever (low grade, attributes to flu shot yesterday), nausea, diarrhea, abdominal pain, dizziness, depression and anxiety. Denies chills, vomiting, constipation, chest pain, shortness or breath/trouble breathing, weakness, trouble swallowing, changes in vision, changes in hearing, falls, bladder incontinence, bowel incontinence, suicidal ideations, homicidal ideations or alcohol use. Opioid specific risk: panic disorder, anxiety, depression, constipation     Opioid specific history: patient has been on continuous opioid therapy for approximately 6 years. She has not had symptoms of withdrawal in the past. She expresses a fear of developing withdrawal symptoms. She states that she would like to be opioid free.         Vitals:    02/06/19 0816   BP: 104/74   Pulse: (!) 114   Resp: 14   Temp: 100.2 °F (37.9 °C)   TempSrc: Oral   SpO2: 99%   Weight: 60.3 kg (133 lb)   Height: 5' 1\" (1.549 m)   PainSc:   7   PainLoc: Abdomen          Physical exam:  AFVSS with tachycardia, no acute distress, normal body habitus. A&OXs 3. Normocephalic, atraumatic. Conjugate gaze, clear sclerae. Speech is clear and appropriate. Mood is appropriate and patient is cooperative. Gait and balance are within functional limits. Non-labored breathing. Calculated MEQ - 111  Naloxone rescue - yes  Prophylactic bowel program - yes  Date of last OCA 1/8/19  Last UDS 1/4/19, consistent POC, pending confirmatory testing   date checked today, findings consistent    Primary Care Physician  [address] [address] [facility],[address]  [phone]    Today   Last Visit  PGIC - 2 & 8  2 & 4  TEE - 64%  62%  COMM - 14  17    PHQ -- . PHQ over the last two weeks 2/6/2019   PHQ Not Done Active Diagnosis of Depression or Bipolar Disorder   Little interest or pleasure in doing things Not at all   Feeling down, depressed, irritable, or hopeless Several days   Total Score PHQ 2 1   Trouble falling or staying asleep, or sleeping too much -   Feeling tired or having little energy -   Poor appetite, weight loss, or overeating -   Feeling bad about yourself - or that you are a failure or have let yourself or your family down -   Trouble concentrating on things such as school, work, reading, or watching TV -   Moving or speaking so slowly that other people could have noticed; or the opposite being so fidgety that others notice -   Thoughts of being better off dead, or hurting yourself in some way -   PHQ 9 Score -   How difficult have these problems made it for you to do your work, take care of your home and get along with others -       DSM V-OUD Screen--mild to moderate      Assessment/Plan:     ICD-10-CM ICD-9-CM    1. Interstitial cystitis N30.10 595.1 baclofen (LIORESAL) 10 mg tablet      HYDROmorphone (DILAUDID) 8 mg tablet   2. Myalgia M79.10 729.1 DULoxetine (CYMBALTA) 60 mg capsule      cyclobenzaprine (FLEXERIL) 5 mg tablet   3.  Muscle spasm L97.486 728.85 cyclobenzaprine (FLEXERIL) 5 mg tablet      baclofen (LIORESAL) 10 mg tablet   4. Chronic pain syndrome G89.4 338.4 baclofen (LIORESAL) 10 mg tablet      HYDROmorphone (DILAUDID) 8 mg tablet   5. Generalized abdominal pain R10.84 789.07 HYDROmorphone (DILAUDID) 8 mg tablet   6. Encounter for long-term (current) use of high-risk medication Z79.899 V58.69         Do not recommend long term opioid therapy for this patient at this time for their chronic pain; risks outweigh the potential benefits. Pt currently taking methadone 5mg daily and dilaudid 8mg up to 3 times a day as needed with a total of 90 tabs to be used over 30 days. Their MME is 111. Today, we will continue the weaning of patients opioid medication with a goal of being opioid free, pending safety and compliance. Pt instructed to call if they experience any signs of withdrawal (diarrhea, nausea, vomiting, sweating or chills, agitation, itching). Today, pt given prescription for dilaudid 8mg up to 3 times a day as needed with a total of 90 tabs to be used over 30 days; her methadone was discontinued. Their new MME is 96. Pt instructed to call 5-7 days before they run out of their medications for refill. At next office visit, the plan is to provide patient with dilaudid 8mg up to 3 times a day as needed with a total of 85 tabs to be budgeted over 30 days. If patient has difficulty with the wean or difficulty with cravings we will consider referral to mental health for ongoing assessment and treatment for opioid use disorder. Flexeril, Baclofen and Cymbalta refilled today. Will consider retrial of Elavil at next visit as patient will be off Methadone; pt states she does not recall trying this medication in the past.  Close follow up urology and neurology. Continue to discuss pain pump and SCS with patient.   Lidocaine and or ketamine infusions remain an option for trial.  Continue pelvic floor PT and pain psychology as previously recommended. Follow up ongoing assessment and ongoing development of integrative and comprehensive plan of care for chronic pain. Goals: To establish complementary and integrative plan of care to address chronic pain issues while minimizing pharmaceuticals to maximize patient's function improve quality of life. Education:  Patient again educated on the importance of strict compliance with the opioid care agreement while on opioid therapy. Patient also again educated that they should avoid driving while on chronic opioid therapy. Also advised to avoid alcohol and to avoid benzodiazepines while on opioid therapy. Handouts given regarding opioid safety. Patient Homework:  Continue to independently investigate yoga for persons with chronic pain. Follow-up Disposition:  Return in about 3 months (around 5/6/2019) for 30 min. [address] was used for portions of this report. Unintended errors may occur.

## 2019-02-06 NOTE — PROGRESS NOTES
Nursing Notes    Patient presents to the office today in follow-up. Patient rates her pain at 7/10 on the numerical pain scale. Reviewed medications with counts as follows:    Rx Date filled Qty Dispensed Pill Count Last Dose Short   Hydromorphone 8 mg 1/14/19 90 23 today no   Methadone 5 mg 1/14/19 30 7 today no                                Last opioid agreement 01/08/19  Last urine drug screen 1/4/19    Comments:     POC UDS was not performed in office today    Any new labs or imaging since last appointment? NO    Have you been to an emergency room (ER) or urgent care clinic since your last visit? NO            Have you been hospitalized since your last visit? NO     If yes, where, when, and reason for visit? Have you seen or consulted any other health care providers outside of the [address] [place] [address]  since your last visit? YES     If yes, where, when, and reason for visit? Ms. Citlalli Paulson has a reminder for a \"due or due soon\" health maintenance. I have asked that she contact her primary care provider for follow-up on this health maintenance.

## 2019-03-06 ENCOUNTER — OFFICE VISIT (OUTPATIENT)
Dept: PAIN MANAGEMENT | Age: 44
End: 2019-03-06

## 2019-03-06 VITALS
RESPIRATION RATE: 18 BRPM | TEMPERATURE: 97.9 F | WEIGHT: 133 LBS | DIASTOLIC BLOOD PRESSURE: 85 MMHG | OXYGEN SATURATION: 94 % | HEIGHT: 61 IN | HEART RATE: 107 BPM | SYSTOLIC BLOOD PRESSURE: 119 MMHG | BODY MASS INDEX: 25.11 KG/M2

## 2019-03-06 DIAGNOSIS — R10.2 PELVIC PAIN IN FEMALE: Primary | ICD-10-CM

## 2019-03-06 DIAGNOSIS — M46.1 SACROILIITIS (HCC): ICD-10-CM

## 2019-03-06 DIAGNOSIS — G89.4 CHRONIC PAIN SYNDROME: ICD-10-CM

## 2019-03-06 DIAGNOSIS — R10.84 GENERALIZED ABDOMINAL PAIN: ICD-10-CM

## 2019-03-06 DIAGNOSIS — N30.10 INTERSTITIAL CYSTITIS: ICD-10-CM

## 2019-03-06 RX ORDER — HYDROMORPHONE HYDROCHLORIDE 8 MG/1
8 TABLET ORAL
Qty: 90 TAB | Refills: 0 | Status: SHIPPED | OUTPATIENT
Start: 2019-03-14 | End: 2019-04-04 | Stop reason: SDUPTHER

## 2019-03-06 RX ORDER — CALCIUM/MAGNESIUM/ZINC 333-133 MG
800 TABLET ORAL
COMMUNITY
End: 2022-09-19

## 2019-03-06 NOTE — PROGRESS NOTES
Referred prior to 2011 by primary care provider, Dr. Kasie Muro chronic pelvic pain and interstitial cystitis. Pt was last seen here on February 6, 2019  Calculated MEQ -96  Naloxone rescue -yes  Prophylactic bowel program -yes  Date of last OCA January 2019. Last UDS January 4, 2019  , date checked  today, findings were consistent. GIC-2 and 9  TEE -66%  COMM-16    HPI:  Violeta Fuller is a 45 y.o. female here for f/u visit for ongoing evaluation of chronic pelvic pain with interstitial cystitis. Pt denies interval changes in the character or distribution of pain. Pain is located predominantly in the lower abdomen and pelvic region as a burning aching and stabbing pain. She also has significant aching and stabbing pain at the lumbosacral belt line. She has multiple other areas of pain which are largely controlled. The pain ranges from 4-10/10.    --Pelvic floor PT continues but she has not yet noticed much of a difference in pain. --She cotinues to treat with pain psychology at [facility] and treats with Inés Poon. This is going quite well. --Baclofen is helpful for muscle spasms during the day. --Flexeril is helpful for him muscle spasms especially at night. - Cymbalta continues to be helpful for her pain and depression.  - Hemp oil has been very helpful for her pain. --Off Methadone for almost a month now. She is tolerating discontinuation of methadone but with slightly increased intensity in her pain. She denies any issues with cravings for the medication. --Dilaudid 8 mg up to 3 times daily. Pt reports partial but incomplete analgesia with the current opioid regimen which helps to improve activity tolerance and function. Pt denies aberrant behaviors or any adverse effects.     ROS:Review of systems is negative for   Chills, vomiting, diarrhea, constipation, chest pain, shortness of breath,  acute focal weakness, trouble swallowing, acute changes in vision, acute changes in hearing, falls, , bladder incontinence, bowel incontinence,  suicidal ideation, homicidal ideation, alcohol use. Review of systems positive for fever, nausea, chronic abdominal pain, chronic generalized weakness, intermittent dizziness, depression, anxiety. Vitals:    03/06/19 1540   BP: 119/85   Pulse: (!) 107   Resp: 18   Temp: 97.9 °F (36.6 °C)   TempSrc: Oral   SpO2: 94%   Weight: 60.3 kg (133 lb)   Height: 5' 1\" (1.549 m)   PainSc:   7   PainLoc: Abdomen        PE:  AFVSS with tachycardia, no acute distress, normal body habitus. A&OXs 3.  normocephalic, atraumatic. Conjugate gaze, clear sclerae. Speech is clear and appropriate. Mood is pleasant and appropriate. Patient is cooperative. Near full active range of motion for lumbar flexion with mild pain in the lumbosacral region at end range and no pain with return to neutral.  Active range of motion for lumbar extension is reduced by approximately 25% with reproduction of left-sided lumbosacral pain. Negative seated straight leg raise bilaterally. Negative FABERs on the right and positive on the left. Positive Juan finger sign on the left. Tender to palpation over the left sacroiliac joint. No significant tenderness over the right sacroiliac joint, bilateral piriformis muscles, bilateral greater trochanters, lumbar spine, or bilateral lumbar paraspinals. There is mild tenderness to bilateral quadratus lumborum region. Negative Stinchfield's on the right and the left. left. Gait is within functional limits. Balance is within functional limits.         Primary Care Physician  [address] [place] [address]  [phone]      PHQ -- .  3 most recent PHQ Screens 2/6/2019   PHQ Not Done Active Diagnosis of Depression or Bipolar Disorder   Little interest or pleasure in doing things Not at all   Feeling down, depressed, irritable, or hopeless Several days   Total Score PHQ 2 1 Trouble falling or staying asleep, or sleeping too much -   Feeling tired or having little energy -   Poor appetite, weight loss, or overeating -   Feeling bad about yourself - or that you are a failure or have let yourself or your family down -   Trouble concentrating on things such as school, work, reading, or watching TV -   Moving or speaking so slowly that other people could have noticed; or the opposite being so fidgety that others notice -   Thoughts of being better off dead, or hurting yourself in some way -   PHQ 9 Score -   How difficult have these problems made it for you to do your work, take care of your home and get along with others -         Assessment/Plan:     ICD-10-CM ICD-9-CM    1. Pelvic pain in female R10.2 625.9 HYDROmorphone (DILAUDID) 8 mg tablet   2. Sacroiliitis (HCC) M46.1 720.2    3. Interstitial cystitis N30.10 595.1 HYDROmorphone (DILAUDID) 8 mg tablet   4. Generalized abdominal pain R10.84 789.07 HYDROmorphone (DILAUDID) 8 mg tablet   5. Chronic pain syndrome G89.4 338.4 HYDROmorphone (DILAUDID) 8 mg tablet        --I do not recommend long-term opioid therapy for this person's chronic pain. I believe the risks outweigh any potential benefits. We will progress with a gradual opioid wean. Patient was educated on signs and symptoms of opioid withdrawal and advised to call the clinic should these symptoms arise so that we may provide support as needed. --She is doing fine with the discontinuation of Methadone. --Refill Dilaudid 8 mg as needed up to 3 times daily with 90 tablets provided for 30 days. Maintain this rate until next office visit if safe to do so. --Patient will return for a left left SIJ. --She will continue the great work she is doing with pain Psychology at [facility] with Dr. Melida Power  -She will continue pelvic floor Physical Therapy. --Continue Cymbalta, Flexeril, baclofen. --F/u with Xavi in 3 months.      GOALS:  To establish complementary and integrative plan of care to address chronic pain issues while minimizing pharmaceuticals to maximize patient's function improve quality of life. Education:  Patient again educated on the importance of strict compliance with the opioid care agreement while on opioid therapy. Patient also again educated that they should avoid driving while on chronic opioid therapy. Also advised to avoid alcohol and to avoid benzodiazepines while on opioid therapy. A total of 33 minutes were spent with the patient, of which more than half of the time was spent counseling and/or coordinating care. F/u:. Follow-up Disposition:  Return in about 3 months (around 6/6/2019) for 30 min.          Social History     Socioeconomic History    Marital status:      Spouse name: Not on file    Number of children: Not on file    Years of education: Not on file    Highest education level: Not on file   Social Needs    Financial resource strain: Not on file    Food insecurity - worry: Not on file    Food insecurity - inability: Not on file    Transportation needs - medical: Not on file   [place] needs - non-medical: Not on file   Occupational History    Not on file   Tobacco Use    Smoking status: Former Smoker     Packs/day: 0.50     Years: 10.00     Pack years: 5.00    Smokeless tobacco: Never Used   Substance and Sexual Activity    Alcohol use: Yes     Comment: 1 every 2 months    Drug use: No    Sexual activity: Not on file     Comment: hysterectomy   Other Topics Concern    Not on file   Social History Narrative    Not on file     Family History   Problem Relation Age of Onset    Post-op Nausea/Vomiting Sister     Malignant Hyperthermia Neg Hx     Pseudocholinesterase Deficiency Neg Hx     Delayed Awakening Neg Hx     Emergence Delirium Neg Hx     Post-op Cognitive Dysfunction Neg Hx      Allergies   Allergen Reactions    Augmentin [Amoxicillin-Pot Clavulanate] Anaphylaxis    Amoxicillin Hives and Itching     Itching of throat    Ciprofloxacin Other (comments) and Itching     Redness of arm above IV site    Darvocet A500 [Propoxyphene N-Acetaminophen] Hives    Doxycycline Unknown (comments)    Ketorolac Tromethamine Itching and Hives    Macrobid [Nitrofurantoin Monohyd/M-Cryst] Hives    Morphine Unknown (comments)     Pt states she's had morphine, she's not allergic    Nalbuphine Hives and Other (comments)     Muscle spasms    Phenergan [Promethazine] Other (comments)     Makes me feel like Im on fire from the inside out and causes involuntary muscle spasms.  Vibramycin [Doxycycline Calcium] Hives and Itching     Itching of the throat     Past Medical History:   Diagnosis Date    Anxiety     Arthritis     osteopenia    Chronic kidney disease     kidney stones    Chronic pain     bladder    Depression     Fibromyalgia     Interstitial cystitis     Nausea & vomiting     Other ill-defined conditions(799.89)     chronic interstitual cyctitis    Other ill-defined conditions(799.89)     endometriosis    Psychiatric disorder      Past Surgical History:   Procedure Laterality Date    HX APPENDECTOMY      HX  SECTION      HX HEENT      oral surgery wisdom teeth extractiion    HX HYSTERECTOMY      LAVH    HX PELVIC LAPAROSCOPY      x 5    HX UROLOGICAL      cysto and hydrodistention x 4    HX UROLOGICAL      Interstim implant    HX UROLOGICAL  2013    revision interstim     Current Outpatient Medications on File Prior to Visit   Medication Sig    echinacea 400 mg cap Take 800 mg by mouth two (2) times a day.  OTHER Hemp oil as needed    DULoxetine (CYMBALTA) 60 mg capsule Take 1 Cap by mouth two (2) times a day.  cyclobenzaprine (FLEXERIL) 5 mg tablet Take 1 or 2 tablets as needed nightly for muscle spasm    baclofen (LIORESAL) 10 mg tablet Take 1 or 2 tablets PO as needed up to 3 times daily. Maximum 4 tablets daily.     pseudoephedrine (SUDAFED) 30 mg tablet Take  by mouth every four (4) hours as needed for Congestion.  phenazopyridine HCl (AZO PO) Take 1 Tab by mouth daily.  diphenhydrAMINE (BENADRYL) 25 mg capsule Take 50 mg by mouth two (2) times daily as needed.  methadone (DOLOPHINE) 5 mg tablet 1 TAB DAILY    naloxone (NARCAN) 4 mg/actuation nasal spray Use 1 spray intranasally, then discard. Repeat with new spray every 2 min as needed for opioid overdose symptoms, alternating nostrils.  senna (SENNA) 8.6 mg tablet Take 1 Tab by mouth daily as needed for Constipation.  EPINEPHrine (AUVI-Q) 0.3 mg/0.3 mL (1:1,000) injection 0.3 mL by IntraMUSCular route once as needed for Other (severe allergic reaction ) for 1 dose. No current facility-administered medications on file prior to visit.

## 2019-03-06 NOTE — PROGRESS NOTES
Nursing Notes    Patient presents to the office today in follow-up. Patient rates her pain at 7/10 on the numerical pain scale. Reviewed medications with counts as follows:    Rx Date filled Qty Dispensed Pill Count Last Dose Short   Dilaudid 8 mg 02/12/19 90 28 today no                                       reviewed YES  Any aberrancies noted on  NO  Last opioid agreement 01/04/19  Last urine drug screen 01/04/19    Comments:     POC UDS was not performed in office today. Any new labs or imaging since last appointment? NO    Have you been to an emergency room (ER) or urgent care clinic since your last visit? NO            Have you been hospitalized since your last visit? NO     If yes, where, when, and reason for visit? Have you seen or consulted any other health care providers outside of the [address] [place] [address]  since your last visit? YES     If yes, where, when, and reason for visit? Physical therapy for pelvic   Ms. Carie Tyson has a reminder for a \"due or due soon\" health maintenance. I have asked that she contact her primary care provider for follow-up on this health maintenance.

## 2019-03-06 NOTE — PATIENT INSTRUCTIONS

## 2019-04-04 DIAGNOSIS — N30.10 INTERSTITIAL CYSTITIS: ICD-10-CM

## 2019-04-04 DIAGNOSIS — R10.2 PELVIC PAIN IN FEMALE: ICD-10-CM

## 2019-04-04 DIAGNOSIS — R10.84 GENERALIZED ABDOMINAL PAIN: ICD-10-CM

## 2019-04-04 DIAGNOSIS — G89.4 CHRONIC PAIN SYNDROME: ICD-10-CM

## 2019-04-04 NOTE — TELEPHONE ENCOUNTER
Contacted patient. Patient identified using two patient identifiers (name and )   to schedule Left sacroiliac joint injection as no response was received to prior message. Patient states she has been out of town taking care of her father who is terminally ill. She has been scheduled for 19 and is also requesting a refill on her Dilaudid. Patient states Dilaudid provides 50% relief of pain, allows her \"to perform my ADLs, mostly. Without the medication I wouldn't even be able to get out of bed. \" and she has not experienced any side effects. Last OV   Last OCA 2019  No Recent FYI  Last UDS 19    Patient aware she will be contacted when her prescription is ready for pickup.

## 2019-04-05 RX ORDER — HYDROMORPHONE HYDROCHLORIDE 8 MG/1
8 TABLET ORAL
Qty: 90 TAB | Refills: 0 | Status: SHIPPED | OUTPATIENT
Start: 2019-04-12 | End: 2019-04-29 | Stop reason: SDUPTHER

## 2019-04-05 NOTE — TELEPHONE ENCOUNTER
Aidan Vickers has called requesting a refill of their controlled medication, dilaudid, for the management of her chronic pelvic and bladder pain. Last office visit date: 03/06/19  Last opioid care agreement 01/04/19  Last UDS was done 01/04/19    Date last  was pulled and reviewed : 04/05/19  Last fill date for medication was 03/14/19 for #90    Was the patient compliant when the above report was pulled? yes    Analgesia: pt reports a 50% pain relief with her current opioid medication regimen     Aberrancies: none noted     ADL's: pt reports that she is mostly able to perform her normal daily tasks because she is taking her medication. Adverse Reaction: none reported by the pt at this time. Provider's last note and plan of care reviewed? yes  Request forwarded to provider for review.

## 2019-04-05 NOTE — TELEPHONE ENCOUNTER
03:41 pm I M for patient to call back, phone number provided. This call was to notify the patient that her Rx for Hydrocodone is ready for .

## 2019-04-24 ENCOUNTER — APPOINTMENT (OUTPATIENT)
Dept: GENERAL RADIOLOGY | Age: 44
End: 2019-04-24
Attending: PHYSICAL MEDICINE & REHABILITATION
Payer: MEDICAID

## 2019-04-24 ENCOUNTER — HOSPITAL ENCOUNTER (OUTPATIENT)
Age: 44
Setting detail: OUTPATIENT SURGERY
Discharge: HOME | End: 2019-04-24
Attending: PHYSICAL MEDICINE & REHABILITATION | Admitting: PHYSICAL MEDICINE & REHABILITATION
Payer: MEDICAID

## 2019-04-24 VITALS
OXYGEN SATURATION: 100 % | BODY MASS INDEX: 24.17 KG/M2 | DIASTOLIC BLOOD PRESSURE: 78 MMHG | RESPIRATION RATE: 22 BRPM | SYSTOLIC BLOOD PRESSURE: 128 MMHG | HEIGHT: 61 IN | TEMPERATURE: 99 F | WEIGHT: 128 LBS | HEART RATE: 108 BPM

## 2019-04-24 PROCEDURE — 74011636320 HC RX REV CODE- 636/320: Performed by: PHYSICAL MEDICINE & REHABILITATION

## 2019-04-24 PROCEDURE — 76010000009 HC PAIN MGT 0 TO 30 MIN PROC: Performed by: PHYSICAL MEDICINE & REHABILITATION

## 2019-04-24 PROCEDURE — 74011250636 HC RX REV CODE- 250/636: Performed by: PHYSICAL MEDICINE & REHABILITATION

## 2019-04-24 PROCEDURE — 77030003666 HC NDL SPINAL BD -A: Performed by: PHYSICAL MEDICINE & REHABILITATION

## 2019-04-24 RX ORDER — TRIAMCINOLONE ACETONIDE 40 MG/ML
INJECTION, SUSPENSION INTRA-ARTICULAR; INTRAMUSCULAR AS NEEDED
Status: DISCONTINUED | OUTPATIENT
Start: 2019-04-24 | End: 2019-04-24 | Stop reason: HOSPADM

## 2019-04-24 RX ORDER — LIDOCAINE HYDROCHLORIDE 10 MG/ML
INJECTION, SOLUTION EPIDURAL; INFILTRATION; INTRACAUDAL; PERINEURAL AS NEEDED
Status: DISCONTINUED | OUTPATIENT
Start: 2019-04-24 | End: 2019-04-24 | Stop reason: HOSPADM

## 2019-04-24 NOTE — PROCEDURES
[facility]  Brief Pre-Procedure History & Physical    PATIENT NAME:  Kelley Se OF BIRTH:  1975  DATE OF SERVICE:  2019      CHIEF COMPLAINT:  Pain    HISTORY OF PRESENT ILLNESS:  Katarina Griggs presents today for a previously diagnosed problem contributing to some or all of this patients pain. The location and pattern of the pain has not changed substantially since the last visit in our office. No other significant medical changes have occurred in the last 30 days. PAST MEDICAL HISTORY:  The patient  has a past medical history of Anxiety, Arthritis, Chronic kidney disease, Chronic pain, Depression, Fibromyalgia, Interstitial cystitis, Nausea & vomiting, Other ill-defined conditions(799.89), Other ill-defined conditions(799.89), and Psychiatric disorder. PAST SURGICAL HISTORY:  The patient  has a past surgical history that includes hx appendectomy; hx  section (); hx hysterectomy (); hx pelvic laparoscopy; hx urological; hx urological; hx urological (2013); and hx heent. CURRENT MEDICATIONS:  See Medication Administration Record [facility]) in the patient's electronic record. ALLERGIES:    Allergies   Allergen Reactions    Augmentin [Amoxicillin-Pot Clavulanate] Anaphylaxis    Amoxicillin Hives and Itching     Itching of throat    Ciprofloxacin Other (comments) and Itching     Redness of arm above IV site    Darvocet A500 [Propoxyphene N-Acetaminophen] Hives    Doxycycline Unknown (comments)    Ketorolac Tromethamine Itching and Hives    Macrobid [Nitrofurantoin Monohyd/M-Cryst] Hives    Morphine Unknown (comments)     Pt states she's had morphine, she's not allergic    Nalbuphine Hives and Other (comments)     Muscle spasms    Phenergan [Promethazine] Other (comments)     Makes me feel like Im on fire from the inside out and causes involuntary muscle spasms.      Vibramycin [Doxycycline Calcium] Hives and Itching Itching of the throat       FAMILY HISTORY:  The patient family history includes Post-op Nausea/Vomiting in her sister. SOCIAL HISTORY:  The patient  reports that she has quit smoking. She has a 5.00 pack-year smoking history. She has never used smokeless tobacco. The patient  reports that she drinks alcohol. She  reports that she does not use drugs. REVIEW OF SYSTEMS:  Cooper Crawford denies any fever, chills, unexplained weight loss, use of antibiotics for recent infection or bleeding abnormalities. PHYSICAL EXAM:  VS: There were no vitals taken for this visit. Gen: Well-developed. Body habitus consistent with recorded height and weight and the calculated BMI. No apparent distress. Head: Normocephalic, atraumatic. Skin: No obvious rashes, lesions or infection. Pulm: Respirations are even and unlabored. Psych:    Mood, affect and speech - Appropriate. Positive left Hortensia's. Tender to palpation over the left sacroiliac joint. ASSESSMENT:   1. Stable for left sacroiliac joint injection as discussed. PLAN:  Proceed with scheduled procedure. Ping Santo DO 4/24/2019 Time: 1648 [facility]    SACROILIAC JOINT INJECTION PROCEDURE REPORT      PATIENT:  Mariam Vazquez OF BIRTH:  1975  DATE OF SERVICE:  4/24/2019  [facility]    PRE-PROCEDURE DIAGNOSIS:  Sacroiliitis    POST-PROCEDURE DIAGNOSIS:  Same as Above                PROCEDURE:    1. Left  sacroiliac joint injection (19204)    ANESTHESIA:  See Medication Administration Record    COMPLICATIONS: None. PHYSICIAN:  Ping Santo DO    PRE-PROCEDURE NOTE:  Pre-procedural assessment of the patient was performed including a limited history and physical examination.   The details of the procedure were discussed with the patient, including the risks, benefits and alternative options and an informed consent was obtained. The patients NPO status and availability of a responsible adult to escort the patient following the procedure were confirmed. PROCEDURE NOTE:  The patient was brought to the procedure suite and positioned on the fluoroscopy table in the prone position. Physiologic monitors were applied and supplemental oxygen was administered via nasal cannula. The skin was prepped in the standard surgical fashion and sterile drapes were applied over the procedure site. Please refer to the Flowsheet for documentation of the patients vital signs and the Medication Administration Record for any oral and/or intravenous sedation administered prior to or during the procedure. 1% Lidocaine was utilized for local anesthesia. Under slight contralateral oblique fluoroscopic guidance, a 25-gauge, 3.5-inch short bevel spinal needle was advanced into the SI joint from the posteriomedial approach. After negative aspiration 0.5-1.0 mL of contrast dye solution was injected to ensure intra-articular uptake and nonvascular spread. Following this, 3 mL of a solution comprised of a mixture of triamcinolone (40 mg/mL) and 1% lidocaine was injected after negative aspiration of blood, air, or fluid. The needle was flushed and removed intact. The area was thoroughly cleaned and sterile bandages applied as necessary. The patient tolerated the procedure well and vital signs remained stable throughout the procedure. POST-PROCEDURE COURSE:   The patient was escorted from the procedure suite in satisfactory condition and recovered per facility protocol based on the type of procedure performed and/or the sedation utilized. The patient did not experience any adverse events and remained hemodynamically stable during the post-procedure period. Postprocedure pain relief with provocation was 60%. DISCHARGE NOTE:  Upon discharge, the patient was able to tolerate fluids and was in no acute distress.   The patient was oriented to person, place and time and vital signs were stable. Appropriate post-procedure instructions were provided and explained to the patient in detail and all questions were answered.     Reuben De DO 4/24/2019 1144

## 2019-04-24 NOTE — DISCHARGE INSTRUCTIONS
[address] [place] [address] for Pain Management  Dr. Elieser Ledesma Procedures Instructions    *Resume Diet and Activity as tolerated. Rest for the remainder of the day. *You may fell worse before you feel better as the numbing medications wear off before the steroids take effect if used for your procedures. *Do not use affected extremity until numbness or loss of sensation has completely resolved without assistance. *DO NOT DRIVE, operate machinery/heavey equipment for 24 hours. *DO NOT DRINK ALCOHOL for 24 hours as it may interact with the sedation if you received it and also thins your blood and may cause you to bleed. *WAIT 24 hours before starting back ANY Blood thinning medications:   (Heparin, Coumadin, Warfarin, Lovenox, Plavix, Aggrenox)    *Resume Pre-Procedure Medications as prescribed except Blood Thinners unless directed by your Physician or Cardiologist.     *Avoid Hot tubs and Heating pad for 24 hours to prevent dissipation of medications, you may shower to remove bandages and remaining prep residue on the skin. * If you develop a Headache, drink plenty of fluids including beverages with caffeine (Coffee, Mt. Dew etc.) and rest.  If the headache persists longer than 24 hoursor intensifies - Please call [facility]) [phone]    * If you are DIABETIC, check your blood sugar three times a day for the next three days, the steroids will increase your blood sugar. If your blood sugar is greater than 400 have someone drive you to the nearest [address]. * If you experience any of the following problems, call the [facility] [phone] between 8:00 am - 4:30pm or After Hours [phone].     Shortness of breath    Fever of 101 F or higher    Nausea / Vomiting (not normal to you)    Increasing stiffness in the neck    Weakness or numbness in the arms or legs that is not resolving    Prolonged and increasing pain > than 4 days    ANYTHING OUT of the ORDINARY TO YOU    If YOU are experiencing a severe reaction / complication that you have never had before post procedure, call 911 or go to the nearest emergency room! All patients must have a  for transportation [place] regardless if you do or do not receive sedation. DISCHARGE SUMMARY from Nurse      PATIENT INSTRUCTIONS:    After Oral  or intravenous sedation, for 24 hours or while taking prescription Narcotics:  · Limit your activities  · Do not drive and operate hazardous machinery  · Do not make important personal or business decisions  · Do  not drink alcoholic beverages  · If you have not urinated within 8 hours after discharge, please contact your surgeon on call. Report the following to your surgeon:  · Excessive pain, swelling, redness or odor of or around the surgical area  · Temperature over 101  · Nausea and vomiting lasting longer than 4 hours or if unable to take medications  · Any signs of decreased circulation or nerve impairment to extremity: change in color, persistent  numbness, tingling, coldness or increase pain  · Any questions        What to do at Home:  Recommended activity: Activity as tolerated, NO DRIVING FOR 24 Hours post injection          *  Please give a list of your current medications to your Primary Care Provider. *  Please update this list whenever your medications are discontinued, doses are      changed, or new medications (including over-the-counter products) are added. *  Please carry medication information at all times in case of emergency situations. These are general instructions for a healthy lifestyle:    No smoking/ No tobacco products/ Avoid exposure to second hand smoke    Surgeon General's Warning:  Quitting smoking now greatly reduces serious risk to your health.     Obesity, smoking, and sedentary lifestyle greatly increases your risk for illness    A healthy diet, regular physical exercise & weight monitoring are important for maintaining a healthy lifestyle    You may be retaining fluid if you have a history of heart failure or if you experience any of the following symptoms:  Weight gain of 3 pounds or more overnight or 5 pounds in a week, increased swelling in our hands or feet or shortness of breath while lying flat in bed. Please call your doctor as soon as you notice any of these symptoms; do not wait until your next office visit. Recognize signs and symptoms of STROKE:    F-face looks uneven    A-arms unable to move or move unevenly    S-speech slurred or non-existent    T-time-call 911 as soon as signs and symptoms begin-DO NOT go       Back to bed or wait to see if you get better-TIME IS BRAIN.

## 2019-04-29 ENCOUNTER — OFFICE VISIT (OUTPATIENT)
Dept: PAIN MANAGEMENT | Age: 44
End: 2019-04-29

## 2019-04-29 VITALS
OXYGEN SATURATION: 96 % | HEART RATE: 111 BPM | RESPIRATION RATE: 18 BRPM | WEIGHT: 132 LBS | TEMPERATURE: 99 F | HEIGHT: 61 IN | DIASTOLIC BLOOD PRESSURE: 77 MMHG | SYSTOLIC BLOOD PRESSURE: 121 MMHG | BODY MASS INDEX: 24.92 KG/M2

## 2019-04-29 DIAGNOSIS — M79.10 MYALGIA: ICD-10-CM

## 2019-04-29 DIAGNOSIS — M62.838 MUSCLE SPASM: ICD-10-CM

## 2019-04-29 DIAGNOSIS — G89.4 CHRONIC PAIN SYNDROME: ICD-10-CM

## 2019-04-29 DIAGNOSIS — R10.84 GENERALIZED ABDOMINAL PAIN: ICD-10-CM

## 2019-04-29 DIAGNOSIS — R10.2 PELVIC PAIN IN FEMALE: ICD-10-CM

## 2019-04-29 DIAGNOSIS — Z79.899 ENCOUNTER FOR LONG-TERM (CURRENT) USE OF HIGH-RISK MEDICATION: Primary | ICD-10-CM

## 2019-04-29 DIAGNOSIS — N30.10 INTERSTITIAL CYSTITIS: ICD-10-CM

## 2019-04-29 RX ORDER — ACETAMINOPHEN 325 MG/1
TABLET ORAL
COMMUNITY
End: 2022-09-19

## 2019-04-29 RX ORDER — ATROPA BELLADONNA AND OPIUM 16.2; 6 MG/1; MG/1
1 SUPPOSITORY RECTAL
Qty: 3 SUPPOSITORY | Refills: 0 | Status: SHIPPED | OUTPATIENT
Start: 2019-04-29 | End: 2019-05-02

## 2019-04-29 RX ORDER — BACLOFEN 10 MG/1
TABLET ORAL
Qty: 120 TAB | Refills: 2 | Status: SHIPPED | OUTPATIENT
Start: 2019-04-29 | End: 2019-08-08 | Stop reason: SDUPTHER

## 2019-04-29 RX ORDER — HYDROMORPHONE HYDROCHLORIDE 8 MG/1
8 TABLET ORAL
Qty: 90 TAB | Refills: 0 | Status: SHIPPED | OUTPATIENT
Start: 2019-05-12 | End: 2019-06-05 | Stop reason: SDUPTHER

## 2019-04-29 RX ORDER — DULOXETIN HYDROCHLORIDE 60 MG/1
60 CAPSULE, DELAYED RELEASE ORAL 2 TIMES DAILY
Qty: 60 CAP | Refills: 2 | Status: SHIPPED | OUTPATIENT
Start: 2019-04-29 | End: 2019-08-08 | Stop reason: SDUPTHER

## 2019-04-29 RX ORDER — CYCLOBENZAPRINE HCL 5 MG
TABLET ORAL
Qty: 60 TAB | Refills: 2 | Status: SHIPPED | OUTPATIENT
Start: 2019-04-29 | End: 2019-08-08 | Stop reason: SDUPTHER

## 2019-04-29 RX ORDER — ECHINACEA 380 MG
1 CAPSULE ORAL DAILY
COMMUNITY
End: 2022-09-19

## 2019-04-29 NOTE — PROGRESS NOTES
Referral date before 2011, source PCP Dr Pranay Franklin and for chronic pelvic pain due to interstitial cystitis. HPI:  Rhoda Marcial is a 45 y.o. female here for f/u visit for ongoing evaluation of chronic pelvis pain due to interstitial cystitis. Hx implanted InterStim which required revision in 2013. Pt was last seen here on 3/6/19. Pt denies interval changes on the character or distribution of pain but reports having a flare up starting Friday night. Pain is located like abelt all the way around her trunk. The pain is described as aching to stabbing. Pain at its best is 4/10. Pain at its worse is 10/10. The pain is worsened by pelvic floor PT done 9 years ago. Symptoms are improved by Cymbalta, Baclofen, Flexeril and Dilaudid. Pt reports no relief from Methadone. Pt has tried motrin, tylenol and topical compound cream with no perceived benefit. Lyrica with significant weight gain across her abdomen causing increased pain. Since last visit, she is still in pelvic floor PT but states she has not been in about 1 month. She has not noticed any pain relief but continues to \"push through\". She is requesting script for Belladona-Opium suppositories to be used for acute flare up pain as she had this back in 2016 from Dr Víctor Dowling with relief noted.        Social History     Socioeconomic History    Marital status:      Spouse name: Not on file    Number of children: Not on file    Years of education: Not on file    Highest education level: Not on file   Occupational History    Not on file   Social Needs    Financial resource strain: Not on file    Food insecurity:     Worry: Not on file     Inability: Not on file    Transportation needs:     Medical: Not on file     Non-medical: Not on file   Tobacco Use    Smoking status: Former Smoker     Packs/day: 0.50     Years: 10.00     Pack years: 5.00    Smokeless tobacco: Never Used   Substance and Sexual Activity    Alcohol use: Yes     Comment: 1 every 2 months    Drug use: No    Sexual activity: Not on file     Comment: hysterectomy   Lifestyle    Physical activity:     Days per week: Not on file     Minutes per session: Not on file    Stress: Not on file   Relationships    Social connections:     Talks on phone: Not on file     Gets together: Not on file     Attends Sikh service: Not on file     Active member of club or organization: Not on file     Attends meetings of clubs or organizations: Not on file     Relationship status: Not on file    Intimate partner violence:     Fear of current or ex partner: Not on file     Emotionally abused: Not on file     Physically abused: Not on file     Forced sexual activity: Not on file   Other Topics Concern    Not on file   Social History Narrative    Not on file     Family History   Problem Relation Age of Onset    Post-op Nausea/Vomiting Sister     Malignant Hyperthermia Neg Hx     Pseudocholinesterase Deficiency Neg Hx     Delayed Awakening Neg Hx     Emergence Delirium Neg Hx     Post-op Cognitive Dysfunction Neg Hx      Allergies   Allergen Reactions    Augmentin [Amoxicillin-Pot Clavulanate] Anaphylaxis    Amoxicillin Hives and Itching     Itching of throat    Ciprofloxacin Other (comments) and Itching     Redness of arm above IV site    Darvocet A500 [Propoxyphene N-Acetaminophen] Hives    Doxycycline Unknown (comments)    Ketorolac Tromethamine Itching and Hives    Macrobid [Nitrofurantoin Monohyd/M-Cryst] Hives    Morphine Unknown (comments)     Pt states she's had morphine, she's not allergic    Nalbuphine Hives and Other (comments)     Muscle spasms    Phenergan [Promethazine] Other (comments)     Makes me feel like Im on fire from the inside out and causes involuntary muscle spasms.      Vibramycin [Doxycycline Calcium] Hives and Itching     Itching of the throat     Past Medical History:   Diagnosis Date    Anxiety     Arthritis     osteopenia    Chronic kidney disease     kidney stones    Chronic pain     bladder    Depression     Fibromyalgia     Interstitial cystitis     Nausea & vomiting     Other ill-defined conditions(799.89)     chronic interstitual cyctitis    Other ill-defined conditions(799.89)     endometriosis    Psychiatric disorder      Past Surgical History:   Procedure Laterality Date    HX APPENDECTOMY      HX  SECTION      HX HEENT      oral surgery wisdom teeth extractiion    HX HYSTERECTOMY      LAVH    HX PELVIC LAPAROSCOPY      x 5    HX UROLOGICAL      cysto and hydrodistention x 4    HX UROLOGICAL      Interstim implant    HX UROLOGICAL  2013    revision interstim     Current Outpatient Medications on File Prior to Visit   Medication Sig    epigallocatechin gallate (GREEN TEA EXTRACT) by Does Not Apply route.  APPLE CIDER VINEGAR PO Take  by mouth.  aloe vera 25 mg cap Take  by mouth.  multivitamin,stress formula (STRESS PO) Take  by mouth.  acetaminophen (TYLENOL) 325 mg tablet Take  by mouth every four (4) hours as needed for Pain.  therapeutic multivitamin-minerals (THERAGRAN-M) tablet Take 1 Tab by mouth daily.  echinacea 400 mg cap Take 800 mg by mouth two (2) times a day.  OTHER Hemp oil as needed    naloxone (NARCAN) 4 mg/actuation nasal spray Use 1 spray intranasally, then discard. Repeat with new spray every 2 min as needed for opioid overdose symptoms, alternating nostrils.  pseudoephedrine (SUDAFED) 30 mg tablet Take  by mouth every four (4) hours as needed for Congestion.  phenazopyridine HCl (AZO PO) Take 1 Tab by mouth daily.  senna (SENNA) 8.6 mg tablet Take 1 Tab by mouth daily as needed for Constipation.  diphenhydrAMINE (BENADRYL) 25 mg capsule Take 50 mg by mouth two (2) times daily as needed.  EPINEPHrine (AUVI-Q) 0.3 mg/0.3 mL (1:1,000) injection 0.3 mL by IntraMUSCular route once as needed for Other (severe allergic reaction ) for 1 dose.      No current facility-administered medications on file prior to visit. ROS:  Reports fever (due to \"possible bladder infection\"), abdominal pain, weakness, depression and anxiety. Reports shortness of breath and dizziness (have resolved, yesterday due to flare up)  Denies chills, nausea, vomiting, diarrhea, constipation, chest pain, trouble swallowing, changes in vision, changes in hearing, falls, bladder incontinence, bowel incontinence, suicidal ideations, homicidal ideations or alcohol use. Mliss Lawler Opioid specific risk: panic disorder, anxiety, depression, constipation     Opioid specific history: patient has been on continuous opioid therapy for approximately 6 years. She has not had symptoms of withdrawal in the past. She expresses a fear of developing withdrawal symptoms. She states that she would like to be opioid free. Vitals:    04/29/19 0837   BP: 121/77   Pulse: (!) 111   Resp: 18   Temp: 99 °F (37.2 °C)   SpO2: 96%   Weight: 59.9 kg (132 lb)   Height: 5' 1\" (1.549 m)   PainSc:   7   PainLoc: Groin          Physical exam:  AFVSS with tachycardia, no acute distress, normal body habitus. A&OXs 3. Normocephalic, atraumatic. Conjugate gaze, clear sclerae. Speech is clear and appropriate. Mood is appropriate and patient is cooperative. Gait and balance are within functional limits. Non-labored breathing.        Calculated MEQ - 96  Naloxone rescue - yes  Prophylactic bowel program - yes  Date of last OCA 1/8/19  Last UDS 1/4/19, consistent    date checked today, findings consistent    Primary Care Physician  [address], [address] [facility]  [phone]    Today   Last Visit Prior Visit  PGIC - 2 & 8  2 & 8  2 & 4  TEE - 64%  64%  62%  COMM - 16  14  17    PHQ -- .  3 most recent PHQ Screens 4/29/2019   PHQ Not Done -   Little interest or pleasure in doing things More than half the days   Feeling down, depressed, irritable, or hopeless More than half the days   Total Score PHQ 2 4   Trouble falling or staying asleep, or sleeping too much Nearly every day   Feeling tired or having little energy More than half the days   Poor appetite, weight loss, or overeating Nearly every day   Feeling bad about yourself - or that you are a failure or have let yourself or your family down Several days   Trouble concentrating on things such as school, work, reading, or watching TV Several days   Moving or speaking so slowly that other people could have noticed; or the opposite being so fidgety that others notice Not at all   Thoughts of being better off dead, or hurting yourself in some way Not at all   PHQ 9 Score 14   How difficult have these problems made it for you to do your work, take care of your home and get along with others Extremely difficult       DSM V-OUD Screen--mild to moderate      Assessment/Plan:     ICD-10-CM ICD-9-CM    1. Encounter for long-term (current) use of high-risk medication Z79.899 V58.69    2. Myalgia M79.10 729.1 DULoxetine (CYMBALTA) 60 mg capsule      cyclobenzaprine (FLEXERIL) 5 mg tablet      opium-belladonna (B&O 16-A SUPPRETTE) 16.2-60 mg suppository   3. Chronic pain syndrome G89.4 338.4 baclofen (LIORESAL) 10 mg tablet      opium-belladonna (B&O 16-A SUPPRETTE) 16.2-60 mg suppository      HYDROmorphone (DILAUDID) 8 mg tablet   4. Muscle spasm M62.838 728.85 baclofen (LIORESAL) 10 mg tablet      cyclobenzaprine (FLEXERIL) 5 mg tablet      opium-belladonna (B&O 16-A SUPPRETTE) 16.2-60 mg suppository   5. Interstitial cystitis N30.10 595.1 baclofen (LIORESAL) 10 mg tablet      opium-belladonna (B&O 16-A SUPPRETTE) 16.2-60 mg suppository      HYDROmorphone (DILAUDID) 8 mg tablet   6. Generalized abdominal pain R10.84 789.07 HYDROmorphone (DILAUDID) 8 mg tablet   7. Pelvic pain in female R10.2 625.9 HYDROmorphone (DILAUDID) 8 mg tablet        Do not recommend long term opioid therapy for this patient at this time for their chronic pain; risks outweigh the potential benefits.  Pt currently taking dilaudid 8mg up to 3 times a day as needed with a total of 90 tabs to be used over 30 days. Their MME is 96. She has been successfully weaned off Methadone with last fill date of 1/14/19. Today, we will continue her current dosing and continue the weaning of patients opioid medication with a goal of being opioid free, pending safety and compliance at next office visit. Pt instructed to call if they experience any signs of withdrawal (diarrhea, nausea, vomiting, sweating or chills, agitation, itching). At next office visit, the plan is to provide patient with dilaudid 8mg up to 3 times a day as needed with a total of 85 tabs to be budgeted over 30 days. If patient has difficulty with the wean or difficulty with cravings we will consider referral to mental health for ongoing assessment and treatment for opioid use disorder. Flexeril, Baclofen and Cymbalta refilled today. Belladonna-opium ordered today for acute flare only. Will consider retrial of Elavil at next visit; pt states she does not recall trying this medication in the past.  Close follow up urology and neurology. Continue to discuss pain pump and SCS with patient. Lidocaine and or ketamine infusions remain an option for trial.  Continue pelvic floor PT and pain psychology as previously recommended. Follow up ongoing assessment and ongoing development of integrative and comprehensive plan of care for chronic pain. Goals: To establish complementary and integrative plan of care to address chronic pain issues while minimizing pharmaceuticals to maximize patient's function improve quality of life. Education:  Patient again educated on the importance of strict compliance with the opioid care agreement while on opioid therapy. Patient also again educated that they should avoid driving while on chronic opioid therapy. Also advised to avoid alcohol and to avoid benzodiazepines while on opioid therapy.     Handouts given regarding opioid safety. Patient Homework:  Continue to independently investigate yoga for persons with chronic pain. Follow-up and Dispositions    · Return in about 7 weeks (around 6/17/2019) for 30 min. [address] was used for portions of this report. Unintended errors may occur.

## 2019-04-29 NOTE — PROGRESS NOTES
Nursing Notes    Patient presents to the office today in follow-up. Patient rates her pain at 7/10 on the numerical pain scale. Reviewed medications with counts as follows:    Rx Date filled Qty Dispensed Pill Count Last Dose Short   Dilaudid 8 mg 04/12/19 90 44 This am  no        reviewed YES  Any aberrancies noted on  NO  Last opioid agreement 01/04/19  Last urine drug screen 01/04/19    Comments: Patient is here today for a follow up appt today for her chronic pelvic pain. She states her pain level today is a 7  Patient states that she has seen her neurol MD and an EMG was done. Patient needs a refill on Flexeril, Baclofen and Cymbalta  Patient states she has been having a fever     3 most recent PHQ Screens 4/29/2019   PHQ Not Done -   Little interest or pleasure in doing things More than half the days   Feeling down, depressed, irritable, or hopeless More than half the days   Total Score PHQ 2 4   Trouble falling or staying asleep, or sleeping too much Nearly every day   Feeling tired or having little energy More than half the days   Poor appetite, weight loss, or overeating Nearly every day   Feeling bad about yourself - or that you are a failure or have let yourself or your family down Several days   Trouble concentrating on things such as school, work, reading, or watching TV Several days   Moving or speaking so slowly that other people could have noticed; or the opposite being so fidgety that others notice Not at all   Thoughts of being better off dead, or hurting yourself in some way Not at all   PHQ 9 Score 14   How difficult have these problems made it for you to do your work, take care of your home and get along with others Extremely difficult          POC UDS was not performed in office today    Any new labs or imaging since last appointment? YES    Have you been to an emergency room (ER) or urgent care clinic since your last visit?   NO            Have you been hospitalized since your last visit? NO     If yes, where, when, and reason for visit? Have you seen or consulted any other health care providers outside of the [address] [place] [address]  since your last visit? YES   Neurology and PT   If yes, where, when, and reason for visit? Ms. Juju Watt has a reminder for a \"due or due soon\" health maintenance. I have asked that she contact her primary care provider for follow-up on this health maintenance.

## 2019-04-29 NOTE — PATIENT INSTRUCTIONS

## 2019-06-05 DIAGNOSIS — N30.10 INTERSTITIAL CYSTITIS: ICD-10-CM

## 2019-06-05 DIAGNOSIS — G89.4 CHRONIC PAIN SYNDROME: ICD-10-CM

## 2019-06-05 DIAGNOSIS — R10.2 PELVIC PAIN IN FEMALE: ICD-10-CM

## 2019-06-05 DIAGNOSIS — R10.84 GENERALIZED ABDOMINAL PAIN: ICD-10-CM

## 2019-06-05 NOTE — TELEPHONE ENCOUNTER
Brianna Awad has called requesting a refill of their controlled medication, Dilaudid, for the management of chronic pain. Last office visit date: 4/29/19  Last opioid care agreement 1/19  Last UDS was done 1/19    Date last  was pulled and reviewed : 6/5/19  Last fill date for medication was 5/13/19    Was the patient compliant when the above report was pulled? yes    Analgesia: Patient reports 50% pain relief on current regimen. Aberrancies: No aberrancies noted in the last 30 days. ADL's: Patient states they are able to perform ADL's on current regimen. Adverse Reaction: Patient reports no adverse reactions at this time. Provider's last note and plan of care reviewed? yes  Request forwarded to provider for review.

## 2019-06-06 RX ORDER — HYDROMORPHONE HYDROCHLORIDE 8 MG/1
8 TABLET ORAL
Qty: 90 TAB | Refills: 0 | Status: SHIPPED | OUTPATIENT
Start: 2019-06-12 | End: 2019-06-17 | Stop reason: SDUPTHER

## 2019-06-06 NOTE — TELEPHONE ENCOUNTER
Spoke with patient after getting two points of identity informing patient that she has a prescription ready to pickup and to arrive by 3:45pm and bring valid picture ID. Patient stated she will come next week.

## 2019-06-17 ENCOUNTER — OFFICE VISIT (OUTPATIENT)
Dept: PAIN MANAGEMENT | Age: 44
End: 2019-06-17

## 2019-06-17 VITALS
OXYGEN SATURATION: 97 % | HEART RATE: 123 BPM | HEIGHT: 61 IN | BODY MASS INDEX: 23.79 KG/M2 | WEIGHT: 126 LBS | TEMPERATURE: 100.4 F | SYSTOLIC BLOOD PRESSURE: 120 MMHG | DIASTOLIC BLOOD PRESSURE: 84 MMHG | RESPIRATION RATE: 18 BRPM

## 2019-06-17 DIAGNOSIS — R10.84 GENERALIZED ABDOMINAL PAIN: ICD-10-CM

## 2019-06-17 DIAGNOSIS — N30.10 INTERSTITIAL CYSTITIS: ICD-10-CM

## 2019-06-17 DIAGNOSIS — R10.2 PELVIC PAIN IN FEMALE: ICD-10-CM

## 2019-06-17 DIAGNOSIS — G89.4 CHRONIC PAIN SYNDROME: Primary | ICD-10-CM

## 2019-06-17 DIAGNOSIS — Z79.899 ENCOUNTER FOR LONG-TERM (CURRENT) USE OF HIGH-RISK MEDICATION: ICD-10-CM

## 2019-06-17 LAB
ALCOHOL UR POC: NORMAL
AMPHETAMINES UR POC: NEGATIVE
BARBITURATES UR POC: NORMAL
BENZODIAZEPINES UR POC: NEGATIVE
BUPRENORPHINE UR POC: NEGATIVE
CANNABINOIDS UR POC: NEGATIVE
CARISOPRODOL UR POC: NORMAL
COCAINE UR POC: NEGATIVE
FENTANYL UR POC: NORMAL
MDMA/ECSTASY UR POC: NORMAL
METHADONE UR POC: NEGATIVE
METHAMPHETAMINE UR POC: NORMAL
METHYLPHENIDATE UR POC: NORMAL
OPIATES UR POC: NORMAL
OXYCODONE UR POC: NEGATIVE
PHENCYCLIDINE UR POC: NORMAL
PROPOXYPHENE UR POC: NORMAL
TRAMADOL UR POC: NORMAL
TRICYCLICS UR POC: NORMAL

## 2019-06-17 RX ORDER — HYDROMORPHONE HYDROCHLORIDE 8 MG/1
8 TABLET ORAL
Qty: 90 TAB | Refills: 0 | Status: SHIPPED | OUTPATIENT
Start: 2019-07-12 | End: 2019-08-08 | Stop reason: SDUPTHER

## 2019-06-17 RX ORDER — AMITRIPTYLINE HYDROCHLORIDE 25 MG/1
25 TABLET, FILM COATED ORAL
Qty: 30 TAB | Refills: 0 | Status: SHIPPED | OUTPATIENT
Start: 2019-06-17 | End: 2019-07-10 | Stop reason: SDUPTHER

## 2019-06-17 RX ORDER — HYDROMORPHONE HYDROCHLORIDE 8 MG/1
8 TABLET ORAL
Qty: 85 TAB | Refills: 0 | Status: SHIPPED | OUTPATIENT
Start: 2019-07-12 | End: 2019-06-17

## 2019-06-17 NOTE — PATIENT INSTRUCTIONS

## 2019-06-17 NOTE — PROGRESS NOTES
Nursing Notes    Patient presents to the office today in follow-up. Patient rates her pain at 7/10 on the numerical pain scale. Reviewed medications with counts as follows:    Rx Date filled Qty Dispensed Pill Count Last Dose Short   Dilaudid 8 mg 06/12/19 90 77 This am  no        reviewed YES  Any aberrancies noted on  NO  Last opioid agreement 01/04/19  Last urine drug screen 01/04/19    Comments:  Patient is here today for a follow up quoc today for her follow up quoc today for her chronic pelvic pain. She states her pain level today is a 7  Patient states she went to Urgent Care for a sinus infection. Patient has a temp today she states her son was with kids at [employer] and came home sick. 3 most recent PHQ Screens 6/17/2019   PHQ Not Done -   Little interest or pleasure in doing things More than half the days   Feeling down, depressed, irritable, or hopeless More than half the days   Total Score PHQ 2 4   Trouble falling or staying asleep, or sleeping too much Nearly every day   Feeling tired or having little energy Nearly every day   Poor appetite, weight loss, or overeating Nearly every day   Feeling bad about yourself - or that you are a failure or have let yourself or your family down Several days   Trouble concentrating on things such as school, work, reading, or watching TV Several days   Moving or speaking so slowly that other people could have noticed; or the opposite being so fidgety that others notice Not at all   Thoughts of being better off dead, or hurting yourself in some way Not at all   PHQ 9 Score 15   How difficult have these problems made it for you to do your work, take care of your home and get along with others Extremely difficult         POC UDS was performed in office today per verbal order per KS    Any new labs or imaging since last appointment? YES per verbal order per [place]    Have you been to an emergency room (ER) or urgent care clinic since your last visit?   YES Have you been hospitalized since your last visit? NO     If yes, where, when, and reason for visit? Have you seen or consulted any other health care providers outside of the [address] [place] [address]  since your last visit? YES     If yes, where, when, and reason for visit? Ms. Nathan Costa has a reminder for a \"due or due soon\" health maintenance. I have asked that she contact her primary care provider for follow-up on this health maintenance.

## 2019-06-18 NOTE — PROGRESS NOTES
Referral date before 2011, source PCP Dr Alejandro Whitt and for chronic pelvic pain due to interstitial cystitis. HPI:  Carlton Ballard is a 45 y.o. female here for f/u visit for ongoing evaluation of chronic pelvis pain due to interstitial cystitis. Hx implanted InterStim which required revision in 2013. Pt was last seen here on 4/29/19. Pt denies interval changes on the character or distribution of pain but reports increased intensity due to opiate weaning. Pain is located like a belt all the way around her trunk. The pain is described as aching to stabbing. Pain at its best is 4/10. Pain at its worse is 10/10. The pain is worsened by pelvic floor PT done 9 years ago. Symptoms are improved by Cymbalta, Baclofen, Flexeril and Dilaudid. Pt reports no relief from Methadone. Pt has tried motrin, tylenol and topical compound cream with no perceived benefit. Lyrica with significant weight gain across her abdomen causing increased pain. Since last visit, she has been using CBD oil with is helping with her fibromyalgia pain. She was unable to get Belladonna opium filled as her pharmacy did not have and could not get it. She is in pelvic floor PT but states it is not helping. She continues to see pain psychologist and states she is \"working with that\".       Social History     Socioeconomic History    Marital status:      Spouse name: Not on file    Number of children: Not on file    Years of education: Not on file    Highest education level: Not on file   Occupational History    Not on file   Social Needs    Financial resource strain: Not on file    Food insecurity:     Worry: Not on file     Inability: Not on file    Transportation needs:     Medical: Not on file     Non-medical: Not on file   Tobacco Use    Smoking status: Former Smoker     Packs/day: 0.50     Years: 10.00     Pack years: 5.00    Smokeless tobacco: Never Used   Substance and Sexual Activity    Alcohol use: Yes     Comment: 1 every 2 months    Drug use: No    Sexual activity: Not on file     Comment: hysterectomy   Lifestyle    Physical activity:     Days per week: Not on file     Minutes per session: Not on file    Stress: Not on file   Relationships    Social connections:     Talks on phone: Not on file     Gets together: Not on file     Attends [employer] service: Not on file     Active member of club or organization: Not on file     Attends meetings of clubs or organizations: Not on file     Relationship status: Not on file    Intimate partner violence:     Fear of current or ex partner: Not on file     Emotionally abused: Not on file     Physically abused: Not on file     Forced sexual activity: Not on file   Other Topics Concern    Not on file   Social History Narrative    Not on file     Family History   Problem Relation Age of Onset    Post-op Nausea/Vomiting Sister     Malignant Hyperthermia Neg Hx     Pseudocholinesterase Deficiency Neg Hx     Delayed Awakening Neg Hx     Emergence Delirium Neg Hx     Post-op Cognitive Dysfunction Neg Hx      Allergies   Allergen Reactions    Augmentin [Amoxicillin-Pot Clavulanate] Anaphylaxis    Amoxicillin Hives and Itching     Itching of throat    Ciprofloxacin Other (comments) and Itching     Redness of arm above IV site    Darvocet A500 [Propoxyphene N-Acetaminophen] Hives    Doxycycline Unknown (comments)    Ketorolac Tromethamine Itching and Hives    Macrobid [Nitrofurantoin Monohyd/M-Cryst] Hives    Morphine Unknown (comments)     Pt states she's had morphine, she's not allergic    Nalbuphine Hives and Other (comments)     Muscle spasms    Phenergan [Promethazine] Other (comments)     Makes me feel like Im on fire from the inside out and causes involuntary muscle spasms.      Vibramycin [Doxycycline Calcium] Hives and Itching     Itching of the throat     Past Medical History:   Diagnosis Date    Anxiety     Arthritis     osteopenia    Chronic kidney disease     kidney stones    Chronic pain     bladder    Depression     Fibromyalgia     Interstitial cystitis     Nausea & vomiting     Other ill-defined conditions(799.89)     chronic interstitual cyctitis    Other ill-defined conditions(799.89)     endometriosis    Psychiatric disorder      Past Surgical History:   Procedure Laterality Date    HX APPENDECTOMY      HX  SECTION      HX HEENT      oral surgery wisdom teeth extractiion    HX HYSTERECTOMY      LAVH    HX PELVIC LAPAROSCOPY      x 5    HX UROLOGICAL      cysto and hydrodistention x 4    HX UROLOGICAL      Interstim implant    HX UROLOGICAL  2013    revision interstim     Current Outpatient Medications on File Prior to Visit   Medication Sig    epigallocatechin gallate (GREEN TEA EXTRACT) by Does Not Apply route.  APPLE CIDER VINEGAR PO Take  by mouth.  aloe vera 25 mg cap Take  by mouth.  multivitamin,stress formula (STRESS PO) Take  by mouth.  acetaminophen (TYLENOL) 325 mg tablet Take  by mouth every four (4) hours as needed for Pain.  therapeutic multivitamin-minerals (THERAGRAN-M) tablet Take 1 Tab by mouth daily.  DULoxetine (CYMBALTA) 60 mg capsule Take 1 Cap by mouth two (2) times a day.  cyclobenzaprine (FLEXERIL) 5 mg tablet Take 1 or 2 tablets as needed nightly for muscle spasm    echinacea 400 mg cap Take 800 mg by mouth two (2) times a day.  OTHER Hemp oil as needed    naloxone (NARCAN) 4 mg/actuation nasal spray Use 1 spray intranasally, then discard. Repeat with new spray every 2 min as needed for opioid overdose symptoms, alternating nostrils.  pseudoephedrine (SUDAFED) 30 mg tablet Take  by mouth every four (4) hours as needed for Congestion.  phenazopyridine HCl (AZO PO) Take 1 Tab by mouth daily.  senna (SENNA) 8.6 mg tablet Take 1 Tab by mouth daily as needed for Constipation.  diphenhydrAMINE (BENADRYL) 25 mg capsule Take 50 mg by mouth two (2) times daily as needed.     EPINEPHrine (AUVI-Q) 0.3 mg/0.3 mL (1:1,000) injection 0.3 mL by IntraMUSCular route once as needed for Other (severe allergic reaction ) for 1 dose.  baclofen (LIORESAL) 10 mg tablet Take 1 or 2 tablets PO as needed up to 3 times daily. Maximum 4 tablets daily. No current facility-administered medications on file prior to visit. ROS:  Reports fever (100.4 due to congestion, he son is currently sick), nausea, shortness of breath, abdominal pain, weakness, dizziness, depression and anxiety  Denies chills, vomiting, diarrhea, constipation, chest pain, trouble swallowing, changes in vision, changes in hearing, falls, bladder incontinence, bowel incontinence, suicidal ideations, homicidal ideations or alcohol use. Berto Singh Opioid specific risk: panic disorder, anxiety, depression, constipation     Opioid specific history: patient has been on continuous opioid therapy for approximately 6 years. She has not had symptoms of withdrawal in the past. She expresses a fear of developing withdrawal symptoms. She states that she would like to be opioid free. Vitals:    06/17/19 0934   BP: 120/84   Pulse: (!) 123   Resp: 18   Temp: 100.4 °F (38 °C)   SpO2: 97%   Weight: 57.2 kg (126 lb)   Height: 5' 1\" (1.549 m)   PainSc:   7   PainLoc: Groin          Physical exam:  AFVSS with tachycardia, no acute distress, normal body habitus. A&OXs 3. Normocephalic, atraumatic. Conjugate gaze, clear sclerae. Speech is clear and appropriate. Mood is appropriate and patient is cooperative. Gait and balance are within functional limits. Non-labored breathing.        Calculated MEQ - 96  Naloxone rescue - yes  Prophylactic bowel program - yes  Date of last OCA 1/8/19  Last UDS today, consistent POC, pending confirmatory testing  Prior UDS 1/4/19, consistent    date checked today, findings consistent    Primary Care Physician  Aristides Danielle, [address] [place] “” [address]  [phone]    Today   Last Visit Prior Visit(s)  PGIC - 2 & 8/9  2 & 8  2 & 8  2 & 4  TEE - 72%  64%  64%  62%  COMM - 17  16  14  17    PHQ -- .  3 most recent PHQ Screens 6/17/2019   PHQ Not Done -   Little interest or pleasure in doing things More than half the days   Feeling down, depressed, irritable, or hopeless More than half the days   Total Score PHQ 2 4   Trouble falling or staying asleep, or sleeping too much Nearly every day   Feeling tired or having little energy Nearly every day   Poor appetite, weight loss, or overeating Nearly every day   Feeling bad about yourself - or that you are a failure or have let yourself or your family down Several days   Trouble concentrating on things such as school, work, reading, or watching TV Several days   Moving or speaking so slowly that other people could have noticed; or the opposite being so fidgety that others notice Not at all   Thoughts of being better off dead, or hurting yourself in some way Not at all   PHQ 9 Score 15   How difficult have these problems made it for you to do your work, take care of your home and get along with others Extremely difficult       DSM V-OUD Screen--mild to moderate      Assessment/Plan:     ICD-10-CM ICD-9-CM    1. Chronic pain syndrome G89.4 338.4 amitriptyline (ELAVIL) 25 mg tablet      HYDROmorphone (DILAUDID) 8 mg tablet      DISCONTINUED: HYDROmorphone (DILAUDID) 8 mg tablet   2. Interstitial cystitis N30.10 595.1 amitriptyline (ELAVIL) 25 mg tablet      HYDROmorphone (DILAUDID) 8 mg tablet      DISCONTINUED: HYDROmorphone (DILAUDID) 8 mg tablet   3. Generalized abdominal pain R10.84 789.07 amitriptyline (ELAVIL) 25 mg tablet      HYDROmorphone (DILAUDID) 8 mg tablet      DISCONTINUED: HYDROmorphone (DILAUDID) 8 mg tablet   4. Pelvic pain in female R10.2 625.9 amitriptyline (ELAVIL) 25 mg tablet      HYDROmorphone (DILAUDID) 8 mg tablet      DISCONTINUED: HYDROmorphone (DILAUDID) 8 mg tablet   5.  Encounter for long-term (current) use of high-risk medication Z79.899 V58.69 DRUG SCREEN      AMB POC DRUG SCREEN ()        ---I do not recommend long term opioid therapy for this patient at this time for their chronic pain; risks outweigh the potential benefits. Pt currently taking dilaudid 8mg up to 3 times a day as needed with a total of 90 tabs to be used over 30 days. Their MME is 96. She has been successfully weaned off Methadone with last fill date of 1/14/19. --Today, we will pause the weaning of patients opioid medication with a goal of being opioid free, pending safety and compliance at next office visit. Pt instructed to call if they experience any signs of withdrawal (diarrhea, nausea, vomiting, sweating or chills, agitation, itching). --At next office visit, the plan is to provide patient with dilaudid 8mg up to 3 times a day as needed with a total of 85 tabs to be budgeted over 30 days. If patient has difficulty with the wean or difficulty with cravings we will consider referral to mental health for ongoing assessment and treatment for opioid use disorder. --Continue Flexeril, Baclofen and Cymbalta as previously recommended. --Will start trial of Elavil today. Discuss possible side effect of serotonin syndrome with patient; handout also given to patient. --Belladonna-opium ordered last visit for acute flare only; she has not been able to find a pharmacy that has it or can order it. --Close follow up urology and neurology. --Continue to discuss pain pump and SCS with patient. --Lidocaine and or ketamine infusions remain an option for trial.  --Continue pelvic floor PT and pain psychology as previously recommended. --Follow up ongoing assessment and ongoing development of integrative and comprehensive plan of care for chronic pain. Goals:   To establish complementary and integrative plan of care to address chronic pain issues while minimizing pharmaceuticals to maximize patient's function improve quality of life.    Education:  Patient again educated on the importance of strict compliance with the opioid care agreement while on opioid therapy. Patient also again educated that they should avoid driving while on chronic opioid therapy. Also advised to avoid alcohol and to avoid benzodiazepines while on opioid therapy. Handouts given regarding opioid safety. Patient Homework:  Continue to independently investigate yoga for persons with chronic pain. Follow-up and Dispositions    · Return in 3 weeks (on 7/10/2019) for 30 min at 10:30am.            [address] was used for portions of this report. Unintended errors may occur.

## 2019-07-10 ENCOUNTER — OFFICE VISIT (OUTPATIENT)
Dept: PAIN MANAGEMENT | Age: 44
End: 2019-07-10

## 2019-07-10 VITALS
SYSTOLIC BLOOD PRESSURE: 111 MMHG | BODY MASS INDEX: 23.79 KG/M2 | RESPIRATION RATE: 18 BRPM | DIASTOLIC BLOOD PRESSURE: 75 MMHG | HEART RATE: 129 BPM | OXYGEN SATURATION: 98 % | TEMPERATURE: 99.5 F | WEIGHT: 126 LBS | HEIGHT: 61 IN

## 2019-07-10 DIAGNOSIS — N30.10 INTERSTITIAL CYSTITIS: ICD-10-CM

## 2019-07-10 DIAGNOSIS — M79.10 MYALGIA: ICD-10-CM

## 2019-07-10 DIAGNOSIS — R10.84 GENERALIZED ABDOMINAL PAIN: ICD-10-CM

## 2019-07-10 DIAGNOSIS — G89.4 CHRONIC PAIN SYNDROME: ICD-10-CM

## 2019-07-10 DIAGNOSIS — R10.2 PELVIC PAIN IN FEMALE: Primary | ICD-10-CM

## 2019-07-10 DIAGNOSIS — Z79.899 ENCOUNTER FOR LONG-TERM (CURRENT) USE OF HIGH-RISK MEDICATION: ICD-10-CM

## 2019-07-10 DIAGNOSIS — M62.838 MUSCLE SPASM: ICD-10-CM

## 2019-07-10 RX ORDER — HYDROMORPHONE HYDROCHLORIDE 8 MG/1
8 TABLET ORAL
Qty: 90 TAB | Refills: 0 | Status: CANCELLED | OUTPATIENT
Start: 2019-07-12 | End: 2019-08-11

## 2019-07-10 RX ORDER — CYCLOBENZAPRINE HCL 5 MG
TABLET ORAL
Qty: 60 TAB | Refills: 2 | Status: CANCELLED | OUTPATIENT
Start: 2019-07-10

## 2019-07-10 RX ORDER — DULOXETIN HYDROCHLORIDE 60 MG/1
60 CAPSULE, DELAYED RELEASE ORAL 2 TIMES DAILY
Qty: 60 CAP | Refills: 2 | Status: CANCELLED | OUTPATIENT
Start: 2019-07-10

## 2019-07-10 RX ORDER — AMITRIPTYLINE HYDROCHLORIDE 25 MG/1
25 TABLET, FILM COATED ORAL
Qty: 30 TAB | Refills: 2 | Status: SHIPPED | OUTPATIENT
Start: 2019-07-10 | End: 2019-08-09

## 2019-07-10 NOTE — PATIENT INSTRUCTIONS

## 2019-07-10 NOTE — PROGRESS NOTES
Nursing Notes    Patient presents to the office today in follow-up. Patient rates her pain at 8/10 on the numerical pain scale. Reviewed medications with counts as follows:    Rx Date filled Qty Dispensed Pill Count Last Dose Short   Dilaudid 8 mg tab 6/12/19 90 8 today no                                       reviewed YES  Any aberrancies noted on  NO  Last opioid agreement 6/17/19  Last urine drug screen 1/4/19    Comments:     POC UDS was not performed in office today    Any new labs or imaging since last appointment? NO    Have you been to an emergency room (ER) or urgent care clinic since your last visit? NO            Have you been hospitalized since your last visit? NO     If yes, where, when, and reason for visit? Have you seen or consulted any other health care providers outside of the [address] [place] [address]  since your last visit? NO     If yes, where, when, and reason for visit? Ms. Sebastian Sky has a reminder for a \"due or due soon\" health maintenance. I have asked that she contact her primary care provider for follow-up on this health maintenance.     3 most recent PHQ Screens 7/10/2019   PHQ Not Done -   Little interest or pleasure in doing things Nearly every day   Feeling down, depressed, irritable, or hopeless Nearly every day   Total Score PHQ 2 6   Trouble falling or staying asleep, or sleeping too much Nearly every day   Feeling tired or having little energy Nearly every day   Poor appetite, weight loss, or overeating Not at all   Feeling bad about yourself - or that you are a failure or have let yourself or your family down Several days   Trouble concentrating on things such as school, work, reading, or watching TV Not at all   Moving or speaking so slowly that other people could have noticed; or the opposite being so fidgety that others notice Not at all   Thoughts of being better off dead, or hurting yourself in some way Not at all   PHQ 9 Score 13   How difficult have these problems made it for you to do your work, take care of your home and get along with others Extremely difficult     Patient sees psychiatry. Patient is also dealing with father's recent hospice admission.

## 2019-07-10 NOTE — PROGRESS NOTES
Referral date before 2011, source PCP Dr Mingo Hernandez and for chronic pelvic pain due to interstitial cystitis. HPI:  Jagdeep Navarrete is a 45 y.o. female here for f/u visit for ongoing evaluation of chronic pelvis pain due to interstitial cystitis. Hx implanted InterStim which required revision in 2013. Pt was last seen here on 6/17/19. Pt denies interval changes on the character or distribution of pain but reports increased intensity due to opiate weaning. Pain is located like a belt all the way around her trunk. The pain is described as aching to stabbing. Pain at its best is 4/10. Pain at its worse is 10/10. The pain is worsened by pelvic floor PT in past. Symptoms are improved by Cymbalta, Baclofen, Flexeril and Dilaudid. CBD oil helps with fibromyalgia pain. Pt reports no relief from Methadone. Pt has tried motrin, tylenol and topical compound cream with no perceived benefit. Lyrica with significant weight gain across her abdomen causing increased pain. Since last visit, she reports her father going into Hospice care yesterday causing her increased stress therefore increasing her pain. She does not Elavil helping some dorys with sleep. She still has been unable to get Belladonna opium filled as no pharmacy has it or unable to get it. She is in pelvic floor PT currently.       Social History     Socioeconomic History    Marital status:      Spouse name: Not on file    Number of children: Not on file    Years of education: Not on file    Highest education level: Not on file   Occupational History    Not on file   Social Needs    Financial resource strain: Not on file    Food insecurity:     Worry: Not on file     Inability: Not on file    Transportation needs:     Medical: Not on file     Non-medical: Not on file   Tobacco Use    Smoking status: Former Smoker     Packs/day: 0.50     Years: 10.00     Pack years: 5.00    Smokeless tobacco: Never Used   Substance and Sexual Activity    Alcohol use: Yes     Comment: 1 every 2 months    Drug use: No    Sexual activity: Not on file     Comment: hysterectomy   Lifestyle    Physical activity:     Days per week: Not on file     Minutes per session: Not on file    Stress: Not on file   Relationships    Social connections:     Talks on phone: Not on file     Gets together: Not on file     Attends Qu[employer] service: Not on file     Active member of club or organization: Not on file     Attends meetings of clubs or organizations: Not on file     Relationship status: Not on file    Intimate partner violence:     Fear of current or ex partner: Not on file     Emotionally abused: Not on file     Physically abused: Not on file     Forced sexual activity: Not on file   Other Topics Concern    Not on file   Social History Narrative    Not on file     Family History   Problem Relation Age of Onset    Post-op Nausea/Vomiting Sister     Malignant Hyperthermia Neg Hx     Pseudocholinesterase Deficiency Neg Hx     Delayed Awakening Neg Hx     Emergence Delirium Neg Hx     Post-op Cognitive Dysfunction Neg Hx      Allergies   Allergen Reactions    Augmentin [Amoxicillin-Pot Clavulanate] Anaphylaxis    Amoxicillin Hives and Itching     Itching of throat    Ciprofloxacin Other (comments) and Itching     Redness of arm above IV site    Darvocet A500 [Propoxyphene N-Acetaminophen] Hives    Doxycycline Unknown (comments)    Ketorolac Tromethamine Itching and Hives    Macrobid [Nitrofurantoin Monohyd/M-Cryst] Hives    Morphine Unknown (comments)     Pt states she's had morphine, she's not allergic    Nalbuphine Hives and Other (comments)     Muscle spasms    Phenergan [Promethazine] Other (comments)     Makes me feel like Im on fire from the inside out and causes involuntary muscle spasms.      Vibramycin [Doxycycline Calcium] Hives and Itching     Itching of the throat     Past Medical History:   Diagnosis Date    Anxiety     Arthritis     osteopenia    Chronic kidney disease     kidney stones    Chronic pain     bladder    Depression     Fibromyalgia     Interstitial cystitis     Nausea & vomiting     Other ill-defined conditions(799.89)     chronic interstitual cyctitis    Other ill-defined conditions(799.89)     endometriosis    Psychiatric disorder      Past Surgical History:   Procedure Laterality Date    HX APPENDECTOMY      HX  SECTION      HX HEENT      oral surgery wisdom teeth extractiion    HX HYSTERECTOMY      LAVH    HX PELVIC LAPAROSCOPY      x 5    HX UROLOGICAL      cysto and hydrodistention x 4    HX UROLOGICAL      Interstim implant    HX UROLOGICAL  2013    revision interstim     Current Outpatient Medications on File Prior to Visit   Medication Sig    [START ON 2019] HYDROmorphone (DILAUDID) 8 mg tablet Take 1 Tab by mouth three (3) times daily as needed for Pain for up to 30 days. Max Daily Amount: 24 mg. Indications: Pain    epigallocatechin gallate (GREEN TEA EXTRACT) by Does Not Apply route.  APPLE CIDER VINEGAR PO Take  by mouth.  aloe vera 25 mg cap Take  by mouth.  multivitamin,stress formula (STRESS PO) Take  by mouth.  acetaminophen (TYLENOL) 325 mg tablet Take  by mouth every four (4) hours as needed for Pain.  therapeutic multivitamin-minerals (THERAGRAN-M) tablet Take 1 Tab by mouth daily.  DULoxetine (CYMBALTA) 60 mg capsule Take 1 Cap by mouth two (2) times a day.  baclofen (LIORESAL) 10 mg tablet Take 1 or 2 tablets PO as needed up to 3 times daily. Maximum 4 tablets daily.  cyclobenzaprine (FLEXERIL) 5 mg tablet Take 1 or 2 tablets as needed nightly for muscle spasm    echinacea 400 mg cap Take 800 mg by mouth two (2) times a day.  OTHER Hemp oil as needed    pseudoephedrine (SUDAFED) 30 mg tablet Take  by mouth every four (4) hours as needed for Congestion.  phenazopyridine HCl (AZO PO) Take 1 Tab by mouth daily.     senna (SENNA) 8.6 mg tablet Take 1 Tab by mouth daily as needed for Constipation.  diphenhydrAMINE (BENADRYL) 25 mg capsule Take 50 mg by mouth two (2) times daily as needed.  EPINEPHrine (AUVI-Q) 0.3 mg/0.3 mL (1:1,000) injection 0.3 mL by IntraMUSCular route once as needed for Other (severe allergic reaction ) for 1 dose.  naloxone (NARCAN) 4 mg/actuation nasal spray Use 1 spray intranasally, then discard. Repeat with new spray every 2 min as needed for opioid overdose symptoms, alternating nostrils. No current facility-administered medications on file prior to visit. ROS:  Reports nausea (sinus related), shortness of breath (sinus related), abdominal pain, one fall, dizziness, depression and anxiety. Denies fever, chills, vomiting, diarrhea, constipation, chest pain, abdominal pain, weakness, trouble swallowing, changes in vision, changes in hearing, bladder incontinence, bowel incontinence, suicidal ideations, homicidal ideations or alcohol use. Opioid specific risk: panic disorder, anxiety, depression, constipation  Opioid specific history: patient has been on continuous opioid therapy for approximately 6 years. She has not had symptoms of withdrawal in the past. She expresses a fear of developing withdrawal symptoms. She states that she would like to be opioid free. Vitals:    07/10/19 1004   BP: 111/75   Pulse: (!) 129   Resp: 18   Temp: 99.5 °F (37.5 °C)   TempSrc: Oral   SpO2: 98%   Weight: 57.2 kg (126 lb)   Height: 5' 1\" (1.549 m)   PainSc:   8   PainLoc: Abdomen          Physical exam:  AFVSS with tachycardia, no acute distress, normal body habitus. A&OXs 3. Normocephalic, atraumatic. Conjugate gaze, clear sclerae. Speech is clear and appropriate. Mood is appropriate and patient is cooperative. Gait and balance are within functional limits. Non-labored breathing.        Calculated MEQ - 96  Naloxone rescue - yes  Prophylactic bowel program - yes  Date of last OCA 1/8/19  Last UDS 6/17/19, consistent POC, pending confirmatory testing  Prior UDS 1/4/19, consistent    date checked today, findings consistent    Primary Care Physician  Landry Earl, [address] [facility]  [phone]    Today   Last Visit Prior Visit(s)  PGIC - 1 & 8/9  2 & 8/9  2 & 8 2 & 8 2 & 4  TEE - 72%  72%  64% 64% 62%  COMM - 16  17  16 14 17    PHQ -- .  3 most recent PHQ Screens 7/10/2019   PHQ Not Done -   Little interest or pleasure in doing things Nearly every day   Feeling down, depressed, irritable, or hopeless Nearly every day   Total Score PHQ 2 6   Trouble falling or staying asleep, or sleeping too much Nearly every day   Feeling tired or having little energy Nearly every day   Poor appetite, weight loss, or overeating Not at all   Feeling bad about yourself - or that you are a failure or have let yourself or your family down Several days   Trouble concentrating on things such as school, work, reading, or watching TV Not at all   Moving or speaking so slowly that other people could have noticed; or the opposite being so fidgety that others notice Not at all   Thoughts of being better off dead, or hurting yourself in some way Not at all   PHQ 9 Score 13   How difficult have these problems made it for you to do your work, take care of your home and get along with others Extremely difficult       DSM V-OUD Screen--mild to moderate      Assessment/Plan:     ICD-10-CM ICD-9-CM    1. Pelvic pain in female R10.2 625.9 amitriptyline (ELAVIL) 25 mg tablet   2. Generalized abdominal pain R10.84 789.07 amitriptyline (ELAVIL) 25 mg tablet   3. Interstitial cystitis N30.10 595.1 amitriptyline (ELAVIL) 25 mg tablet   4. Chronic pain syndrome G89.4 338.4 amitriptyline (ELAVIL) 25 mg tablet   5. Encounter for long-term (current) use of high-risk medication Z79.899 V58.69    6. Myalgia M79.10 729.1    7.  Muscle spasm M62.838 728.85         ---I do not recommend long term opioid therapy for this patient at this time for their chronic pain; risks outweigh the potential benefits. Pt currently taking dilaudid 8mg up to 3 times a day as needed with a total of 90 tabs to be used over 30 days. Their MME is 96. She has been successfully weaned off Methadone with last fill date of 1/14/19. --Today, we will pause the weaning of patients opioid medication with a goal of being opioid free, pending safety and compliance. Pt instructed to call if they experience any signs of withdrawal (diarrhea, nausea, vomiting, sweating or chills, agitation, itching). Pt has pre-printed script due to fill today therefore no script was printed for her. She was advised to call 5-7 days before running out of this script for refill of their medication. --At next office visit, the plan is to provide patient with dilaudid 8mg up to 3 times a day as needed with a total of 85 tabs to be budgeted over 30 days. If patient has difficulty with the wean or difficulty with cravings we will consider referral to mental health for ongoing assessment and treatment for opioid use disorder. --Continue Flexeril, Baclofen and Cymbalta as previously recommended. --Elavil helping will continue this dose and refill provided today. Consider increasing if needed in the future. Pt denies any problems or side effects from this medication   --Belladonna-opium ordered previously for acute flare only; she has not been able to find a pharmacy that has it or can order it. --Close follow up urology and neurology. --Continue to discuss pain pump and SCS with patient. --Lidocaine and/or ketamine infusions remain an option for trial.  --Continue pelvic floor PT and pain psychology as previously recommended. --Follow up ongoing assessment and ongoing development of integrative and comprehensive plan of care for chronic pain. Goals:   To establish complementary and integrative plan of care to address chronic pain issues while minimizing pharmaceuticals to maximize patient's function improve quality of life. Education:  Patient again educated on the importance of strict compliance with the opioid care agreement while on opioid therapy. Patient also again educated that they should avoid driving while on chronic opioid therapy. Also advised to avoid alcohol and to avoid benzodiazepines while on opioid therapy. Handouts given regarding opioid safety. Patient Homework:  Continue to independently investigate yoga for persons with chronic pain. Follow-up and Dispositions    · Return in about 3 months (around 10/10/2019) for 30 min. [address] was used for portions of this report. Unintended errors may occur.

## 2019-07-12 ENCOUNTER — TELEPHONE (OUTPATIENT)
Dept: PAIN MANAGEMENT | Age: 44
End: 2019-07-12

## 2019-07-15 ENCOUNTER — TELEPHONE (OUTPATIENT)
Dept: PAIN MANAGEMENT | Age: 44
End: 2019-07-15

## 2019-07-15 NOTE — TELEPHONE ENCOUNTER
Pa for hydromorphone 8mg #90/30 submitted to [address] [place] [address]. Waiting on a response.

## 2019-08-08 DIAGNOSIS — R10.2 PELVIC PAIN IN FEMALE: ICD-10-CM

## 2019-08-08 DIAGNOSIS — G89.4 CHRONIC PAIN SYNDROME: ICD-10-CM

## 2019-08-08 DIAGNOSIS — M79.10 MYALGIA: ICD-10-CM

## 2019-08-08 DIAGNOSIS — M62.838 MUSCLE SPASM: ICD-10-CM

## 2019-08-08 DIAGNOSIS — N30.10 INTERSTITIAL CYSTITIS: ICD-10-CM

## 2019-08-08 DIAGNOSIS — R10.84 GENERALIZED ABDOMINAL PAIN: Primary | ICD-10-CM

## 2019-08-08 DIAGNOSIS — R10.84 GENERALIZED ABDOMINAL PAIN: ICD-10-CM

## 2019-08-08 RX ORDER — BACLOFEN 10 MG/1
TABLET ORAL
Qty: 120 TAB | Refills: 2 | Status: SHIPPED | OUTPATIENT
Start: 2019-08-08 | End: 2019-10-28 | Stop reason: SDUPTHER

## 2019-08-08 RX ORDER — HYDROMORPHONE HYDROCHLORIDE 8 MG/1
8 TABLET ORAL
Qty: 90 TAB | Refills: 0 | Status: SHIPPED | OUTPATIENT
Start: 2019-08-08 | End: 2019-08-30 | Stop reason: SDUPTHER

## 2019-08-08 RX ORDER — DULOXETIN HYDROCHLORIDE 60 MG/1
60 CAPSULE, DELAYED RELEASE ORAL 2 TIMES DAILY
Qty: 60 CAP | Refills: 2 | Status: SHIPPED | OUTPATIENT
Start: 2019-08-08 | End: 2019-10-28 | Stop reason: SDUPTHER

## 2019-08-08 RX ORDER — CYCLOBENZAPRINE HCL 5 MG
TABLET ORAL
Qty: 60 TAB | Refills: 2 | Status: SHIPPED | OUTPATIENT
Start: 2019-08-08 | End: 2019-10-28 | Stop reason: SDUPTHER

## 2019-08-08 NOTE — TELEPHONE ENCOUNTER
Spoke with patient after getting 2 points of identity and informed her that her script for dilaudid has been issued and she can come to office by 3:45pm with a valid picture ID and pick it up - patient coming now.

## 2019-08-08 NOTE — TELEPHONE ENCOUNTER
Approved. Please advise that in the future she will need to request these medications via telephone 5-7 days before running out as per our protocol.

## 2019-08-08 NOTE — TELEPHONE ENCOUNTER
Chirag Duran has called requesting a refill of their controlled medication Dilaudid  for the management of chronic . Last office visit date: 07/10/19  Last opioid care agreement 01/04/19  Last UDS was done     Date last  was pulled and reviewed : 08/08/19  Last fill date for medication was 07/12/19    Was the patient compliant when the above report was pulled? yes    Analgesia: 50%    Aberrancies: no    ADL's: yes    Adverse Reaction: no    Provider's last note and plan of care reviewed? yes  Request forwarded to provider for review.

## 2019-08-30 DIAGNOSIS — N30.10 INTERSTITIAL CYSTITIS: ICD-10-CM

## 2019-08-30 DIAGNOSIS — R10.84 GENERALIZED ABDOMINAL PAIN: ICD-10-CM

## 2019-08-30 DIAGNOSIS — R10.2 PELVIC PAIN IN FEMALE: ICD-10-CM

## 2019-08-30 DIAGNOSIS — G89.4 CHRONIC PAIN SYNDROME: ICD-10-CM

## 2019-08-30 NOTE — TELEPHONE ENCOUNTER
Patient left message [phone]  11:07am asking for refill of pain medication dilaudid    Vickie Paige  [birth year]    [phone]    Medication -dilaudid    30%    ADL - can do personal hygiene, son helps with cooking/cleaning    Side effects - none    **Patient asked to remind PA that they had a discussion and had decided not to cut number of pills on refill until visit in October 9th**

## 2019-09-04 RX ORDER — HYDROMORPHONE HYDROCHLORIDE 8 MG/1
8 TABLET ORAL
Qty: 90 TAB | Refills: 0 | Status: SHIPPED | OUTPATIENT
Start: 2019-09-07 | End: 2019-10-07 | Stop reason: SDUPTHER

## 2019-09-04 NOTE — TELEPHONE ENCOUNTER
Spoke with patient after getting 2 points of identity and informed her that we have a script ready to pickup at the office, please come by 3:45pm with a valid picture ID - patient confirmed.

## 2019-09-04 NOTE — TELEPHONE ENCOUNTER
Opal Dennis has called requesting a refill of their controlled medication Dilaudid for the management of chronic . Last office visit date: 07/10/19  Last opioid care agreement 01/04/19  Last UDS was done 06/17/19    Date last  was pulled and reviewed : 09/04/19  Last fill date for medication was 08/08/19    Was the patient compliant when the above report was pulled? yes    Analgesia: 30%    Aberrancies: no    ADL's: yes with the help of her son with cleaning and cooking     Adverse Reaction: no    Provider's last note and plan of care reviewed? yes  Request forwarded to provider for review.

## 2019-10-07 ENCOUNTER — OFFICE VISIT (OUTPATIENT)
Dept: PAIN MANAGEMENT | Age: 44
End: 2019-10-07

## 2019-10-07 VITALS
HEART RATE: 125 BPM | BODY MASS INDEX: 23.79 KG/M2 | OXYGEN SATURATION: 97 % | HEIGHT: 61 IN | WEIGHT: 126 LBS | DIASTOLIC BLOOD PRESSURE: 97 MMHG | SYSTOLIC BLOOD PRESSURE: 131 MMHG | TEMPERATURE: 99.2 F | RESPIRATION RATE: 20 BRPM

## 2019-10-07 DIAGNOSIS — R10.2 PELVIC PAIN IN FEMALE: ICD-10-CM

## 2019-10-07 DIAGNOSIS — M62.838 MUSCLE SPASM: ICD-10-CM

## 2019-10-07 DIAGNOSIS — N30.10 INTERSTITIAL CYSTITIS: Primary | ICD-10-CM

## 2019-10-07 DIAGNOSIS — R10.84 GENERALIZED ABDOMINAL PAIN: ICD-10-CM

## 2019-10-07 DIAGNOSIS — G89.4 CHRONIC PAIN SYNDROME: ICD-10-CM

## 2019-10-07 DIAGNOSIS — Z79.899 ENCOUNTER FOR LONG-TERM (CURRENT) USE OF HIGH-RISK MEDICATION: ICD-10-CM

## 2019-10-07 DIAGNOSIS — M79.10 MYALGIA: ICD-10-CM

## 2019-10-07 RX ORDER — AMITRIPTYLINE HYDROCHLORIDE 25 MG/1
50 TABLET, FILM COATED ORAL
Qty: 60 TAB | Refills: 2 | Status: SHIPPED | OUTPATIENT
Start: 2019-10-07 | End: 2019-11-06

## 2019-10-07 RX ORDER — HYDROMORPHONE HYDROCHLORIDE 8 MG/1
8 TABLET ORAL
Qty: 90 TAB | Refills: 0 | Status: SHIPPED | OUTPATIENT
Start: 2019-10-07 | End: 2019-10-28 | Stop reason: SDUPTHER

## 2019-10-07 NOTE — PROGRESS NOTES
Referred before 2011, source PCP Dr Anahi Guadalupe and for chronic pelvic pain due to interstitial cystitis. Pt was last seen here on  7/10/2019  Calculated MEQ - 96  Naloxone rescue -yes  Prophylactic bowel program -yes  Date of last OCA  1/4/2019  Last UDS  6/17/2019,findings consistent.  checked today and findings were consistent. GIC-2 and 8  TEE -74%  COMM- 20     HPI:  Valentine Lagos  Is a 45 y.o. female here for f/u visit for ongoing evaluation of chronic pelvic pain with history of interstitial cystitis. Pt denies interval changes in the character or distribution of pain. Pain is located  as a band across the lower abdomen bilaterally described as stabbing and aching. She reports burning vaginal pain. The pain is described as aching stabbing and burning and ranges from 6 to 10/10. Physical therapy concluded and was causing exacerbation of pain. Mental health is [facility], she follows weekly. Current/recent pain related medications include:  - Tylenol 325 mg as needed  - Baclofen 10 mg, 1-2 tabs as needed 3 times daily, continues to be helpful for the back pain. - Flexeril 10 mg as needed nightly, not very helpful. - Cymbalta 60 mg twice daily still helpful for pain and depression.   -Elavil 25mg nightly is helpful.   -Senna as needed  -Narcan  - Hydromorphone 8 mg up to 3 times daily  Pt reports partial but incomplete analgesia with the current opioid regimen which helps to improve activity tolerance and function. Pt denies aberrant behaviors or any adverse effects.  -Lyrica was not tolerated in the past secondary to weight gain. - Other medications tried without perceived benefit include methadone, Motrin, Tylenol, compounded pain cream.  --methenamine, she can not recall if she has tried this. --intervesicular lidocaine was previously helpful but the treatment lost efficacy. Last done about 2 years ago.        ROS:Review of systems is negative for fever, , vomiting, diarrhea, constipation, chest pain, shortness of breath, trouble swallowing, acute changes in vision, acute changes in hearing, dizziness, falls,suicidal ideation, homicidal ideation, alcohol use. Review of systems positive for abdominal pain,  weakness,  chills, nausea,  depression, anxiety,     Opioid specific risk: panic disorder, anxiety, depression, recurrent constipation, COMM today is 20. Opioid specific history: patient has been on continuous opioid therapy for approximately 7 years. She expresses a fear of developing withdrawal symptoms. She states that she would like to be opioid free. Visit Vitals  BP (!) 131/97 (BP 1 Location: Left arm, BP Patient Position: Sitting)   Pulse (!) 125   Temp 99.2 °F (37.3 °C) (Oral)   Resp 20   Ht 5' 1\" (1.549 m)   Wt 57.2 kg (126 lb)   SpO2 97%   BMI 23.81 kg/m²        PE:  VSS with elevated blood pressure, low grade temperature, no acute distress, normal body habitus. A&OXs 3. Speech is clear and appropriate. Mood is pleasant and appropriate. Patient is cooperative. Abdomen is soft, nondistended. Positive bowel sounds in all 4 abdominal quadrants. Abdomen is diffusely tender to palpation. There is positive Tinel's at the right elbow. There is tenderness to palpation of the medial right elbow. Full active range of motion for right elbow flexion and extension. Gait is within functional limits. Balance is within functional limits.       Primary Care Physician  Aaron Goodman MD  [address] [place] [address] Drive  [phone]        PHQ -- .  3 most recent PHQ Screens 10/7/2019   PHQ Not Done -   Little interest or pleasure in doing things Several days   Feeling down, depressed, irritable, or hopeless More than half the days   Total Score PHQ 2 3   Trouble falling or staying asleep, or sleeping too much More than half the days   Feeling tired or having little energy Nearly every day   Poor appetite, weight loss, or overeating Several days   Feeling bad about yourself - or that you are a failure or have let yourself or your family down Several days   Trouble concentrating on things such as school, work, reading, or watching TV Several days   Moving or speaking so slowly that other people could have noticed; or the opposite being so fidgety that others notice Not at all   Thoughts of being better off dead, or hurting yourself in some way Not at all   PHQ 9 Score 11   How difficult have these problems made it for you to do your work, take care of your home and get along with others Somewhat difficult            Assessment/Plan:   Encounter Diagnoses     ICD-10-CM ICD-9-CM   1. Interstitial cystitis N30.10 595.1   2. Chronic pain syndrome G89.4 338.4   3. Pelvic pain in female R10.2 625.9   4. Generalized abdominal pain R10.84 789.07   5. Muscle spasm M62.838 728.85   6. Myalgia M79.10 729.1   7. Encounter for long-term (current) use of high-risk medication Z79.899 V58.69     --I do not recommend long-term opioid therapy for this person's chronic pain. I believe the risks outweigh any potential benefits. We will  pause the gradual opioid wean. Patient was educated on signs and symptoms of opioid withdrawal and advised to call the clinic should these symptoms arise so that we may provide support as needed. --She was provided with Dilaudid 8 mg up to 3 times daily with 90 tablets provided for 30 days. Maintain this rate until next office visit. Then assess for ongoing opioid wean. - Continue Flexeril, if no clear benefit at next office visit consider discontinuation.  -Continue baclofen  -Continue Cymbalta  -Increase Elavil to 50 mg nightly. If tolerated continue upward titration to desired effect.  - Consider trial of Savella. --We recommend close follow up urology and neurology.  -Continue over-the-counter Tylenol and naproxen as needed  --Continue to discuss pain pump and SCS with patient.   --Lidocaine and/or ketamine infusions remain an option for trial.  --Patient will ask urologist if methenamine is a viable option for the interstitial cystitis. --Follow-up  in 3 months with Xavi. GOALS:  To establish complementary and integrative plan of care to address chronic pain issues while minimizing pharmaceuticals to maximize patient's function improve quality of life. Education:  Patient again educated on the importance of strict compliance with the opioid care agreement while on opioid therapy. Patient also again educated that they should avoid driving while on chronic opioid therapy. Also advised to avoid alcohol and to avoid benzodiazepines while on opioid therapy. A total of 34 minutes were spent with the patient, of which more than half of the time was spent counseling and/or coordinating care. F/u:. Follow-up Disposition:  Follow-up and Dispositions    · Return in about 3 months (around 1/7/2020) for 30 min.

## 2019-10-07 NOTE — PROGRESS NOTES
Nursing Notes    Patient presents to the office today in follow-up. Patient rates her pain at 8/10 on the numerical pain scale. Reviewed medications with counts as follows:    Rx Date filled Qty Dispensed Pill Count Last Dose Short   Dilaudid 8 mg - pt states that she had 9 pills left but her cat knocked over her bottle into water and destroyed the medication. 09/07/19 90 0 Saturday morning                                        reviewed YES  Any aberrancies noted on  NO  Last opioid agreement 01/08/19  Last urine drug screen 06/17/19    Comments:     POC UDS was not performed in office today. Any new labs or imaging since last appointment? NO    Have you been to an emergency room (ER) or urgent care clinic since your last visit? YES. Pt went to an urgent care for yeast infection under right breast            Have you been hospitalized since your last visit? NO     If yes, where, when, and reason for visit? Have you seen or consulted any other health care providers outside of the [address] [place] [address]  since your last visit? NO     If yes, where, when, and reason for visit? Ms. Curtis Ace has a reminder for a \"due or due soon\" health maintenance. I have asked that she contact her primary care provider for follow-up on this health maintenance.     3 most recent PHQ Screens 10/7/2019   PHQ Not Done -   Little interest or pleasure in doing things Several days   Feeling down, depressed, irritable, or hopeless More than half the days   Total Score PHQ 2 3   Trouble falling or staying asleep, or sleeping too much More than half the days   Feeling tired or having little energy Nearly every day   Poor appetite, weight loss, or overeating Several days   Feeling bad about yourself - or that you are a failure or have let yourself or your family down Several days   Trouble concentrating on things such as school, work, reading, or watching TV Several days   Moving or speaking so slowly that other people could have noticed; or the opposite being so fidgety that others notice Not at all   Thoughts of being better off dead, or hurting yourself in some way Not at all   PHQ 9 Score 11   How difficult have these problems made it for you to do your work, take care of your home and get along with others Somewhat difficult     Pt is being treated by psychiatry and she goes regularly. Provider aware of the above score.

## 2019-10-28 DIAGNOSIS — R10.84 GENERALIZED ABDOMINAL PAIN: ICD-10-CM

## 2019-10-28 DIAGNOSIS — R10.2 PELVIC PAIN IN FEMALE: ICD-10-CM

## 2019-10-28 DIAGNOSIS — M62.838 MUSCLE SPASM: ICD-10-CM

## 2019-10-28 DIAGNOSIS — N30.10 INTERSTITIAL CYSTITIS: ICD-10-CM

## 2019-10-28 DIAGNOSIS — M79.10 MYALGIA: ICD-10-CM

## 2019-10-28 DIAGNOSIS — G89.4 CHRONIC PAIN SYNDROME: ICD-10-CM

## 2019-10-28 RX ORDER — BACLOFEN 10 MG/1
TABLET ORAL
Qty: 120 TAB | Refills: 2 | Status: SHIPPED | OUTPATIENT
Start: 2019-10-28

## 2019-10-28 RX ORDER — HYDROMORPHONE HYDROCHLORIDE 8 MG/1
8 TABLET ORAL
Qty: 90 TAB | Refills: 0 | Status: SHIPPED | OUTPATIENT
Start: 2019-11-01 | End: 2019-11-27 | Stop reason: SDUPTHER

## 2019-10-28 RX ORDER — CYCLOBENZAPRINE HCL 5 MG
TABLET ORAL
Qty: 60 TAB | Refills: 2 | Status: SHIPPED | OUTPATIENT
Start: 2019-10-28 | End: 2022-09-19

## 2019-10-28 RX ORDER — DULOXETIN HYDROCHLORIDE 60 MG/1
60 CAPSULE, DELAYED RELEASE ORAL 2 TIMES DAILY
Qty: 60 CAP | Refills: 2 | Status: SHIPPED | OUTPATIENT
Start: 2019-10-28

## 2019-10-28 NOTE — TELEPHONE ENCOUNTER
Pt is requesting to get her medications filled on 19 because she will be leaving to go out of the state to [place] for her father's . She will not be back in town until 11/10/19. Fill date of prescription set for 19.  If not approved by the provider, please adjust.

## 2019-10-28 NOTE — TELEPHONE ENCOUNTER
Brooke Charley has called requesting a refill of their controlled medication, dilaudid, flexeril, baclofen, and cymbalta, for the management of chronic bladder and pelvic pain. Last office visit date: 10/07/19  Last opioid care agreement 01/08/19  Last UDS was done 06/17/19    Date last  was pulled and reviewed : 10/28/19  Last fill date for medication was 10/07/19 for dilaudid 8 mg #90 tabs    Was the patient compliant when the above report was pulled? yes    Analgesia: pt reports a 30-40% pain relief with her current opioid medication regimen     Aberrancies: none noted     ADL's: pt reports that she is able to perform her normal daily tasks most of the time because she is taking her medication. Adverse Reaction: none reported by the pt at this time. Provider's last note and plan of care reviewed? yes  Request forwarded to provider for review.

## 2019-10-28 NOTE — TELEPHONE ENCOUNTER
Pt was called and informed that her prescriptions were approved by the physician. The pt was identified using 2 pt identifiers. The dilaudid prescription printed out at the office and will need to be picked up. The other 3 prescriptions went electronically to her pharmacy in [place]. She verbalized understanding and has no questions at this time.

## 2019-11-27 DIAGNOSIS — R10.2 PELVIC PAIN IN FEMALE: ICD-10-CM

## 2019-11-27 DIAGNOSIS — N30.10 INTERSTITIAL CYSTITIS: ICD-10-CM

## 2019-11-27 DIAGNOSIS — R10.84 GENERALIZED ABDOMINAL PAIN: ICD-10-CM

## 2019-11-27 DIAGNOSIS — Z79.899 ENCOUNTER FOR LONG-TERM (CURRENT) USE OF HIGH-RISK MEDICATION: Primary | ICD-10-CM

## 2019-11-27 DIAGNOSIS — Z79.899 ENCOUNTER FOR LONG-TERM (CURRENT) USE OF HIGH-RISK MEDICATION: ICD-10-CM

## 2019-11-27 RX ORDER — CLONIDINE HYDROCHLORIDE 0.1 MG/1
0.1 TABLET ORAL
Qty: 8 TAB | Refills: 0 | Status: SHIPPED | OUTPATIENT
Start: 2019-11-27 | End: 2019-11-27 | Stop reason: SDUPTHER

## 2019-11-27 RX ORDER — CLONIDINE HYDROCHLORIDE 0.1 MG/1
0.1 TABLET ORAL
Qty: 8 TAB | Refills: 0 | Status: SHIPPED | OUTPATIENT
Start: 2019-11-27 | End: 2022-09-19

## 2019-11-27 RX ORDER — HYDROXYZINE 25 MG/1
25 TABLET, FILM COATED ORAL
Qty: 12 TAB | Refills: 0 | Status: SHIPPED | OUTPATIENT
Start: 2019-11-27 | End: 2022-09-19

## 2019-11-27 RX ORDER — HYDROMORPHONE HYDROCHLORIDE 8 MG/1
8 TABLET ORAL
Qty: 90 TAB | Refills: 0 | Status: SHIPPED | OUTPATIENT
Start: 2019-11-29 | End: 2019-12-29

## 2019-11-27 NOTE — TELEPHONE ENCOUNTER
Renay Womack has called requesting a refill of their controlled medication, Dilaudid, for the management of chronic pain. Last office visit date: 10/7/19  Last opioid care agreement 1/4/19  Last UDS was done 6/17/19    Date last  was pulled and reviewed : 11/27/19  Last fill date for medication was 11/1/19    Was the patient compliant when the above report was pulled? yes    Analgesia: 40%    Aberrancies: none    ADL's: yes    Adverse Reaction: none  Provider's last note and plan of care reviewed? yes  Request forwarded to provider for review. Patient requesting a fill date of Friday 29th of November. Patient states her mother is not doing well and they are going out of town since her father passed away recently. Sibling called today and said to come ASAP.

## 2019-11-29 DIAGNOSIS — R10.2 PELVIC PAIN IN FEMALE: ICD-10-CM

## 2019-11-29 DIAGNOSIS — Z79.899 ENCOUNTER FOR LONG-TERM (CURRENT) USE OF HIGH-RISK MEDICATION: ICD-10-CM

## 2019-11-29 DIAGNOSIS — M79.10 MYALGIA: ICD-10-CM

## 2019-11-29 DIAGNOSIS — M46.1 SACROILIITIS (HCC): ICD-10-CM

## 2019-11-29 DIAGNOSIS — M62.838 MUSCLE SPASM: ICD-10-CM

## 2019-11-29 DIAGNOSIS — N30.10 INTERSTITIAL CYSTITIS: Primary | ICD-10-CM

## 2019-11-29 NOTE — TELEPHONE ENCOUNTER
Patient identified using two patient identifiers (name and ); patient advised prescriptions are ready for pick-up. Patient also informed  hours are Mon-Fri [phone]. Patient verbalized understanding.

## 2020-07-12 ENCOUNTER — APPOINTMENT (OUTPATIENT)
Dept: GENERAL RADIOLOGY | Age: 45
End: 2020-07-12
Attending: PHYSICIAN ASSISTANT
Payer: MEDICAID

## 2020-07-12 ENCOUNTER — HOSPITAL ENCOUNTER (EMERGENCY)
Age: 45
Discharge: HOME | End: 2020-07-12
Attending: EMERGENCY MEDICINE
Payer: MEDICAID

## 2020-07-12 ENCOUNTER — APPOINTMENT (OUTPATIENT)
Dept: CT IMAGING | Age: 45
End: 2020-07-12
Attending: PHYSICIAN ASSISTANT
Payer: MEDICAID

## 2020-07-12 VITALS
HEART RATE: 101 BPM | OXYGEN SATURATION: 99 % | SYSTOLIC BLOOD PRESSURE: 119 MMHG | TEMPERATURE: 98.4 F | RESPIRATION RATE: 9 BRPM | DIASTOLIC BLOOD PRESSURE: 80 MMHG

## 2020-07-12 DIAGNOSIS — Z20.822 PERSON UNDER INVESTIGATION FOR COVID-19: ICD-10-CM

## 2020-07-12 DIAGNOSIS — N39.0 URINARY TRACT INFECTION WITHOUT HEMATURIA, SITE UNSPECIFIED: Primary | ICD-10-CM

## 2020-07-12 LAB
ALBUMIN SERPL-MCNC: 4.4 G/DL (ref 3.4–5)
ALBUMIN/GLOB SERPL: 1 {RATIO} (ref 0.8–1.7)
ALP SERPL-CCNC: 105 U/L (ref 45–117)
ALT SERPL-CCNC: 27 U/L (ref 13–56)
ANION GAP SERPL CALC-SCNC: 8 MMOL/L (ref 3–18)
APPEARANCE UR: CLEAR
AST SERPL-CCNC: 17 U/L (ref 10–38)
ATRIAL RATE: 108 BPM
BACTERIA URNS QL MICRO: ABNORMAL /HPF
BASOPHILS # BLD: 0 K/UL (ref 0–0.1)
BASOPHILS NFR BLD: 0 % (ref 0–2)
BILIRUB SERPL-MCNC: 0.7 MG/DL (ref 0.2–1)
BILIRUB UR QL: ABNORMAL
BUN SERPL-MCNC: 19 MG/DL (ref 7–18)
BUN/CREAT SERPL: 19 (ref 12–20)
CALCIUM SERPL-MCNC: 9.1 MG/DL (ref 8.5–10.1)
CALCULATED P AXIS, ECG09: 64 DEGREES
CALCULATED R AXIS, ECG10: 59 DEGREES
CALCULATED T AXIS, ECG11: 88 DEGREES
CHLORIDE SERPL-SCNC: 104 MMOL/L (ref 100–111)
CK MB CFR SERPL CALC: NORMAL % (ref 0–4)
CK MB SERPL-MCNC: <1 NG/ML (ref 5–25)
CK SERPL-CCNC: 71 U/L (ref 26–192)
CO2 SERPL-SCNC: 23 MMOL/L (ref 21–32)
COLOR UR: ABNORMAL
CREAT SERPL-MCNC: 1.01 MG/DL (ref 0.6–1.3)
D DIMER PPP FEU-MCNC: 1.18 UG/ML(FEU)
DIAGNOSIS, 93000: NORMAL
DIFFERENTIAL METHOD BLD: ABNORMAL
EOSINOPHIL # BLD: 0.2 K/UL (ref 0–0.4)
EOSINOPHIL NFR BLD: 3 % (ref 0–5)
EPITH CASTS URNS QL MICRO: ABNORMAL /LPF (ref 0–5)
ERYTHROCYTE [DISTWIDTH] IN BLOOD BY AUTOMATED COUNT: 13.6 % (ref 11.6–14.5)
FLUAV AG NPH QL IA: NEGATIVE
FLUBV AG NOSE QL IA: NEGATIVE
GLOBULIN SER CALC-MCNC: 4.4 G/DL (ref 2–4)
GLUCOSE SERPL-MCNC: 112 MG/DL (ref 74–99)
GLUCOSE UR STRIP.AUTO-MCNC: NEGATIVE MG/DL
HCG UR QL: NEGATIVE
HCT VFR BLD AUTO: 38.5 % (ref 35–45)
HGB BLD-MCNC: 11.9 G/DL (ref 12–16)
HGB UR QL STRIP: NEGATIVE
KETONES UR QL STRIP.AUTO: 15 MG/DL
LEUKOCYTE ESTERASE UR QL STRIP.AUTO: ABNORMAL
LYMPHOCYTES # BLD: 1.4 K/UL (ref 0.9–3.6)
LYMPHOCYTES NFR BLD: 20 % (ref 21–52)
MCH RBC QN AUTO: 26.8 PG (ref 24–34)
MCHC RBC AUTO-ENTMCNC: 30.9 G/DL (ref 31–37)
MCV RBC AUTO: 86.7 FL (ref 74–97)
MONOCYTES # BLD: 0.5 K/UL (ref 0.05–1.2)
MONOCYTES NFR BLD: 7 % (ref 3–10)
MUCOUS THREADS URNS QL MICRO: ABNORMAL /LPF
NEUTS SEG # BLD: 4.7 K/UL (ref 1.8–8)
NEUTS SEG NFR BLD: 70 % (ref 40–73)
NITRITE UR QL STRIP.AUTO: NEGATIVE
P-R INTERVAL, ECG05: 130 MS
PH UR STRIP: 5 [PH] (ref 5–8)
PLATELET # BLD AUTO: 360 K/UL (ref 135–420)
PMV BLD AUTO: 9.7 FL (ref 9.2–11.8)
POTASSIUM SERPL-SCNC: 3.3 MMOL/L (ref 3.5–5.5)
PROT SERPL-MCNC: 8.8 G/DL (ref 6.4–8.2)
PROT UR STRIP-MCNC: ABNORMAL MG/DL
Q-T INTERVAL, ECG07: 342 MS
QRS DURATION, ECG06: 64 MS
QTC CALCULATION (BEZET), ECG08: 458 MS
RBC # BLD AUTO: 4.44 M/UL (ref 4.2–5.3)
RBC #/AREA URNS HPF: ABNORMAL /HPF (ref 0–5)
SODIUM SERPL-SCNC: 135 MMOL/L (ref 136–145)
SP GR UR REFRACTOMETRY: >1.03 (ref 1–1.03)
TROPONIN I SERPL-MCNC: <0.02 NG/ML (ref 0–0.04)
UROBILINOGEN UR QL STRIP.AUTO: 1 EU/DL (ref 0.2–1)
VENTRICULAR RATE, ECG03: 108 BPM
WBC # BLD AUTO: 6.8 K/UL (ref 4.6–13.2)
WBC URNS QL MICRO: ABNORMAL /HPF (ref 0–5)

## 2020-07-12 PROCEDURE — 87086 URINE CULTURE/COLONY COUNT: CPT

## 2020-07-12 PROCEDURE — 87077 CULTURE AEROBIC IDENTIFY: CPT

## 2020-07-12 PROCEDURE — 96376 TX/PRO/DX INJ SAME DRUG ADON: CPT

## 2020-07-12 PROCEDURE — 87804 INFLUENZA ASSAY W/OPTIC: CPT

## 2020-07-12 PROCEDURE — 71045 X-RAY EXAM CHEST 1 VIEW: CPT

## 2020-07-12 PROCEDURE — 0100U RESPIRATORY PANEL,PCR,NASOPHARYNGEAL: CPT

## 2020-07-12 PROCEDURE — 85025 COMPLETE CBC W/AUTO DIFF WBC: CPT

## 2020-07-12 PROCEDURE — 96361 HYDRATE IV INFUSION ADD-ON: CPT

## 2020-07-12 PROCEDURE — 74011636320 HC RX REV CODE- 636/320: Performed by: EMERGENCY MEDICINE

## 2020-07-12 PROCEDURE — 80053 COMPREHEN METABOLIC PANEL: CPT

## 2020-07-12 PROCEDURE — 87635 SARS-COV-2 COVID-19 AMP PRB: CPT

## 2020-07-12 PROCEDURE — 99285 EMERGENCY DEPT VISIT HI MDM: CPT

## 2020-07-12 PROCEDURE — 87040 BLOOD CULTURE FOR BACTERIA: CPT

## 2020-07-12 PROCEDURE — 96374 THER/PROPH/DIAG INJ IV PUSH: CPT

## 2020-07-12 PROCEDURE — 81025 URINE PREGNANCY TEST: CPT

## 2020-07-12 PROCEDURE — 81001 URINALYSIS AUTO W/SCOPE: CPT

## 2020-07-12 PROCEDURE — 74011250636 HC RX REV CODE- 250/636: Performed by: PHYSICIAN ASSISTANT

## 2020-07-12 PROCEDURE — 87186 SC STD MICRODIL/AGAR DIL: CPT

## 2020-07-12 PROCEDURE — 82550 ASSAY OF CK (CPK): CPT

## 2020-07-12 PROCEDURE — 71275 CT ANGIOGRAPHY CHEST: CPT

## 2020-07-12 PROCEDURE — 74011250637 HC RX REV CODE- 250/637: Performed by: PHYSICIAN ASSISTANT

## 2020-07-12 PROCEDURE — 93005 ELECTROCARDIOGRAM TRACING: CPT

## 2020-07-12 PROCEDURE — 85379 FIBRIN DEGRADATION QUANT: CPT

## 2020-07-12 RX ORDER — ONDANSETRON 2 MG/ML
4 INJECTION INTRAMUSCULAR; INTRAVENOUS
Status: COMPLETED | OUTPATIENT
Start: 2020-07-12 | End: 2020-07-12

## 2020-07-12 RX ORDER — SULFAMETHOXAZOLE AND TRIMETHOPRIM 800; 160 MG/1; MG/1
1 TABLET ORAL 2 TIMES DAILY
Qty: 6 TAB | Refills: 0 | Status: SHIPPED | OUTPATIENT
Start: 2020-07-12 | End: 2020-07-12

## 2020-07-12 RX ORDER — ONDANSETRON 4 MG/1
4 TABLET, FILM COATED ORAL
Qty: 15 TAB | Refills: 0 | Status: SHIPPED | OUTPATIENT
Start: 2020-07-12

## 2020-07-12 RX ORDER — SULFAMETHOXAZOLE AND TRIMETHOPRIM 800; 160 MG/1; MG/1
1 TABLET ORAL 2 TIMES DAILY
Qty: 14 TAB | Refills: 0 | Status: SHIPPED | OUTPATIENT
Start: 2020-07-12 | End: 2020-07-19

## 2020-07-12 RX ORDER — ACETAMINOPHEN 500 MG
1000 TABLET ORAL
Status: COMPLETED | OUTPATIENT
Start: 2020-07-12 | End: 2020-07-12

## 2020-07-12 RX ADMIN — SODIUM CHLORIDE 1000 ML: 900 INJECTION, SOLUTION INTRAVENOUS at 10:35

## 2020-07-12 RX ADMIN — ONDANSETRON 4 MG: 2 INJECTION INTRAMUSCULAR; INTRAVENOUS at 12:41

## 2020-07-12 RX ADMIN — ACETAMINOPHEN 1000 MG: 500 TABLET ORAL at 12:41

## 2020-07-12 RX ADMIN — IOPAMIDOL 100 ML: 755 INJECTION, SOLUTION INTRAVENOUS at 11:25

## 2020-07-12 RX ADMIN — ONDANSETRON 4 MG: 2 INJECTION INTRAMUSCULAR; INTRAVENOUS at 10:36

## 2020-07-12 NOTE — ED PROVIDER NOTES
EMERGENCY DEPARTMENT HISTORY AND PHYSICAL EXAM    Date: 7/12/2020  Patient Name: Kylie Duke    History of Presenting Illness     Chief Complaint   Patient presents with    Shortness of Breath         History Provided By: Patient    Chief Complaint: SOB      Additional History (Context):   10:01 AM  Kylie Duke is a 39 y.o. female with PMHX fibromyalgia presents to the emergency department C/O cough, shortness of breath, body aches, fever for the past 2 days. Patient began having nausea vomiting and diarrhea yesterday. She denies abdominal pain or urinary symptoms. Patient denies any exposure to anyone who is been sick recently, anyone has been out of the country or any suspected COVID. She denies chest pain however feels that her heart is speeding up. She denies any leg pain, swelling or calf tenderness. She denies any long trips recently, recent surgeries, recent immobilizations of an arm or leg, history of blood clots or cancer or any birth control or hormone use. PCP: Steve Reed MD    Current Outpatient Medications   Medication Sig Dispense Refill    ondansetron hcl (Zofran) 4 mg tablet Take 1 Tab by mouth every eight (8) hours as needed for Nausea. 15 Tab 0    trimethoprim-sulfamethoxazole (Bactrim DS) 160-800 mg per tablet Take 1 Tab by mouth two (2) times a day for 7 days. 14 Tab 0    hydrOXYzine HCl (ATARAX) 25 mg tablet Take 1 Tab by mouth every eight (8) hours as needed for Itching (itching, irritation, anxiety - PRN opiate withdrawl) for up to 12 doses. 12 Tab 0    cloNIDine HCl (CATAPRES) 0.1 mg tablet Take 1 Tab by mouth every twelve (12) hours as needed (restlessness, hot flashes, sweats - PRN opiate withdrawl) for up to 8 doses. 8 Tab 0    DULoxetine (CYMBALTA) 60 mg capsule Take 1 Cap by mouth two (2) times a day.  60 Cap 2    cyclobenzaprine (FLEXERIL) 5 mg tablet Take 1 or 2 tablets as needed nightly for muscle spasm 60 Tab 2    baclofen (LIORESAL) 10 mg tablet Take 1 or 2 tablets PO as needed up to 3 times daily. Maximum 4 tablets daily. 120 Tab 2    OTHER,NON-FORMULARY, Pt using hemp oil for her fibromyalgia      epigallocatechin gallate (GREEN TEA EXTRACT) by Does Not Apply route.  APPLE CIDER VINEGAR PO Take 2 Tabs by mouth daily.  aloe vera 25 mg cap Take 1 Cap by mouth daily.  multivitamin,stress formula (STRESS PO) Take 1 Tab by mouth daily.  acetaminophen (TYLENOL) 325 mg tablet Take  by mouth every four (4) hours as needed for Pain.  therapeutic multivitamin-minerals (THERAGRAN-M) tablet Take 1 Tab by mouth daily.  echinacea 400 mg cap Take 800 mg by mouth two (2) times daily as needed.  OTHER Hemp oil as needed      naloxone (NARCAN) 4 mg/actuation nasal spray Use 1 spray intranasally, then discard. Repeat with new spray every 2 min as needed for opioid overdose symptoms, alternating nostrils. 1 Each 1    pseudoephedrine (SUDAFED) 30 mg tablet Take  by mouth every four (4) hours as needed for Congestion.  phenazopyridine HCl (AZO PO) Take 1 Tab by mouth daily.  senna (SENNA) 8.6 mg tablet Take 1 Tab by mouth daily as needed for Constipation. 30 Tab 2    diphenhydrAMINE (BENADRYL) 25 mg capsule Take 50 mg by mouth two (2) times daily as needed.  EPINEPHrine (AUVI-Q) 0.3 mg/0.3 mL (1:1,000) injection 0.3 mL by IntraMUSCular route once as needed for Other (severe allergic reaction ) for 1 dose.  0.6 mL 1       Past History     Past Medical History:  Past Medical History:   Diagnosis Date    Anxiety     Arthritis     osteopenia    Chronic kidney disease     kidney stones    Chronic pain     bladder    Depression     Fibromyalgia     Interstitial cystitis     Nausea & vomiting     Other ill-defined conditions(749.89)     chronic interstitual cyctitis    Other ill-defined conditions(609.89)     endometriosis    Psychiatric disorder        Past Surgical History:  Past Surgical History:   Procedure Laterality Date    HX APPENDECTOMY      HX  SECTION      HX HEENT      oral surgery wisdom teeth extractiion    HX HYSTERECTOMY  2004    LAVH    HX PELVIC LAPAROSCOPY      x 5    HX UROLOGICAL      cysto and hydrodistention x 4    HX UROLOGICAL      Interstim implant    HX UROLOGICAL  2013    revision interstim       Family History:  Family History   Problem Relation Age of Onset    Post-op Nausea/Vomiting Sister     Malignant Hyperthermia Neg Hx     Pseudocholinesterase Deficiency Neg Hx     Delayed Awakening Neg Hx     Emergence Delirium Neg Hx     Post-op Cognitive Dysfunction Neg Hx        Social History:  Social History     Tobacco Use    Smoking status: Former Smoker     Packs/day: 0.50     Years: 10.00     Pack years: 5.00    Smokeless tobacco: Never Used   Substance Use Topics    Alcohol use: Yes     Comment: 1 every 2 months    Drug use: No       Allergies: Allergies   Allergen Reactions    Augmentin [Amoxicillin-Pot Clavulanate] Anaphylaxis    Amoxicillin Hives and Itching     Itching of throat    Ciprofloxacin Other (comments) and Itching     Redness of arm above IV site    Darvocet A500 [Propoxyphene N-Acetaminophen] Hives    Doxycycline Unknown (comments)    Ketorolac Tromethamine Itching and Hives    Macrobid [Nitrofurantoin Monohyd/M-Cryst] Hives    Morphine Unknown (comments)     Pt states she's had morphine, she's not allergic    Nalbuphine Hives and Other (comments)     Muscle spasms    Phenergan [Promethazine] Other (comments)     Makes me feel like Im on fire from the inside out and causes involuntary muscle spasms.  Vibramycin [Doxycycline Calcium] Hives and Itching     Itching of the throat       Review of Systems   Review of Systems   Constitutional: Positive for fever. Negative for chills. HENT: Positive for congestion, rhinorrhea and sore throat. Respiratory: Positive for cough and shortness of breath.     Cardiovascular: Negative for chest pain, palpitations and leg swelling. Gastrointestinal: Positive for diarrhea, nausea and vomiting. Negative for abdominal pain. Genitourinary: Negative for dysuria, flank pain, frequency and hematuria. Musculoskeletal: Positive for myalgias. Skin: Negative for rash. Neurological: Negative for weakness, numbness and headaches. All other systems reviewed and are negative. Physical Exam     Vitals:    07/12/20 1135 07/12/20 1200 07/12/20 1240 07/12/20 1300   BP: 110/74 116/80  119/80   Pulse:  (!) 114  (!) 101   Resp:  16  9   Temp:   98.4 °F (36.9 °C)    SpO2:  100%  99%     Physical Exam  Vitals signs and nursing note reviewed. Constitutional:       General: She is not in acute distress. Appearance: She is well-developed. Comments: Alert, well appearing, non toxic, speaking in full sentences without difficulty    HENT:      Head: Normocephalic and atraumatic. Right Ear: Tympanic membrane, ear canal and external ear normal. Tympanic membrane is not perforated, erythematous, retracted or bulging. Left Ear: Tympanic membrane, ear canal and external ear normal. Tympanic membrane is not perforated, erythematous, retracted or bulging. Nose: Nose normal. No mucosal edema or rhinorrhea. Right Sinus: No maxillary sinus tenderness or frontal sinus tenderness. Left Sinus: No maxillary sinus tenderness or frontal sinus tenderness. Mouth/Throat:      Mouth: Mucous membranes are not dry. Pharynx: Uvula midline. No oropharyngeal exudate, posterior oropharyngeal erythema or uvula swelling. Tonsils: No tonsillar abscesses. Neck:      Musculoskeletal: Normal range of motion and neck supple. Cardiovascular:      Rate and Rhythm: Normal rate and regular rhythm. Heart sounds: Normal heart sounds. No murmur. Pulmonary:      Effort: Pulmonary effort is normal. No respiratory distress. Breath sounds: Normal breath sounds. No wheezing or rales.       Comments: Lungs clear to auscultation bilaterally  Abdominal:      General: Bowel sounds are normal.      Palpations: Abdomen is soft. Tenderness: There is no abdominal tenderness. Musculoskeletal:      Comments: Legs without swelling or edema, no calf tenderness   Lymphadenopathy:      Cervical: No cervical adenopathy. Skin:     General: Skin is warm and dry. Findings: No rash. Neurological:      Mental Status: She is alert and oriented to person, place, and time. Psychiatric:         Judgment: Judgment normal.         Diagnostic Study Results     Labs:     Recent Results (from the past 12 hour(s))   CBC WITH AUTOMATED DIFF    Collection Time: 07/12/20 10:00 AM   Result Value Ref Range    WBC 6.8 4.6 - 13.2 K/uL    RBC 4.44 4.20 - 5.30 M/uL    HGB 11.9 (L) 12.0 - 16.0 g/dL    HCT 38.5 35.0 - 45.0 %    MCV 86.7 74.0 - 97.0 FL    MCH 26.8 24.0 - 34.0 PG    MCHC 30.9 (L) 31.0 - 37.0 g/dL    RDW 13.6 11.6 - 14.5 %    PLATELET 693 641 - 330 K/uL    MPV 9.7 9.2 - 11.8 FL    NEUTROPHILS 70 40 - 73 %    LYMPHOCYTES 20 (L) 21 - 52 %    MONOCYTES 7 3 - 10 %    EOSINOPHILS 3 0 - 5 %    BASOPHILS 0 0 - 2 %    ABS. NEUTROPHILS 4.7 1.8 - 8.0 K/UL    ABS. LYMPHOCYTES 1.4 0.9 - 3.6 K/UL    ABS. MONOCYTES 0.5 0.05 - 1.2 K/UL    ABS. EOSINOPHILS 0.2 0.0 - 0.4 K/UL    ABS. BASOPHILS 0.0 0.0 - 0.1 K/UL    DF AUTOMATED     METABOLIC PANEL, COMPREHENSIVE    Collection Time: 07/12/20 10:00 AM   Result Value Ref Range    Sodium 135 (L) 136 - 145 mmol/L    Potassium 3.3 (L) 3.5 - 5.5 mmol/L    Chloride 104 100 - 111 mmol/L    CO2 23 21 - 32 mmol/L    Anion gap 8 3.0 - 18 mmol/L    Glucose 112 (H) 74 - 99 mg/dL    BUN 19 (H) 7.0 - 18 MG/DL    Creatinine 1.01 0.6 - 1.3 MG/DL    BUN/Creatinine ratio 19 12 - 20      GFR est AA >60 >60 ml/min/1.73m2    GFR est non-AA 60 (L) >60 ml/min/1.73m2    Calcium 9.1 8.5 - 10.1 MG/DL    Bilirubin, total 0.7 0.2 - 1.0 MG/DL    ALT (SGPT) 27 13 - 56 U/L    AST (SGOT) 17 10 - 38 U/L    Alk. phosphatase 105 45 - 117 U/L    Protein, total 8.8 (H) 6.4 - 8.2 g/dL    Albumin 4.4 3.4 - 5.0 g/dL    Globulin 4.4 (H) 2.0 - 4.0 g/dL    A-G Ratio 1.0 0.8 - 1.7     CARDIAC PANEL,(CK, CKMB & TROPONIN)    Collection Time: 07/12/20 10:00 AM   Result Value Ref Range    CK - MB <1.0 <3.6 ng/ml    CK-MB Index  0.0 - 4.0 %     CALCULATION NOT PERFORMED WHEN RESULT IS BELOW LINEAR LIMIT    CK 71 26 - 192 U/L    Troponin-I, QT <0.02 0.0 - 0.045 NG/ML   D DIMER    Collection Time: 07/12/20 10:00 AM   Result Value Ref Range    D DIMER 1.18 (H) <0.46 ug/ml(FEU)   EKG, 12 LEAD, INITIAL    Collection Time: 07/12/20 10:09 AM   Result Value Ref Range    Ventricular Rate 108 BPM    Atrial Rate 108 BPM    P-R Interval 130 ms    QRS Duration 64 ms    Q-T Interval 342 ms    QTC Calculation (Bezet) 458 ms    Calculated P Axis 64 degrees    Calculated R Axis 59 degrees    Calculated T Axis 88 degrees    Diagnosis       Sinus tachycardia  Nonspecific T wave abnormality  Abnormal ECG  Confirmed by Aziza Reed MD, Mayuri Coates ([id number]) on 7/12/2020 2:06:35 PM     SARS-COV-2    Collection Time: 07/12/20 10:45 AM   Result Value Ref Range    SARS-CoV-2 PENDING     Specimen source Nasopharyngeal     INFLUENZA A & B AG (RAPID TEST)    Collection Time: 07/12/20 10:46 AM   Result Value Ref Range    Influenza A Antigen Negative NEG      Influenza B Antigen Negative NEG     URINALYSIS W/ RFLX MICROSCOPIC    Collection Time: 07/12/20 11:57 AM   Result Value Ref Range    Color DARK YELLOW      Appearance CLEAR      Specific gravity >1.030 (H) 1.005 - 1.030    pH (UA) 5.0 5.0 - 8.0      Protein TRACE (A) NEG mg/dL    Glucose Negative NEG mg/dL    Ketone 15 (A) NEG mg/dL    Bilirubin SMALL (A) NEG      Blood Negative NEG      Urobilinogen 1.0 0.2 - 1.0 EU/dL    Nitrites Negative NEG      Leukocyte Esterase SMALL (A) NEG     URINE MICROSCOPIC ONLY    Collection Time: 07/12/20 11:57 AM   Result Value Ref Range    WBC 11 to 20 0 - 5 /hpf    RBC 0 to 3 0 - 5 /hpf Epithelial cells FEW 0 - 5 /lpf    Bacteria 1+ (A) NEG /hpf    Mucus FEW (A) NEG /lpf   HCG URINE, QL. - POC    Collection Time: 07/12/20 12:06 PM   Result Value Ref Range    Pregnancy test,urine (POC) Negative NEG         Radiologic Studies:   CTA CHEST W OR W WO CONT   Final Result   IMPRESSION:      1. No evidence of pulmonary embolism. 2. No focal consolidative process in the chest.      XR CHEST PORT   Final Result   IMPRESSION:      No acute cardiopulmonary process. CT Results  (Last 48 hours)               07/12/20 1138  CTA CHEST W OR W WO CONT Final result    Impression:  IMPRESSION:       1. No evidence of pulmonary embolism. 2. No focal consolidative process in the chest.       Narrative:  EXAM: CTA chest       CLINICAL INDICATION/HISTORY:  Fever and dyspnea. COMPARISON: None       TECHNIQUE: Axial CT imaging from the thoracic inlet through the diaphragm with   intravenous contrast. Coronal and sagittal MIP reformats were generated. One or more dose reduction techniques were used on this CT: automated exposure   control, adjustment of the mAs and/or kVp according to patient size, and   iterative reconstruction techniques. The specific techniques used on this CT   exam have been documented in the patient's electronic medical record. Digital   Imaging and Communications in Medicine (DICOM) format image data are available   to nonaffiliated external healthcare facilities or entities on a secure, media   free, reciprocally searchable basis with patient authorization for at least a   12-month period after this study. _______________       FINDINGS:       EXAM QUALITY: Adequate opacification of the pulmonary arteries. PULMONARY ARTERIES: No evidence of pulmonary embolism. MEDIASTINUM: Normal heart size. No evidence of right heart strain. Aorta is   unremarkable. No pericardial effusion. LUNGS: No suspicious nodule or mass. No abnormal opacities.        PLEURA: Normal.       AIRWAY: Normal.       LYMPH NODES: No enlarged nodes. UPPER ABDOMEN: Unremarkable. OTHER: No acute or aggressive osseous abnormalities identified. SUPERFICIAL SOFT TISSUES: Unremarkable.       _______________               CXR Results  (Last 48 hours)               07/12/20 1020  XR CHEST PORT Final result    Impression:  IMPRESSION:       No acute cardiopulmonary process. Narrative:  EXAM: One view chest x-ray       CLINICAL INDICATION/HISTORY: Fever and dyspnea. COMPARISON: 6/17/2017. TECHNIQUE: Single AP view of the chest was obtained.       _______________       FINDINGS:       HEART, VESSELS, MEDIASTINUM: Heart size is normal. No vascular congestion. LUNGS, PLEURAL SPACES: The lungs are clear. No effusion or pneumothorax. BONY THORAX, SOFT TISSUES: Unremarkable.       _______________                 Medical Decision Making   I am the first provider for this patient. I reviewed the vital signs, available nursing notes, past medical history, past surgical history, family history and social history. Vital Signs: Reviewed the patient's vital signs. Pulse Oximetry Analysis: 100% on RA     Cardiac Monitor:  Rate: 103 bpm  Rhythm: sinus tach    EKG interpretation: (Preliminary)  10:01 AM   Sinus tachycardia rate 108 bpm, nonspecific T wave abnormality  EKG read by Sonja Kirkland PA-C at 10:30 AM       Records Reviewed: Nursing Notes and Old Medical Records    Procedures:  Procedures    ED Course:   10:01 AM Initial assessment performed. The patients presenting problems have been discussed, and they are in agreement with the care plan formulated and outlined with them. I have encouraged them to ask questions as they arise throughout their visit. Discussion:  Pt presents with cough body aches fever vomiting and diarrhea. Initially patient tachycardic. No abdominal pain.   Labs within normal limits with the exception of an elevated d-dimer therefore CTA obtained that showed no PE or infection. He does have UTI will culture and start on Bactrim. COVID test pending. Heart rate improved with IV hydration. O2 99% on room air strict return precautions given, pt offering no questions or complaints. Diagnosis and Disposition     DISCHARGE NOTE:  Mary Ellen Cuellar  results have been reviewed with her. She has been counseled regarding her diagnosis, treatment, and plan. She verbally conveys understanding and agreement of the signs, symptoms, diagnosis, treatment and prognosis and additionally agrees to follow up as discussed. She also agrees with the care-plan and conveys that all of her questions have been answered. I have also provided discharge instructions for her that include: educational information regarding their diagnosis and treatment, and list of reasons why they would want to return to the ED prior to their follow-up appointment, should her condition change. She has been provided with education for proper emergency department utilization. CLINICAL IMPRESSION:    1. Urinary tract infection without hematuria, site unspecified    2. Person under investigation for COVID-19        PLAN:  1. D/C Home  2. Discharge Medication List as of 7/12/2020  1:06 PM      START taking these medications    Details   ondansetron hcl (Zofran) 4 mg tablet Take 1 Tab by mouth every eight (8) hours as needed for Nausea. , Print, Disp-15 Tab,R-0      trimethoprim-sulfamethoxazole (Bactrim DS) 160-800 mg per tablet Take 1 Tab by mouth two (2) times a day for 3 days. , Print, Disp-6 Tab,R-0         CONTINUE these medications which have NOT CHANGED    Details   hydrOXYzine HCl (ATARAX) 25 mg tablet Take 1 Tab by mouth every eight (8) hours as needed for Itching (itching, irritation, anxiety - PRN opiate withdrawl) for up to 12 doses. , Print, Disp-12 Tab, R-0      cloNIDine HCl (CATAPRES) 0.1 mg tablet Take 1 Tab by mouth every twelve (12) hours as needed (restlessness, hot flashes, sweats - PRN opiate withdrawl) for up to 8 doses. , Print, Disp-8 Tab, R-0      DULoxetine (CYMBALTA) 60 mg capsule Take 1 Cap by mouth two (2) times a day., Normal, Disp-60 Cap, R-2      cyclobenzaprine (FLEXERIL) 5 mg tablet Take 1 or 2 tablets as needed nightly for muscle spasm, Normal, Disp-60 Tab, R-2      baclofen (LIORESAL) 10 mg tablet Take 1 or 2 tablets PO as needed up to 3 times daily. Maximum 4 tablets daily. , Normal, Disp-120 Tab, R-2      OTHER,NON-FORMULARY, Pt using hemp oil for her fibromyalgia, Historical Med      epigallocatechin gallate (GREEN TEA EXTRACT) by Does Not Apply route., Historical Med      APPLE CIDER VINEGAR PO Take 2 Tabs by mouth daily. , Historical Med      aloe vera 25 mg cap Take 1 Cap by mouth daily. , Historical Med      multivitamin,stress formula (STRESS PO) Take 1 Tab by mouth daily. , Historical Med      acetaminophen (TYLENOL) 325 mg tablet Take  by mouth every four (4) hours as needed for Pain., Historical Med      therapeutic multivitamin-minerals (THERAGRAN-M) tablet Take 1 Tab by mouth daily. , Historical Med      echinacea 400 mg cap Take 800 mg by mouth two (2) times daily as needed., Historical Med      OTHER Hemp oil as needed, Historical Med      naloxone (NARCAN) 4 mg/actuation nasal spray Use 1 spray intranasally, then discard. Repeat with new spray every 2 min as needed for opioid overdose symptoms, alternating nostrils. , Normal, Disp-1 Each, R-1      pseudoephedrine (SUDAFED) 30 mg tablet Take  by mouth every four (4) hours as needed for Congestion. , Historical Med      phenazopyridine HCl (AZO PO) Take 1 Tab by mouth daily. , Historical Med      senna (SENNA) 8.6 mg tablet Take 1 Tab by mouth daily as needed for Constipation. , Normal, Disp-30 Tab, R-2      diphenhydrAMINE (BENADRYL) 25 mg capsule Take 50 mg by mouth two (2) times daily as needed., Historical Med      EPINEPHrine (AUVI-Q) 0.3 mg/0.3 mL (1:1,000) injection 0.3 mL by IntraMUSCular route once as needed for Other (severe allergic reaction ) for 1 dose., Print, Disp-0.6 mL, R-1           3. Follow-up Information     Follow up With Specialties Details Why Contact Info    Kinza Blanco MD [facility]  call for follow up and recheck  [address] in [place]  [address] [facility], [place] [employer]  [phone]      [facility] EMERGENCY DEPT Emergency Medicine  If symptoms worsen [address] [place] [id number] [phone]                 Please note that this dictation was completed with [employer], the computer voice recognition software. Quite often unanticipated grammatical, syntax, homophones, and other interpretive errors are inadvertently transcribed by the computer software. Please disregard these errors. Please excuse any errors that have escaped final proofreading.

## 2020-07-12 NOTE — DISCHARGE INSTRUCTIONS
Urinary Tract Infection in Women: Care Instructions  Your Care Instructions     A urinary tract infection, or UTI, is a general term for an infection anywhere between the kidneys and the urethra (where urine comes out). Most UTIs are bladder infections. They often cause pain or burning when you urinate. UTIs are caused by bacteria and can be cured with antibiotics. Be sure to complete your treatment so that the infection goes away. Follow-up care is a key part of your treatment and safety. Be sure to make and go to all appointments, and call your doctor if you are having problems. It's also a good idea to know your test results and keep a list of the medicines you take. How can you care for yourself at home? · Take your antibiotics as directed. Do not stop taking them just because you feel better. You need to take the full course of antibiotics. · Drink extra water and other fluids for the next day or two. This may help wash out the bacteria that are causing the infection. (If you have kidney, heart, or liver disease and have to limit fluids, talk with your doctor before you increase your fluid intake.)  · Avoid drinks that are carbonated or have caffeine. They can irritate the bladder. · Urinate often. Try to empty your bladder each time. · To relieve pain, take a hot bath or lay a heating pad set on low over your lower belly or genital area. Never go to sleep with a heating pad in place. To prevent UTIs  · Drink plenty of water each day. This helps you urinate often, which clears bacteria from your system. (If you have kidney, heart, or liver disease and have to limit fluids, talk with your doctor before you increase your fluid intake.)  · Urinate when you need to. · Urinate right after you have sex. · Change sanitary pads often. · Avoid douches, bubble baths, feminine hygiene sprays, and other feminine hygiene products that have deodorants.   · After going to the bathroom, wipe from front to back.  When should you call for help? Call your doctor now or seek immediate medical care if:  · Symptoms such as fever, chills, nausea, or vomiting get worse or appear for the first time. · You have new pain in your back just below your rib cage. This is called flank pain. · There is new blood or pus in your urine. · You have any problems with your antibiotic medicine. Watch closely for changes in your health, and be sure to contact your doctor if:  · You are not getting better after taking an antibiotic for 2 days. · Your symptoms go away but then come back. Where can you learn more? Go to http://www.Knowledge Factor.com/  Enter D689 in the search box to learn more about \"Urinary Tract Infection in Women: Care Instructions. \"  Current as of: August 22, 2019               Content Version: 12.5  © 0796-7958 Healthwise, Incorporated. Care instructions adapted under license by [employer] (which disclaims liability or warranty for this information). If you have questions about a medical condition or this instruction, always ask your healthcare professional. [address] any warranty or liability for your use of this information.

## 2020-07-13 LAB

## 2020-07-15 LAB
BACTERIA SPEC CULT: ABNORMAL
CC UR VC: ABNORMAL
SARS-COV-2, COV2NT: NOT DETECTED
SERVICE CMNT-IMP: ABNORMAL
SOURCE, COVRS: NORMAL

## 2020-07-15 NOTE — CALL BACK NOTE
1:02 PM  07/15/2020    On bactrim for UTI. Sensitive per C&S. On appropriate tx.      Holger Saavdera PA-C

## 2020-07-18 LAB
BACTERIA SPEC CULT: NORMAL
BACTERIA SPEC CULT: NORMAL
SERVICE CMNT-IMP: NORMAL
SERVICE CMNT-IMP: NORMAL

## 2021-01-15 NOTE — TELEPHONE ENCOUNTER
Reviewed. OK to distribute prescription. 86 year old male patient with a history of HTN, CAD s/p PCI 2010 and 2018, RCC s/p partial kidney resection and subsequent CKD < pulmonary carcinoid tumor s/p resection (RUL), and persistent atrial fibrillation who underwent successful Watchman device implantation and now presents for 45 day post Watchman [place].     - Patient arrived in fasting state  - COVID negative on 1/12/21  - If well seated device is noted, will stop a/c and start ASA/ Plavix.

## 2021-04-11 NOTE — MR AVS SNAPSHOT
[address] [address] 
[phone] Patient: Isaura Olson MRN: [id number] :1975 Visit Information Date & Time Provider Department Dept. Phone Encounter #  
 2018  9:20 AM [facility] [phone] Upcoming Health Maintenance Date Due Pneumococcal 19-64 Medium Risk (1 of 1 - PPSV23) 1996 DTaP/Tdap/Td series (1 - Tdap) 1998 PAP AKA CERVICAL CYTOLOGY 1998 Influenza Age 5 to Adult 2017 Allergies as of 2018  Review Complete On: 2018 By: Marion Roblero PA-C Severity Noted Reaction Type Reactions Augmentin [Amoxicillin-pot Clavulanate] High 2013    Anaphylaxis Amoxicillin  2013   Topical Hives, Itching Itching of throat Ciprofloxacin  2013   Topical Other (comments) Redness of arm above IV site Darvocet A500 [Propoxyphene N-acetaminophen]  2013   Topical Hives Macrobid [Nitrofurantoin Monohyd/m-cryst]  2013   Side Effect Hives Nubain [Nalbuphine]  2013   Topical Hives, Other (comments) Muscle spasms Phenergan [Promethazine]  2016    Other (comments) Makes me feel like Im on fire from the inside out and causes involuntary muscle spasms. Toradol [Ketorolac Tromethamine]  2016    Itching Vibramycin [Doxycycline Calcium]  2013   Topical Hives, Itching Itching of the throat Current Immunizations  Never Reviewed No immunizations on file. Not reviewed this visit You Were Diagnosed With   
  
 Codes Comments Pain in joint, multiple sites    -  Primary ICD-10-CM: M25.50 ICD-9-CM: 719.49 Fibromyalgia     ICD-10-CM: M79.7 ICD-9-CM: 729.1 Encounter for long-term (current) use of high-risk medication     ICD-10-CM: Z79.899 ICD-9-CM: V58.69 Chronic pain syndrome     ICD-10-CM: G89.4 ICD-9-CM: 338.4 Generalized abdominal pain     ICD-10-CM: R10.84 ICD-9-CM: 789.07 Vitals BP Pulse Temp Resp Height(growth percentile) Weight(growth percentile) 121/85 (BP 1 Location: Right arm, BP Patient Position: Sitting) (!) 126 98.6 °F (37 °C) (Oral) 18 5' 1\" (1.549 m) 160 lb (72.6 kg) BMI OB Status Smoking Status 30.23 kg/m2 Hysterectomy Former Smoker BMI and BSA Data Body Mass Index Body Surface Area  
 30.23 kg/m 2 1.77 m 2 Preferred Pharmacy Pharmacy Name Phone [address] [facility]. [address] [place] [address] [phone] Your Updated Medication List  
  
   
This list is accurate as of 4/6/18 10:37 AM.  Always use your most recent med list.  
  
  
  
  
 * baclofen 10 mg tablet Commonly known as:  LIORESAL Take 1/2 to 1 tab po up to three times per day for muscle spasms * baclofen 10 mg tablet Commonly known as:  LIORESAL Take 1/2 to 1 tab po up to three times per day for muscle spasms Start taking on:  5/17/2018 CeleBREX 50 mg capsule Generic drug:  Celecoxib Take 50 mg by mouth two (2) times a day. doxepin 25 mg capsule Commonly known as:  SINEquan Take 1 Cap by mouth nightly. For chronic insomnia * DULoxetine 60 mg capsule Commonly known as:  CYMBALTA Take 1 Cap by mouth daily. * DULoxetine 60 mg capsule Commonly known as:  CYMBALTA Take 1 Cap by mouth daily. Start taking on:  4/18/2018 EPINEPHrine 0.3 mg/0.3 mL (1:1,000) injection Commonly known as:  AUVI-Q  
0.3 mL by IntraMUSCular route once as needed for Other (severe allergic reaction ) for 1 dose. * gabapentin 600 mg tablet Commonly known as:  NEURONTIN Take 1 Tab by mouth three (3) times daily. For neuropathic pain * gabapentin 300 mg capsule Commonly known as:  NEURONTIN Take 1 Cap by mouth three (3) times daily for 30 days. Start taking on:  5/17/2018 * HYDROmorphone 8 mg tablet Commonly known as:  DILAUDID Take 1 Tab by mouth three (3) times daily as needed for Pain for up to 30 days. Max Daily Amount: 24 mg. For breakthrough pain. Indications: Pain  
  
 * HYDROmorphone 8 mg tablet Commonly known as:  DILAUDID Take 1 Tab by mouth three (3) times daily as needed for Pain for up to 30 days. Max Daily Amount: 24 mg. For breakthrough pain. Indications: Pain Start taking on:  4/18/2018  
  
 * HYDROmorphone 8 mg tablet Commonly known as:  DILAUDID Take 1 Tab by mouth three (3) times daily as needed for Pain for up to 30 days. Max Daily Amount: 24 mg. For breakthrough pain. Indications: Pain Start taking on:  5/17/2018  
  
 * HYDROmorphone 8 mg tablet Commonly known as:  DILAUDID Take 1 Tab by mouth three (3) times daily as needed for Pain for up to 30 days. Max Daily Amount: 24 mg. For breakthrough pain. Indications: Pain Start taking on:  6/16/2018 * methadone 10 mg tablet Commonly known as:  DOLOPHINE Take 1.5 Tabs by mouth three (3) times daily for 30 days. Max Daily Amount: 45 mg. Indications: Chronic Pain, Severe Pain * methadone 10 mg tablet Commonly known as:  DOLOPHINE Take 1.5 Tabs by mouth three (3) times daily for 30 days. Max Daily Amount: 45 mg. Indications: Chronic Pain, Severe Pain Start taking on:  4/18/2018 * methadone 10 mg tablet Commonly known as:  DOLOPHINE Take 1.5 Tabs by mouth three (3) times daily for 30 days. Max Daily Amount: 45 mg. Indications: Chronic Pain, Severe Pain Start taking on:  5/17/2018 * methadone 10 mg tablet Commonly known as:  DOLOPHINE Take 1.5 Tabs by mouth three (3) times daily for 30 days. Max Daily Amount: 45 mg. Indications: Chronic Pain, Severe Pain Start taking on:  6/16/2018 NARCAN IJ  
by Injection route. ondansetron 4 mg disintegrating tablet Commonly known as:  ZOFRAN ODT  
 Take 1-2 tablets every 6-8 hours as needed for nausea and vomiting. * varenicline 0.5 mg (11)- 1 mg (42) Dspk Commonly known as:  Chantix Take 1 mg by mouth two (2) times daily (after meals). Indications: SMOKING CESSATION  
  
 * varenicline 1 mg tablet Commonly known as:  Krystal Sidle Take 1 Tab by mouth two (2) times a day. Take with meals * Notice: This list has 16 medication(s) that are the same as other medications prescribed for you. Read the directions carefully, and ask your doctor or other care provider to review them with you. Prescriptions Printed Refills HYDROmorphone (DILAUDID) 8 mg tablet 0 Starting on: 6/16/2018 Sig: Take 1 Tab by mouth three (3) times daily as needed for Pain for up to 30 days. Max Daily Amount: 24 mg. For breakthrough pain. Indications: Pain Class: Print Route: Oral  
 HYDROmorphone (DILAUDID) 8 mg tablet 0 Starting on: 5/17/2018 Sig: Take 1 Tab by mouth three (3) times daily as needed for Pain for up to 30 days. Max Daily Amount: 24 mg. For breakthrough pain. Indications: Pain Class: Print Route: Oral  
 HYDROmorphone (DILAUDID) 8 mg tablet 0 Starting on: 4/18/2018 Sig: Take 1 Tab by mouth three (3) times daily as needed for Pain for up to 30 days. Max Daily Amount: 24 mg. For breakthrough pain. Indications: Pain Class: Print Route: Oral  
 methadone (DOLOPHINE) 10 mg tablet 0 Starting on: 6/16/2018 Sig: Take 1.5 Tabs by mouth three (3) times daily for 30 days. Max Daily Amount: 45 mg. Indications: Chronic Pain, Severe Pain Class: Print Route: Oral  
 methadone (DOLOPHINE) 10 mg tablet 0 Starting on: 5/17/2018 Sig: Take 1.5 Tabs by mouth three (3) times daily for 30 days. Max Daily Amount: 45 mg. Indications: Chronic Pain, Severe Pain Class: Print Route: Oral  
 methadone (DOLOPHINE) 10 mg tablet 0 Starting on: 4/18/2018 Sig: Take 1.5 Tabs by mouth three (3) times daily for 30 days. Max Daily Amount: 45 mg. Indications: Chronic Pain, Severe Pain Class: Print Route: Oral  
  
Prescriptions Sent to Pharmacy Refills  
 gabapentin (NEURONTIN) 300 mg capsule 2 Starting on: 5/17/2018 Sig: Take 1 Cap by mouth three (3) times daily for 30 days. Class: Normal  
 [facility] [address]  Ph #: [phone] Route: Oral  
 DULoxetine (CYMBALTA) 60 mg capsule 2 Starting on: 4/18/2018 Sig: Take 1 Cap by mouth daily. Class: Normal  
 [facility]acy: [facility] [address]  Ph #: [phone] Route: Oral  
 baclofen (LIORESAL) 10 mg tablet 2 Starting on: 5/17/2018 Sig: Take 1/2 to 1 tab po up to three times per day for muscle spasms Class: Normal  
 [facility] [address]  Ph #: [phone] We Performed the Following AMB POC DRUG SCREEN () [ [facility]] DRUG SCREEN [[id number] Custom] Introducing [facility]! Dear Ajay Stevens: Thank you for requesting a g2One account. Our records indicate that you already have an active g2One account. You can access your account anytime at [email or web address] Did you know that you can access your hospital and ER discharge instructions at any time in g2One? You can also review all of your test results from your hospital stay or ER visit. Additional Information If you have questions, please visit the Frequently Asked Questions section of the g2One website at [email or web address] Remember, g2One is NOT to be used for urgent needs. For medical emergencies, dial 911. Now available from your iPhone and Android! Please provide this summary of care documentation to your next provider. Your primary care clinician is listed as Raudel Daugherty.  If you have any questions after today's visit, please call [phone]. 75

## 2022-03-20 PROBLEM — R10.84 GENERALIZED ABDOMINAL PAIN: Status: ACTIVE | Noted: 2017-10-12

## 2022-09-19 ENCOUNTER — APPOINTMENT (OUTPATIENT)
Dept: GENERAL RADIOLOGY | Age: 47
End: 2022-09-19
Attending: PHYSICIAN ASSISTANT
Payer: MEDICAID

## 2022-09-19 ENCOUNTER — HOSPITAL ENCOUNTER (EMERGENCY)
Age: 47
Discharge: HOME | End: 2022-09-19
Attending: EMERGENCY MEDICINE
Payer: MEDICAID

## 2022-09-19 VITALS
HEIGHT: 61 IN | BODY MASS INDEX: 25.68 KG/M2 | DIASTOLIC BLOOD PRESSURE: 68 MMHG | HEART RATE: 83 BPM | RESPIRATION RATE: 20 BRPM | TEMPERATURE: 98.5 F | OXYGEN SATURATION: 94 % | WEIGHT: 136 LBS | SYSTOLIC BLOOD PRESSURE: 118 MMHG

## 2022-09-19 DIAGNOSIS — E86.0 DEHYDRATION: Primary | ICD-10-CM

## 2022-09-19 DIAGNOSIS — I95.9 HYPOTENSIVE EPISODE: ICD-10-CM

## 2022-09-19 DIAGNOSIS — R42 LIGHT HEADED: ICD-10-CM

## 2022-09-19 LAB
ALBUMIN SERPL-MCNC: 4.2 G/DL (ref 3.4–5)
ALBUMIN/GLOB SERPL: 1.1 {RATIO} (ref 0.8–1.7)
ALP SERPL-CCNC: 82 U/L (ref 45–117)
ALT SERPL-CCNC: 21 U/L (ref 13–56)
ANION GAP SERPL CALC-SCNC: 7 MMOL/L (ref 3–18)
APPEARANCE UR: CLEAR
AST SERPL-CCNC: 17 U/L (ref 10–38)
BACTERIA URNS QL MICRO: NEGATIVE /HPF
BASOPHILS # BLD: 0 K/UL (ref 0–0.1)
BASOPHILS NFR BLD: 0 % (ref 0–2)
BILIRUB SERPL-MCNC: 0.3 MG/DL (ref 0.2–1)
BILIRUB UR QL: ABNORMAL
BUN SERPL-MCNC: 16 MG/DL (ref 7–18)
BUN/CREAT SERPL: 15 (ref 12–20)
CALCIUM SERPL-MCNC: 9.6 MG/DL (ref 8.5–10.1)
CHLORIDE SERPL-SCNC: 104 MMOL/L (ref 100–111)
CO2 SERPL-SCNC: 26 MMOL/L (ref 21–32)
COLOR UR: ABNORMAL
CREAT SERPL-MCNC: 1.06 MG/DL (ref 0.6–1.3)
D DIMER PPP FEU-MCNC: 0.28 UG/ML(FEU)
DIFFERENTIAL METHOD BLD: ABNORMAL
EOSINOPHIL # BLD: 0.3 K/UL (ref 0–0.4)
EOSINOPHIL NFR BLD: 3 % (ref 0–5)
EPITH CASTS URNS QL MICRO: NORMAL /LPF (ref 0–5)
ERYTHROCYTE [DISTWIDTH] IN BLOOD BY AUTOMATED COUNT: 13.4 % (ref 11.6–14.5)
GLOBULIN SER CALC-MCNC: 3.7 G/DL (ref 2–4)
GLUCOSE SERPL-MCNC: 104 MG/DL (ref 74–99)
GLUCOSE UR STRIP.AUTO-MCNC: NEGATIVE MG/DL
HCT VFR BLD AUTO: 33.9 % (ref 35–45)
HGB BLD-MCNC: 10.7 G/DL (ref 12–16)
HGB UR QL STRIP: NEGATIVE
IMM GRANULOCYTES # BLD AUTO: 0 K/UL
IMM GRANULOCYTES NFR BLD AUTO: 0 %
KETONES UR QL STRIP.AUTO: NEGATIVE MG/DL
LACTATE BLD-SCNC: 0.96 MMOL/L (ref 0.4–2)
LEUKOCYTE ESTERASE UR QL STRIP.AUTO: ABNORMAL
LYMPHOCYTES # BLD: 5.8 K/UL (ref 0.9–3.6)
LYMPHOCYTES NFR BLD: 55 % (ref 21–52)
MCH RBC QN AUTO: 29.7 PG (ref 24–34)
MCHC RBC AUTO-ENTMCNC: 31.6 G/DL (ref 31–37)
MCV RBC AUTO: 94.2 FL (ref 78–100)
MONOCYTES # BLD: 0.4 K/UL (ref 0.05–1.2)
MONOCYTES NFR BLD: 4 % (ref 3–10)
NEUTS SEG # BLD: 4 K/UL (ref 1.8–8)
NEUTS SEG NFR BLD: 38 % (ref 40–73)
NITRITE UR QL STRIP.AUTO: POSITIVE
NRBC # BLD: 0 K/UL (ref 0–0.01)
NRBC BLD-RTO: 0 PER 100 WBC
PH UR STRIP: 5.5 [PH] (ref 5–8)
PLATELET # BLD AUTO: 407 K/UL (ref 135–420)
PMV BLD AUTO: 10.1 FL (ref 9.2–11.8)
POTASSIUM SERPL-SCNC: 3.7 MMOL/L (ref 3.5–5.5)
PROT SERPL-MCNC: 7.9 G/DL (ref 6.4–8.2)
PROT UR STRIP-MCNC: ABNORMAL MG/DL
RBC # BLD AUTO: 3.6 M/UL (ref 4.2–5.3)
RBC #/AREA URNS HPF: NORMAL /HPF (ref 0–5)
RBC MORPH BLD: ABNORMAL
RBC MORPH BLD: ABNORMAL
SODIUM SERPL-SCNC: 137 MMOL/L (ref 136–145)
SP GR UR REFRACTOMETRY: 1.03 (ref 1–1.03)
TROPONIN-HIGH SENSITIVITY: <3 NG/L (ref 0–54)
UROBILINOGEN UR QL STRIP.AUTO: 1 EU/DL (ref 0.2–1)
WBC # BLD AUTO: 10.5 K/UL (ref 4.6–13.2)
WBC MORPH BLD: ABNORMAL
WBC URNS QL MICRO: NORMAL /HPF (ref 0–5)

## 2022-09-19 PROCEDURE — 71046 X-RAY EXAM CHEST 2 VIEWS: CPT

## 2022-09-19 PROCEDURE — 96361 HYDRATE IV INFUSION ADD-ON: CPT

## 2022-09-19 PROCEDURE — 81001 URINALYSIS AUTO W/SCOPE: CPT

## 2022-09-19 PROCEDURE — 93005 ELECTROCARDIOGRAM TRACING: CPT

## 2022-09-19 PROCEDURE — 85025 COMPLETE CBC W/AUTO DIFF WBC: CPT

## 2022-09-19 PROCEDURE — 85379 FIBRIN DEGRADATION QUANT: CPT

## 2022-09-19 PROCEDURE — 74011250636 HC RX REV CODE- 250/636: Performed by: PHYSICIAN ASSISTANT

## 2022-09-19 PROCEDURE — 99285 EMERGENCY DEPT VISIT HI MDM: CPT

## 2022-09-19 PROCEDURE — 84484 ASSAY OF TROPONIN QUANT: CPT

## 2022-09-19 PROCEDURE — 83605 ASSAY OF LACTIC ACID: CPT

## 2022-09-19 PROCEDURE — 96360 HYDRATION IV INFUSION INIT: CPT

## 2022-09-19 PROCEDURE — 80053 COMPREHEN METABOLIC PANEL: CPT

## 2022-09-19 RX ORDER — AMITRIPTYLINE HYDROCHLORIDE 25 MG/1
TABLET, FILM COATED ORAL
COMMUNITY

## 2022-09-19 RX ORDER — HYDROMORPHONE HYDROCHLORIDE 8 MG/1
8 TABLET ORAL
COMMUNITY

## 2022-09-19 RX ORDER — BUPRENORPHINE HYDROCHLORIDE 150 UG/1
FILM, SOLUBLE BUCCAL
COMMUNITY

## 2022-09-19 RX ORDER — ALENDRONATE SODIUM 70 MG/1
TABLET ORAL
COMMUNITY

## 2022-09-19 RX ADMIN — SODIUM CHLORIDE 1000 ML: 9 INJECTION, SOLUTION INTRAVENOUS at 17:33

## 2022-09-19 RX ADMIN — SODIUM CHLORIDE 1000 ML: 9 INJECTION, SOLUTION INTRAVENOUS at 19:22

## 2022-09-19 NOTE — ED TRIAGE NOTES
Ambulatory patient arrives with complaints of being lightheaded and dizzy since about noon, reports low O2 at home, Nad in triage

## 2022-09-19 NOTE — ED NOTES
Patients map below 65 provider aware. Juliette CHAPA reported to bedside at this time fluids placed or pressure bag. Cardiac monitor applied.

## 2022-09-19 NOTE — ED NOTES
CARDIOLOGY CONSULT    Requesting MD:  Dr. Patricia    Reason for consult:  Chest pain/ L arm pain    HPI:    84-year-old male with a history of hypertension, hyperlipidemia and coronary artery disease status post prior bypass grafting of LIMA to LAD, SVG to diagonal, SVG to OM and known occluded SVG to RPDA. He has also had multiple stents placed in past, most recently 12/2016 with Xience 4x15mm drug eluting stent to ostial RCA and POBA to distal RCA and PDA.     Yesterday he presented to the ED with c/o L arm pain while on the treadmill at the gym as well as recurrent arm pain with exertion while walking his dogs.  Overall he has been c/o not feeling well per his daughter.  Recently his Imdur was increased b/o c/o arm pain on treadmill and symptoms did not improve.  Initial troponin was normal followed by 0.2 and 0.24.  He is pain free now.      Past Medical History:   Diagnosis Date   • Anxiety    • Arthritis     all over   • BPH (benign prostatic hypertrophy)    • CAD (coronary artery disease)     CABG 1995   • HTN (hypertension)    • Hyperlipidemia    • Myocardial infarction    • Skin cancer 1/2016    right forearm and chest   • Urinary tract infection      Past Surgical History:   Procedure Laterality Date   • CARDIAC CATHERIZATION  1997    Angioplasty   • CARDIAC CATHETERIZATION/POSSIBLE PTCA/POSSIBLE STENT  1/282015    4 stents   • CARDIAC SURGERY  1995    CABG X4   • COLON SURGERY  02/2015    bowel resection with hernia repair   • HERNIA REPAIR      Left x2   • INCISION AND DRAINAGE  12/16/2014    Abdominal wall abcess  In office   • JOINT REPLACEMENT  2/2010    Rt. THR   • JOINT REPLACEMENT  2006    Left tHR   • JOINT REPLACEMENT  2012    Right TKR   • PTCA      stentsx2 in 2007 & 2008   • SERVICE TO GASTROENTEROLOGY  7/2010    Colonoscopy   • TENDON REPAIR Left 2010    Arm   • TONSILLECTOMY AND ADENOIDECTOMY      as child   • VENTRAL HERNIA REPAIR  02/05/2015    with small bowel resection     Prior to  Patient escorted to rest room by RN. Patient able to ambulate independently and without assistants.  Denies Dizziness     Patient was unable to urinate at this time     Provider aware Admission medications    Medication Sig Start Date End Date Taking? Authorizing Provider   DILT- MG 24 hr capsule TAKE 1 CAPSULE BY MOUTH EVERY DAY 10/23/17  Yes Lexi Chaudhry MD   isosorbide mononitrate (IMDUR) 60 MG 24 hr tablet Take 1 tablet by mouth daily. 10/19/17  Yes Fidel Avalos MD   rosuvastatin (CRESTOR) 5 MG tablet TAKE ONE TABLET BY MOUTH EVERY DAY 10/4/17  Yes Lexi Chaudhry MD   lisinopril (PRINIVIL,ZESTRIL) 40 MG tablet TAKE ONE TABLET BY MOUTH EVERY DAY 8/28/17  Yes Lexi Chaudhry MD   triamterene-hydrochlorothiazide (DYAZIDE) 37.5-25 MG per capsule TAKE ONE CAPSULE BY MOUTH EVERY DAY 6/2/17  Yes Lexi Chaudhry MD   clopidogrel (PLAVIX) 75 MG tablet Take 1 tablet by mouth daily. 5/1/17  Yes Fidel Avalos MD   aspirin 81 MG EC tablet Take 1 tablet by mouth daily. 1/29/15  Yes Robert Perkins MD   Ascorbic Acid (VITAMIN C) 1000 MG tablet Take 1,000 mg by mouth daily. 12/19/14  Yes Provider Not In System   Omega-3 Fatty Acids (FISH OIL) 1000 MG capsule Take 1 g by mouth every morning.    Yes Fidel Avalos MD   nitroGLYcerin (NITROSTAT) 0.4 MG SL tablet Place 1 tablet under the tongue every 5 minutes as needed for Chest pain. 6/20/14   Fidel Avalos MD     Current medications:  • influenza virus quadrivalent vaccine inactivated (PRESERVATIVE FREE)  0.5 mL Intramuscular Once   • ascorbic acid  1,000 mg Oral Daily   • aspirin  81 mg Oral Daily   • clopidogrel  75 mg Oral Daily   • dilTIAZem  240 mg Oral Daily   • isosorbide mononitrate  60 mg Oral Daily   • lisinopril  40 mg Oral Daily   • omega-3 acid ethyl esters  1 g Oral Daily   • triamterene-hydroCHLOROthiazide  1 tablet Oral Daily   • sodium chloride (PF)  2 mL Injection 2 times per day   • rosuvastatin  5 mg Oral Nightly     • heparin (porcine) 98416 units/500 mL dextrose 5% standard infusion 11 Units/kg/hr (10/24/17 1019)       ALLERGIES:   Allergen Reactions   • Bextra [Valdecoxib] HIVES and RASH   •  Ciprofloxacin Other (See Comments)     \"I tore tendons in my shoulder\"   • Statins Other (See Comments)     Leg cramps.       Social History   Substance Use Topics   • Smoking status: Former Smoker     Quit date: 6/11/1960   • Smokeless tobacco: Never Used   • Alcohol use No     Family History   Problem Relation Age of Onset   • Stroke Daughter    • Heart disease Father    • Substance abuse Father    • Heart disease Son      Heart Valve repair   • Clotting Disorder Son      P.E.   • Heart disease Son    • Mental illness Brother      lewy body dementia   • Kidney disease Paternal Grandfather    • Cancer Other        Review of Systems:  Pertinent items are noted in history of present illness  All systems reviewed and negative except for those mentioned in the history of present illness    Objective:    Vitals:    10/24/17 1015   BP: 155/90   Pulse: 74   Resp:    Temp:      Exam:  Head:  Normocephalic, without obvious abnormality, atraumatic   Eyes:  Pupils equal, round, reactive to light both eyes   Ears:  Normal external ear canals   Nose: Nares normal, septum midliness   Throat: Lips, mucosa, and tongue normal   Neck: Supple, symmetrical, trachea midline, no adenopathy;   no carotid bruit, no jugular venous distention   Back:  Symmetric, no curvature   Lungs:  Clear to auscultation, respirations unlabored   Chest Wall:  No tenderness or deformity   Heart:  Regular rate and rhythm, S1 and S2 normal, no rub or gallop   Abdomen:  Soft, non-tender, bowel sounds active all four quadrants,   no masses, no organomegaly   Extremities: No edema   Pulses: 2+ and symmetric all extremities   Skin: Skin color, texture, turgor normal, no rashes or lesions   Neurologic: No gross deficits               Recent Labs  Lab 10/24/17  0631 10/24/17  0045 10/23/17  1815   WBC  --   --  6.8   HCT  --   --  48.2   HGB  --   --  17.2*   PLT  --   --  207   INR  --   --  0.9   PTT  --   --  26   SODIUM  --   --  139   POTASSIUM  --   --  4.0    CHLORIDE  --   --  103   CO2  --   --  27   GLUCOSE  --   --  94   BUN  --   --  30*   CREATININE 1.13  --  1.27*   RAPDTR 0.24* 0.20*  --    BNP  --   --  49     CHOLESTEROL (mg/dL)   Date Value   07/11/2017 152     HDL (mg/dL)   Date Value   07/11/2017 42     CHOL/HDL (no units)   Date Value   07/11/2017 3.6     TRIGLYCERIDE (mg/dL)   Date Value   07/11/2017 94     CALCULATED LDL (mg/dL)   Date Value   07/11/2017 91       Cath 12/2016:  Dominance: Right   Left Main   Known severe left system disease.   Left Anterior Descending   Known severe left system disease.   Left Circumflex   Known severe left system disease.   Right Coronary Artery   Ost RCA lesion, 90% stenosed.   PCI: Pre-intervention ROYA flow: 2. Post intervention ROYA flow: 3. Pre-stent angioplasty was performed. A 4 x 15 drug eluting stent was placed. The strut is apposed. Post-stent angioplasty was performed. The intervention was successful.     Complications: No complications occured at this lesion.   Supplies used: INTRODUCER SHTH 6FR 50CM 11CM GRN HUB SNPFT DIL; CATHETER GUIDE AL COR 1 CRV 6FR .07IN 100CM VISTA; GUIDEWIRE HTORQ WHISPER MS 190CM J .014IN VASC SS; STENT XIENCE ALPINE 4MM 15MM RX RADOPQ 1 ACC PORT; CATHETER BLN TREK 2.5MM 15MM RX 2 LUM RADOPQ; CATHETER BLN NC TREK 4MM 15MM RX 2 MRKR ML LR RND   There is no residual stenosis post intervention.   Dist RCA lesion, 90% stenosed.   PCI: Angioplasty alone was performed. A stent was not placed.     Complications: No complications occured at this lesion.   Supplies used: CATHETER GUIDE AL COR 1 CRV 6FR .07IN 100CM VISTA; CATHETER BLN NC TREK 3MM 15MM RX 2 MRKR ML LR RND; GUIDEWIRE HTORQ WHISPER MS 190CM J .014IN VASC SS   There is a 20% residual stenosis post intervention.   Right Posterior Descending Artery   RPDA lesion, 90% stenosed.   PCI: Angioplasty alone was performed. A stent was not placed.     Complications: No complications occured at this lesion.   Supplies used: CATHETER GUIDE  AL COR 1 CRV 6FR .07IN 100CM VISTA; GUIDEWIRE ASAHI FIELDER XT 190CM STRGT .014IN VASC; CATHETER BLN MINI TREK 1.5MM 15MM RX 2 LUM; CATHETER BLN MINI TREK 2MM 15MM RX 2 LUM; CATHETER BLN NC TREK 3MM 15MM RX 2 MRKR ML LR RND; CATHETER BLN TREK 2.5MM 15MM RX 2 LUM RADOPQ   There is a 20% residual stenosis post intervention.   Graft Angiography   LIMA Graft to Mid LAD   LIMA to LAD open.   Graft to 1st Diag   SVG to Diagonal open with previously placed stent.   Graft to 1st Mrg   SVG to OM open with previously placed stent.   Graft to RPDA   Known to be occluded, not engaged this study.     Impression:  -PCI to the ostial RCA with a Xience Alpine 4mm x 15 mm drug eluting stent.  -Balloon angioplasty of the distal RCA  -Balloon angioplasty of the PDA     -Patent LIMA to LAD, SVG to Diagonal, SVG to OM grafts. SVG to RPDA previously known to be occluded.  -Previously known severe native left coronary system disease.    Imaging and other studies:      EKG:  SR with no acute changes, 1st degree AVB    Assessment/Plan:       NSTEMI/coronary artery disease  Status post coronary artery bypass grafting in 1997 of LIMA to LAD, SVG to diagonal, SVG to OM and known occluded SVG to RPDA.  Multiple stents placed in past, most recently 12/2016 with Xience 4x15mm drug eluting stent to ostial RCA and POBA to distal RCA and PDA. Currently remains asymptomatic but has been having recurrent L arm pain with exertion despite optimizing medical mgt.  Will plan for cath tomorrow to assess stent and graft patency.  Continue ASA, Plavix, statin, NTG and heparin gtt.     Hypertension  Monitor on current regime.  On ACE-I, diuretics, nitrate.     Dyslipidemia  Lipids well controlled on labs 7/2017.      Thank you for the consult.  Await cath.  Questions answered for pt and daughter.    Joanne Plummer NP

## 2022-09-19 NOTE — ED PROVIDER NOTES
EMERGENCY DEPARTMENT HISTORY & PHYSICAL EXAM    [facility] EMERGENCY DEPT  9/19/2022, 5:28 PM    Clinical Impression:  1. Dehydration    2. Hypotensive episode    3. Light headed        Assessment/Differential Diagnosis:     Ddx electrolyte abnormality, dehydration, ACS, infectious process, lung disease all considered    ED Course:   Initial assessment performed. The patients presenting problems have been discussed, and they are in agreement with the care plan formulated and outlined with them. I have encouraged them to ask questions as they arise throughout their visit. Pt with feeling \"off\" today. Light headed with change In position, no syncope. Checked Sa02 at home with pulse ox reading 84% at home, so sig SOB at the time. PCP recommend she come to the ED for evaluation. Exam with pulse ox 97%, . Pt appears anxious. Ambulates without difficulty. Exam with pt jittery, but no acute findings. Home pulse ox checked at same time, 02sat here 97%, her pulse ox machine 90%  Will check labs, EKG, CXR and give IVF. EKG NSR, 86, normal axis, nonspecific twave changes. Workup with CXR nad, labs with no concerning acute findings. Pt did have a low BP systolic 22/ , after ambulating returning from xray. Pt give IL NS. Orthostatics after 1L with no sig change with change in position. Asymptomatic. Will give 2L awaiting pt to produce urine  7:45pm  Pt resting comfortably, feels much better. Lungs CTAB, CV RR&R, no murmur. Abd soft/nontender. /. Plan to have pt to finish fluids and d/c home    8:06 PM  Most recent BP 93/41, repeat 118/68. Discussed with Dr. Cheli Jones. Marcela Frye for discharge given repeat BP ok. Discussed with pt and son. Agreeable with discharge. Medical Chart Review:  I have reviewed triage nursing documentation. Review of old medical records with the following pertinent information:       Disposition:  Home  in good condition.       Chief Complaint Patient presents with    Dizziness     HPI:    The history is provided by patient. No  used. Timmy Jaime is a 41 y.o. female presenting to the Emergency Department with complaints of low oxygen reading at home. Patient states that she has intermittent episodes of feeling short of breath and fatigued and lightheaded. She initially said she was dizzy but with further discussion it is more of a lightheaded feeling with sitting and moving around. She has history of elevated heart rate, takes medication for it, so checked the pulse ox often to ensure her heart rate is within normal range. While doing that she noticed her pulse ox was in the mid 80s. She was not feeling significantly short of breath at that time. She did call her PCP soon who recommended she come to the ED for evaluation. There is been no fever, chills or flulike illness. She denies headache. There is been no cough or wheeze. She endorses generalized shortness of breath for several months. When asked if she is having chest pain she states \"I think so\" but with no pain at this moment. There is been no diaphoresis, nausea, new abdominal pain, vomiting, diarrhea. Patient states she always has some degree of dysuria due to interstitial cystitis with no significant change. No history of DVT or PE. No history of anticoagulant use. Patient has history of fibromyalgia, chronic pain, interstitial cystitis, episodic nausea and vomiting, kidney stones. She is on multiple medications. She does see chronic pain management and is on no medication daily. .      I have reviewed all PMHX, FMHX and Social Hx as entered into the medical record in the chart below using the Epic Template. Review of Systems:  Constitutional: neg for fever, chills  ENT:  neg for URI symptoms  Respiratory:  neg for cough, positive for shortness of breath  Cardiovascular:  +/- for chest pain. Neg for pedal/LE edema.    GI:  neg for acute abdominal pain. No v/d.  :  No urinary symptoms. No Flank pain. MSK: neg for back pain. Integumentary: no rashes, or skin trauma  Neurological: neg for headaches  All other systems reviewed negative with exception of positives in ROS and HPI. Past Medical History:  Past Medical History:   Diagnosis Date    Anxiety     Arthritis     osteopenia    Chronic kidney disease     kidney stones    Chronic pain     bladder    Depression     Fibromyalgia     Interstitial cystitis     Interstitial cystitis     Nausea & vomiting     Osteoporosis     Other ill-defined conditions(799.89)     chronic interstitual cyctitis    Other ill-defined conditions(799.89)     endometriosis    Psychiatric disorder        Past Surgical History:  Past Surgical History:   Procedure Laterality Date    HX APPENDECTOMY      HX  SECTION      HX HEENT      oral surgery wisdom teeth extractiion    HX HYSTERECTOMY      LAVH    HX PELVIC LAPAROSCOPY      x 5    HX UROLOGICAL      cysto and hydrodistention x 4    HX UROLOGICAL      Interstim implant    HX UROLOGICAL  2013    revision interstim       Family History:  Family History   Problem Relation Age of Onset    Post-op Nausea/Vomiting Sister     Malignant Hyperthermia Neg Hx     Pseudocholinesterase Deficiency Neg Hx     Delayed Awakening Neg Hx     Emergence Delirium Neg Hx     Post-op Cognitive Dysfunction Neg Hx        Social History:  Social History     Tobacco Use    Smoking status: Former     Packs/day: 0.50     Years: 10.00     Pack years: 5.00     Types: Cigarettes    Smokeless tobacco: Never   Substance Use Topics    Alcohol use: Yes     Comment: 1 every 2 months    Drug use: No       Allergies:   Allergies   Allergen Reactions    Augmentin [Amoxicillin-Pot Clavulanate] Anaphylaxis    Amoxicillin Hives and Itching     Itching of throat    Ciprofloxacin Other (comments) and Itching     Redness of arm above IV site    Darvocet A500 [Propoxyphene N-Acetaminophen] Hives Doxycycline Unknown (comments)    Ketorolac Tromethamine Itching and Hives    Macrobid [Nitrofurantoin Monohyd/M-Cryst] Hives    Morphine Unknown (comments)     Pt states she's had morphine, she's not allergic    Nalbuphine Hives and Other (comments)     Muscle spasms    Phenergan [Promethazine] Other (comments)     Makes me feel like Im on fire from the inside out and causes involuntary muscle spasms. Vibramycin [Doxycycline Calcium] Hives and Itching     Itching of the throat       Vital Signs:  Vitals:    09/19/22 1824 09/19/22 1856 09/19/22 1932 09/19/22 2006   BP: 113/80 100/61 109/74 118/68   Pulse: 97 79 83    Resp: 12 14 20    Temp:       SpO2:       Weight:       Height:         Physical Exam:  Vital Signs Reviewed. Nursing Notes Reviewed. Constitutional:  Well developed, well nourished patient. Pt appears anxious, jittery. Vitals with , 02 sat 94%  Head: Normocephalic, Atraumatic  Eyes: Conjunctiva clear, lids normal. Sclera anicteric. ENT:hearing grossly intact, throat clear   Neck:  supple, FROM, no meningismus, nontender  Lungs: No respiratory distress. Lungs CTAB . cough. no pain with inspiration  CV:  RR&R without murmur  Thorax: no chest wall tenderness. No signs of trauma. Skin without rash. Abdomen: + slight tenderness with palpation. All 4 quadrants. BS normal throughout. No organomegaly appreciated. No mass. Extremities:  no tenderness with palpation to UE or LE. No pedal edema. Normal cap refill, bilat equal.   Neuro:  A&O x 3. CN II-XII grossly intact. No gross neuro deficits. Skin:  Warm, dry, no rash. Skin intact.      Diagnostics:    Labs -     Recent Results (from the past 12 hour(s))   EKG, 12 LEAD, INITIAL    Collection Time: 09/19/22  5:16 PM   Result Value Ref Range    Ventricular Rate 89 BPM    Atrial Rate 89 BPM    P-R Interval 110 ms    QRS Duration 64 ms    Q-T Interval 366 ms    QTC Calculation (Bezet) 445 ms    Calculated P Axis 53 degrees    Calculated R Axis 36 degrees    Calculated T Axis 78 degrees    Diagnosis       Sinus rhythm with short WA  ST & T wave abnormality, consider anterior ischemia  Abnormal ECG  When compared with ECG of 12-JUL-2020 10:09,  ST now depressed in Anterior leads  Inverted T waves have replaced nonspecific T wave abnormality in Anterior   leads     METABOLIC PANEL, COMPREHENSIVE    Collection Time: 09/19/22  5:30 PM   Result Value Ref Range    Sodium 137 136 - 145 mmol/L    Potassium 3.7 3.5 - 5.5 mmol/L    Chloride 104 100 - 111 mmol/L    CO2 26 21 - 32 mmol/L    Anion gap 7 3.0 - 18 mmol/L    Glucose 104 (H) 74 - 99 mg/dL    BUN 16 7.0 - 18 MG/DL    Creatinine 1.06 0.6 - 1.3 MG/DL    BUN/Creatinine ratio 15 12 - 20      GFR est AA >60 >60 ml/min/1.73m2    GFR est non-AA 56 (L) >60 ml/min/1.73m2    Calcium 9.6 8.5 - 10.1 MG/DL    Bilirubin, total 0.3 0.2 - 1.0 MG/DL    ALT (SGPT) 21 13 - 56 U/L    AST (SGOT) 17 10 - 38 U/L    Alk. phosphatase 82 45 - 117 U/L    Protein, total 7.9 6.4 - 8.2 g/dL    Albumin 4.2 3.4 - 5.0 g/dL    Globulin 3.7 2.0 - 4.0 g/dL    A-G Ratio 1.1 0.8 - 1.7     TROPONIN-HIGH SENSITIVITY    Collection Time: 09/19/22  5:30 PM   Result Value Ref Range    Troponin-High Sensitivity <3 0 - 54 ng/L   D DIMER    Collection Time: 09/19/22  5:30 PM   Result Value Ref Range    D DIMER 0.28 <0.46 ug/ml(FEU)   CBC WITH AUTOMATED DIFF    Collection Time: 09/19/22  5:30 PM   Result Value Ref Range    WBC 10.5 4.6 - 13.2 K/uL    RBC 3.60 (L) 4.20 - 5.30 M/uL    HGB 10.7 (L) 12.0 - 16.0 g/dL    HCT 33.9 (L) 35.0 - 45.0 %    MCV 94.2 78.0 - 100.0 FL    MCH 29.7 24.0 - 34.0 PG    MCHC 31.6 31.0 - 37.0 g/dL    RDW 13.4 11.6 - 14.5 %    PLATELET 017 309 - 194 K/uL    MPV 10.1 9.2 - 11.8 FL    NRBC 0.0 0  WBC    ABSOLUTE NRBC 0.00 0.00 - 0.01 K/uL    NEUTROPHILS 38 (L) 40 - 73 %    LYMPHOCYTES 55 (H) 21 - 52 %    MONOCYTES 4 3 - 10 %    EOSINOPHILS 3 0 - 5 %    BASOPHILS 0 0 - 2 %    IMMATURE GRANULOCYTES 0 %    ABS.  NEUTROPHILS 4.0 1.8 - 8.0 K/UL    ABS. LYMPHOCYTES 5.8 (H) 0.9 - 3.6 K/UL    ABS. MONOCYTES 0.4 0.05 - 1.2 K/UL    ABS. EOSINOPHILS 0.3 0.0 - 0.4 K/UL    ABS. BASOPHILS 0.0 0.0 - 0.1 K/UL    ABS. IMM. GRANS. 0.0 K/UL    DF MANUAL      RBC COMMENTS SPHEROCYTES  1+        RBC COMMENTS TOXIC GRANULATION  3+        WBC COMMENTS REACTIVE LYMPHS     POC LACTIC ACID    Collection Time: 09/19/22  6:15 PM   Result Value Ref Range    Lactic Acid (POC) 0.96 0.40 - 2.00 mmol/L   URINALYSIS W/ RFLX MICROSCOPIC    Collection Time: 09/19/22  7:19 PM   Result Value Ref Range    Color AMY      Appearance CLEAR      Specific gravity 1.026 1.005 - 1.030      pH (UA) 5.5 5.0 - 8.0      Protein TRACE (A) NEG mg/dL    Glucose Negative NEG mg/dL    Ketone Negative NEG mg/dL    Bilirubin SMALL (A) NEG      Blood Negative NEG      Urobilinogen 1.0 0.2 - 1.0 EU/dL    Nitrites Positive (A) NEG      Leukocyte Esterase MODERATE (A) NEG     URINE MICROSCOPIC ONLY    Collection Time: 09/19/22  7:19 PM   Result Value Ref Range    WBC 11 to 20 0 - 5 /hpf    RBC NONE 0 - 5 /hpf    Epithelial cells FEW 0 - 5 /lpf    Bacteria Negative NEG /hpf       Radiologic Studies -   XR CHEST PA LAT   Final Result   No acute cardiopulmonary findings. CT Results  (Last 48 hours)      None          CXR Results  (Last 48 hours)                 09/19/22 1749  XR CHEST PA LAT Final result    Impression:  No acute cardiopulmonary findings. Narrative:  EXAM: Chest radiographs       CLINICAL INDICATION/HISTORY: Dizziness     > Additional: None. COMPARISON: 07/12/2020       TECHNIQUE: PA and lateral views of the chest       _______________       FINDINGS:       HEART AND MEDIASTINUM: Cardiac silhouette is normal in size. Normal mediastinal   contours . Normal pulmonary vasculature. LUNGS AND PLEURAL SPACES: No focal airspace opacity. Sharp costophrenic sulci. No pneumothoraces.        BONY THORAX AND SOFT TISSUES: Unremarkable.       _______________ Medications given in the ED-  Medications   sodium chloride 0.9 % bolus infusion 1,000 mL (0 mL IntraVENous IV Completed 9/19/22 1812)   sodium chloride 0.9 % bolus infusion 1,000 mL (0 mL IntraVENous IV Completed 9/19/22 2008)       Please note that this dictation was completed with [employer], the computer voice recognition software. Quite often unanticipated grammatical, syntax, homophones, and other interpretive errors are inadvertently transcribed by the computer software. Please disregard these errors. Please excuse any errors that have escaped final proofreading.

## 2022-09-20 LAB
ATRIAL RATE: 86 BPM
ATRIAL RATE: 89 BPM
CALCULATED P AXIS, ECG09: 53 DEGREES
CALCULATED P AXIS, ECG09: 72 DEGREES
CALCULATED R AXIS, ECG10: 36 DEGREES
CALCULATED R AXIS, ECG10: 61 DEGREES
CALCULATED T AXIS, ECG11: 78 DEGREES
CALCULATED T AXIS, ECG11: 90 DEGREES
DIAGNOSIS, 93000: NORMAL
DIAGNOSIS, 93000: NORMAL
P-R INTERVAL, ECG05: 110 MS
P-R INTERVAL, ECG05: 126 MS
Q-T INTERVAL, ECG07: 366 MS
Q-T INTERVAL, ECG07: 380 MS
QRS DURATION, ECG06: 64 MS
QRS DURATION, ECG06: 68 MS
QTC CALCULATION (BEZET), ECG08: 445 MS
QTC CALCULATION (BEZET), ECG08: 454 MS
VENTRICULAR RATE, ECG03: 86 BPM
VENTRICULAR RATE, ECG03: 89 BPM

## 2022-09-20 NOTE — DISCHARGE INSTRUCTIONS
Well-hydrated  Healthy diet  Continue your home medications, caution as your pain medication can cause your oxygen level to drop and lower your blood pressure.   It is importantly follow-up with your doctor in the next 1 to 2 days for reevaluation  No driving, dangerous activity, bathing alone until you have been rechecked with your doctor and cleared  Return to ER for any new or worsening symptoms or new concerns

## 2022-11-18 ENCOUNTER — HOSPITAL ENCOUNTER (EMERGENCY)
Age: 47
Discharge: LEFT BEFORE DISCHARGE | End: 2022-11-19
Payer: MEDICAID

## 2022-11-18 VITALS
HEIGHT: 61 IN | BODY MASS INDEX: 25.49 KG/M2 | SYSTOLIC BLOOD PRESSURE: 131 MMHG | OXYGEN SATURATION: 97 % | WEIGHT: 135 LBS | HEART RATE: 93 BPM | RESPIRATION RATE: 7 BRPM | TEMPERATURE: 97.5 F | DIASTOLIC BLOOD PRESSURE: 87 MMHG

## 2022-11-18 PROCEDURE — 75810000275 HC EMERGENCY DEPT VISIT NO LEVEL OF CARE

## 2022-11-19 NOTE — ED NOTES
Pt left AMA, refused to wait to sign documentation. Pt completely awake and ambulatory, this RN informed pt of risks if leaving, this RN educated pt that she can come back anytime, we are open 24/7, pt verbally agreed and acknowledged this RN. Pt in no distress. This RN removed IV at this time. Pts son is here and taking pt home.

## 2022-11-19 NOTE — ED TRIAGE NOTES
Per medics flu like sx x 6 days, chills, weakness, N/v, cough, pt is vaccinated and boosted for covid. VSS. Normothermic. NSR in route. Pt takes dilaudid for interstitial cystitis. Nothing given in route, pain in central of chest with inhalation. Pt states she is nauseated, is having chest, belly (states her normal pain due to cystits.) and left flank pain. Denies any other complaints. Speaking in full sentences clearly but in a low voice. Respirations even and unlabored  Skin pink/warm/dry/intact  Patient behavior: Sitting comfortably on cot, no distress noted.

## 2022-11-21 LAB
ATRIAL RATE: 91 BPM
CALCULATED P AXIS, ECG09: 58 DEGREES
CALCULATED R AXIS, ECG10: 59 DEGREES
CALCULATED T AXIS, ECG11: 52 DEGREES
DIAGNOSIS, 93000: NORMAL
P-R INTERVAL, ECG05: 124 MS
Q-T INTERVAL, ECG07: 364 MS
QRS DURATION, ECG06: 64 MS
QTC CALCULATION (BEZET), ECG08: 447 MS
VENTRICULAR RATE, ECG03: 91 BPM

## 2024-04-30 ENCOUNTER — APPOINTMENT (OUTPATIENT)
Facility: HOSPITAL | Age: 49
End: 2024-04-30
Payer: MEDICAID

## 2024-04-30 ENCOUNTER — HOSPITAL ENCOUNTER (EMERGENCY)
Facility: HOSPITAL | Age: 49
Discharge: HOME | End: 2024-04-30
Attending: EMERGENCY MEDICINE
Payer: MEDICAID

## 2024-04-30 VITALS
WEIGHT: 121 LBS | HEIGHT: 61 IN | BODY MASS INDEX: 22.84 KG/M2 | SYSTOLIC BLOOD PRESSURE: 122 MMHG | OXYGEN SATURATION: 98 % | HEART RATE: 80 BPM | TEMPERATURE: 98.6 F | DIASTOLIC BLOOD PRESSURE: 52 MMHG | RESPIRATION RATE: 18 BRPM

## 2024-04-30 DIAGNOSIS — R07.9 CHEST PAIN, UNSPECIFIED TYPE: Primary | ICD-10-CM

## 2024-04-30 LAB
ALBUMIN SERPL-MCNC: 3.9 G/DL (ref 3.4–5)
ALBUMIN/GLOB SERPL: 1 (ref 0.8–1.7)
ALP SERPL-CCNC: 99 U/L (ref 45–117)
ALT SERPL-CCNC: 24 U/L (ref 13–56)
ANION GAP SERPL CALC-SCNC: 3 MMOL/L (ref 3–18)
AST SERPL-CCNC: 19 U/L (ref 10–38)
BASOPHILS # BLD: 0.1 K/UL (ref 0–0.1)
BASOPHILS NFR BLD: 1 % (ref 0–2)
BILIRUB SERPL-MCNC: 0.3 MG/DL (ref 0.2–1)
BUN SERPL-MCNC: 11 MG/DL (ref 7–18)
BUN/CREAT SERPL: 13 (ref 12–20)
CALCIUM SERPL-MCNC: 9 MG/DL (ref 8.5–10.1)
CHLORIDE SERPL-SCNC: 106 MMOL/L (ref 100–111)
CO2 SERPL-SCNC: 29 MMOL/L (ref 21–32)
CREAT SERPL-MCNC: 0.86 MG/DL (ref 0.6–1.3)
DIFFERENTIAL METHOD BLD: NORMAL
EKG ATRIAL RATE: 82 BPM
EKG ATRIAL RATE: 82 BPM
EKG DIAGNOSIS: NORMAL
EKG DIAGNOSIS: NORMAL
EKG P AXIS: 66 DEGREES
EKG P AXIS: 75 DEGREES
EKG P-R INTERVAL: 116 MS
EKG P-R INTERVAL: 116 MS
EKG Q-T INTERVAL: 374 MS
EKG Q-T INTERVAL: 384 MS
EKG QRS DURATION: 68 MS
EKG QRS DURATION: 72 MS
EKG QTC CALCULATION (BAZETT): 436 MS
EKG QTC CALCULATION (BAZETT): 448 MS
EKG R AXIS: 62 DEGREES
EKG R AXIS: 65 DEGREES
EKG T AXIS: 48 DEGREES
EKG T AXIS: 56 DEGREES
EKG VENTRICULAR RATE: 82 BPM
EKG VENTRICULAR RATE: 82 BPM
EOSINOPHIL # BLD: 0.2 K/UL (ref 0–0.4)
EOSINOPHIL NFR BLD: 2 % (ref 0–5)
ERYTHROCYTE [DISTWIDTH] IN BLOOD BY AUTOMATED COUNT: 13.2 % (ref 11.6–14.5)
GLOBULIN SER CALC-MCNC: 3.9 G/DL (ref 2–4)
GLUCOSE SERPL-MCNC: 113 MG/DL (ref 74–99)
HCT VFR BLD AUTO: 37.8 % (ref 35–45)
HGB BLD-MCNC: 12.1 G/DL (ref 12–16)
IMM GRANULOCYTES # BLD AUTO: 0 K/UL (ref 0–0.04)
IMM GRANULOCYTES NFR BLD AUTO: 0 % (ref 0–0.5)
LYMPHOCYTES # BLD: 2.6 K/UL (ref 0.9–3.6)
LYMPHOCYTES NFR BLD: 34 % (ref 21–52)
MAGNESIUM SERPL-MCNC: 2.3 MG/DL (ref 1.6–2.6)
MCH RBC QN AUTO: 28.6 PG (ref 24–34)
MCHC RBC AUTO-ENTMCNC: 32 G/DL (ref 31–37)
MCV RBC AUTO: 89.4 FL (ref 78–100)
MONOCYTES # BLD: 0.5 K/UL (ref 0.05–1.2)
MONOCYTES NFR BLD: 6 % (ref 3–10)
NEUTS SEG # BLD: 4.3 K/UL (ref 1.8–8)
NEUTS SEG NFR BLD: 56 % (ref 40–73)
NRBC # BLD: 0 K/UL (ref 0–0.01)
NRBC BLD-RTO: 0 PER 100 WBC
PLATELET # BLD AUTO: 311 K/UL (ref 135–420)
PMV BLD AUTO: 9.5 FL (ref 9.2–11.8)
POTASSIUM SERPL-SCNC: 3.8 MMOL/L (ref 3.5–5.5)
PROT SERPL-MCNC: 7.8 G/DL (ref 6.4–8.2)
RBC # BLD AUTO: 4.23 M/UL (ref 4.2–5.3)
SODIUM SERPL-SCNC: 138 MMOL/L (ref 136–145)
TROPONIN I SERPL HS-MCNC: 3 NG/L (ref 0–54)
TROPONIN I SERPL HS-MCNC: <3 NG/L (ref 0–54)
WBC # BLD AUTO: 7.6 K/UL (ref 4.6–13.2)

## 2024-04-30 PROCEDURE — 84484 ASSAY OF TROPONIN QUANT: CPT

## 2024-04-30 PROCEDURE — 6360000004 HC RX CONTRAST MEDICATION: Performed by: EMERGENCY MEDICINE

## 2024-04-30 PROCEDURE — 80053 COMPREHEN METABOLIC PANEL: CPT

## 2024-04-30 PROCEDURE — 85025 COMPLETE CBC W/AUTO DIFF WBC: CPT

## 2024-04-30 PROCEDURE — 71275 CT ANGIOGRAPHY CHEST: CPT

## 2024-04-30 PROCEDURE — 83735 ASSAY OF MAGNESIUM: CPT

## 2024-04-30 PROCEDURE — 99285 EMERGENCY DEPT VISIT HI MDM: CPT

## 2024-04-30 PROCEDURE — 6370000000 HC RX 637 (ALT 250 FOR IP): Performed by: EMERGENCY MEDICINE

## 2024-04-30 PROCEDURE — 71046 X-RAY EXAM CHEST 2 VIEWS: CPT

## 2024-04-30 RX ORDER — ACETAMINOPHEN 325 MG/1
650 TABLET ORAL
Status: COMPLETED | OUTPATIENT
Start: 2024-04-30 | End: 2024-04-30

## 2024-04-30 RX ADMIN — IOPAMIDOL 100 ML: 755 INJECTION, SOLUTION INTRAVENOUS at 19:04

## 2024-04-30 RX ADMIN — ACETAMINOPHEN 650 MG: 325 TABLET ORAL at 20:02

## 2024-04-30 NOTE — ED PROVIDER NOTES
return to the ED prior to their follow-up appointment, should her condition change. She has been provided with education for proper emergency department utilization.     CLINICAL IMPRESSION:  1. Chest pain, unspecified type        PLAN:  D/C Home    DISCHARGE MEDICATIONS:  Discharge Medication List as of 4/30/2024  8:49 PM             Details   alendronate (FOSAMAX) 70 MG tablet Take by mouthHistorical Med      amitriptyline (ELAVIL) 25 MG tablet Take by mouthHistorical Med      baclofen (LIORESAL) 10 MG tablet Take 1 or 2 tablets PO as needed up to 3 times daily.  Maximum 4 tablets daily.Historical Med      Buprenorphine HCl (BELBUCA) 150 MCG FILM Place inside cheek.Historical Med      diphenhydrAMINE (BENADRYL) 25 MG capsule Take 50 mg by mouth 2 times daily as neededHistorical Med      DULoxetine (CYMBALTA) 60 MG extended release capsule Take 60 mg by mouth 2 times dailyHistorical Med      HYDROmorphone (DILAUDID) 8 MG tablet Take 8 mg by mouth every 4 hours as needed.Historical Med      naloxone 4 MG/0.1ML LIQD nasal spray Use 1 spray intranasally, then discard. Repeat with new spray every 2 min as needed for opioid overdose symptoms, alternating nostrils.Historical Med      ondansetron (ZOFRAN) 4 MG tablet Take 4 mg by mouth every 8 hours as neededHistorical Med             DISCONTINUED MEDICATIONS:  Discharge Medication List as of 4/30/2024  8:49 PM          PATIENT REFERRED TO:  Follow Up with:  Mily Dean, [place]  [address] [facility] [facility] [address]  [place] [address]  [phone]    Schedule an appointment as soon as possible for a visit       [facility] EMERGENCY DEPT  [address]  [place] [address]  [phone]  Go to   If symptoms worsen      Dragon Disclaimer     Please note that this dictation was completed with [employer], the computer voice recognition software. Quite often unanticipated grammatical, syntax, homophones, and other interpretive errors are inadvertently transcribed by the

## 2024-05-10 LAB
EKG ATRIAL RATE: 82 BPM
EKG ATRIAL RATE: 82 BPM
EKG DIAGNOSIS: NORMAL
EKG DIAGNOSIS: NORMAL
EKG P AXIS: 66 DEGREES
EKG P AXIS: 75 DEGREES
EKG P-R INTERVAL: 116 MS
EKG P-R INTERVAL: 116 MS
EKG Q-T INTERVAL: 374 MS
EKG Q-T INTERVAL: 384 MS
EKG QRS DURATION: 68 MS
EKG QRS DURATION: 72 MS
EKG QTC CALCULATION (BAZETT): 436 MS
EKG QTC CALCULATION (BAZETT): 448 MS
EKG R AXIS: 62 DEGREES
EKG R AXIS: 65 DEGREES
EKG T AXIS: 48 DEGREES
EKG T AXIS: 56 DEGREES
EKG VENTRICULAR RATE: 82 BPM
EKG VENTRICULAR RATE: 82 BPM

## (undated) DEVICE — DRAPE TWL SURG 16X26IN BLU ORB04] ALLCARE INC]

## (undated) DEVICE — (D)BNDG ADHESIVE FABRIC 3/4X3 -- DISC BY MFR USE ITEM 357960

## (undated) DEVICE — MIRAGE SWIFT II PILLOW LGE: Brand: MIRAGE SWIFT II

## (undated) DEVICE — TRAY SUPP STD NO DRUG W EXTENSION SET

## (undated) DEVICE — NEEDLE SPNL 22GA L3.5IN BLK HUB S STL REG WALL FIT STYL W/

## (undated) DEVICE — SYR 10ML CTRL LR LCK NSAF LF --

## (undated) DEVICE — CUFF BLD PRESSURE MONITORING LNG AD 23-33 CM 1 TUBE MY CUF